# Patient Record
Sex: FEMALE | Race: WHITE | NOT HISPANIC OR LATINO | Employment: FULL TIME | URBAN - METROPOLITAN AREA
[De-identification: names, ages, dates, MRNs, and addresses within clinical notes are randomized per-mention and may not be internally consistent; named-entity substitution may affect disease eponyms.]

---

## 2018-06-13 ENCOUNTER — TRANSCRIBE ORDERS (OUTPATIENT)
Dept: URGENT CARE | Facility: MEDICAL CENTER | Age: 28
End: 2018-06-13

## 2018-06-13 ENCOUNTER — APPOINTMENT (OUTPATIENT)
Dept: LAB | Facility: MEDICAL CENTER | Age: 28
End: 2018-06-13

## 2018-06-13 ENCOUNTER — APPOINTMENT (OUTPATIENT)
Dept: URGENT CARE | Facility: MEDICAL CENTER | Age: 28
End: 2018-06-13

## 2018-06-13 DIAGNOSIS — Z02.1 PRE-EMPLOYMENT EXAMINATION: ICD-10-CM

## 2018-06-13 DIAGNOSIS — Z02.1 PRE-EMPLOYMENT EXAMINATION: Primary | ICD-10-CM

## 2018-06-13 PROCEDURE — 36415 COLL VENOUS BLD VENIPUNCTURE: CPT

## 2018-06-13 PROCEDURE — 86480 TB TEST CELL IMMUN MEASURE: CPT

## 2018-06-15 LAB
ANNOTATION COMMENT IMP: NORMAL
GAMMA INTERFERON BACKGROUND BLD IA-ACNC: 0.03 IU/ML
M TB IFN-G BLD-IMP: NEGATIVE
M TB IFN-G CD4+ BCKGRND COR BLD-ACNC: 0 IU/ML
M TB IFN-G CD4+ T-CELLS BLD-ACNC: 0.03 IU/ML
MITOGEN IGNF BLD-ACNC: 8.99 IU/ML
QUANTIFERON-TB GOLD IN TUBE: NORMAL
SERVICE CMNT-IMP: NORMAL

## 2018-08-06 ENCOUNTER — OFFICE VISIT (OUTPATIENT)
Dept: INTERNAL MEDICINE CLINIC | Facility: CLINIC | Age: 28
End: 2018-08-06
Payer: COMMERCIAL

## 2018-08-06 VITALS
HEART RATE: 64 BPM | OXYGEN SATURATION: 98 % | DIASTOLIC BLOOD PRESSURE: 72 MMHG | WEIGHT: 200.8 LBS | SYSTOLIC BLOOD PRESSURE: 118 MMHG | BODY MASS INDEX: 31.52 KG/M2 | HEIGHT: 67 IN | TEMPERATURE: 97.9 F

## 2018-08-06 DIAGNOSIS — I10 ESSENTIAL HYPERTENSION: ICD-10-CM

## 2018-08-06 DIAGNOSIS — B00.1 HERPES LABIALIS: Primary | ICD-10-CM

## 2018-08-06 DIAGNOSIS — J45.20 MILD INTERMITTENT ASTHMA WITHOUT COMPLICATION: ICD-10-CM

## 2018-08-06 DIAGNOSIS — F41.9 ANXIETY: ICD-10-CM

## 2018-08-06 DIAGNOSIS — G43.109 MIGRAINE WITH AURA AND WITHOUT STATUS MIGRAINOSUS, NOT INTRACTABLE: ICD-10-CM

## 2018-08-06 DIAGNOSIS — G47.09 OTHER INSOMNIA: ICD-10-CM

## 2018-08-06 PROCEDURE — 1036F TOBACCO NON-USER: CPT | Performed by: INTERNAL MEDICINE

## 2018-08-06 PROCEDURE — 3078F DIAST BP <80 MM HG: CPT | Performed by: INTERNAL MEDICINE

## 2018-08-06 PROCEDURE — 99203 OFFICE O/P NEW LOW 30 MIN: CPT | Performed by: INTERNAL MEDICINE

## 2018-08-06 PROCEDURE — 3074F SYST BP LT 130 MM HG: CPT | Performed by: INTERNAL MEDICINE

## 2018-08-06 RX ORDER — CLONAZEPAM 0.5 MG/1
0.5 TABLET ORAL
COMMUNITY
End: 2018-08-06 | Stop reason: SDUPTHER

## 2018-08-06 RX ORDER — ZOLPIDEM TARTRATE 5 MG/1
5 TABLET ORAL AS NEEDED
Refills: 1 | COMMUNITY
Start: 2018-06-09 | End: 2018-08-06 | Stop reason: SDUPTHER

## 2018-08-06 RX ORDER — CLONAZEPAM 0.5 MG/1
0.5 TABLET ORAL
Qty: 30 TABLET | Refills: 0 | Status: SHIPPED | OUTPATIENT
Start: 2018-08-06 | End: 2019-03-07 | Stop reason: SDUPTHER

## 2018-08-06 RX ORDER — ZOLPIDEM TARTRATE 5 MG/1
5 TABLET ORAL AS NEEDED
Qty: 30 TABLET | Refills: 0 | Status: SHIPPED | OUTPATIENT
Start: 2018-08-06 | End: 2019-03-25 | Stop reason: SDUPTHER

## 2018-08-06 RX ORDER — LISINOPRIL 10 MG/1
10 TABLET ORAL DAILY
Refills: 1 | COMMUNITY
Start: 2018-06-08 | End: 2018-08-06 | Stop reason: SDUPTHER

## 2018-08-06 RX ORDER — LISINOPRIL 10 MG/1
10 TABLET ORAL DAILY
Qty: 90 TABLET | Refills: 1 | Status: SHIPPED | OUTPATIENT
Start: 2018-08-06 | End: 2019-03-07 | Stop reason: SDUPTHER

## 2018-08-06 RX ORDER — SERTRALINE HYDROCHLORIDE 100 MG/1
100 TABLET, FILM COATED ORAL DAILY
Refills: 5 | COMMUNITY
Start: 2018-06-11 | End: 2018-08-06 | Stop reason: SDUPTHER

## 2018-08-06 RX ORDER — ACYCLOVIR 400 MG/1
400 TABLET ORAL 2 TIMES DAILY PRN
COMMUNITY
End: 2020-11-05 | Stop reason: ALTCHOICE

## 2018-08-06 RX ORDER — SERTRALINE HYDROCHLORIDE 100 MG/1
100 TABLET, FILM COATED ORAL DAILY
Qty: 90 TABLET | Refills: 1 | Status: SHIPPED | OUTPATIENT
Start: 2018-08-06 | End: 2019-03-07 | Stop reason: SDUPTHER

## 2018-08-06 NOTE — PROGRESS NOTES
Assessment/Plan:    Migraine with aura and without status migrainosus, not intractable  Well controlled  Continue with Excedrin migraine for acute breakthrough  Essential hypertension  Well controlled  Continue with lisinopril 10 mg daily  Anxiety    Stable  Continue with Zoloft 100 mg daily, Klonopin 0 5 mg daily at bedtime -as needed  Other insomnia   Continue with as needed 5 mg Ambien at bedtime  Diagnoses and all orders for this visit:    Herpes labialis    Migraine with aura and without status migrainosus, not intractable    Anxiety  -     sertraline (ZOLOFT) 100 mg tablet; Take 1 tablet (100 mg total) by mouth daily  -     clonazePAM (KlonoPIN) 0 5 mg tablet; Take 1 tablet (0 5 mg total) by mouth daily at bedtime  -     TSH, 3rd generation with T4 reflex; Future    Mild intermittent asthma without complication    Other insomnia  -     zolpidem (AMBIEN) 5 mg tablet; Take 1 tablet (5 mg total) by mouth as needed for sleep    Essential hypertension  -     lisinopril (ZESTRIL) 10 mg tablet; Take 1 tablet (10 mg total) by mouth daily  -     CBC; Future  -     Comprehensive metabolic panel; Future  -     Lipid panel; Future  -     TSH, 3rd generation with T4 reflex; Future    Other orders  -     Discontinue: zolpidem (AMBIEN) 5 mg tablet; Take 5 mg by mouth as needed  -     Discontinue: sertraline (ZOLOFT) 100 mg tablet; Take 100 mg by mouth daily  -     Discontinue: lisinopril (ZESTRIL) 10 mg tablet; Take 10 mg by mouth daily  -     acyclovir (ZOVIRAX) 400 MG tablet; Take 400 mg by mouth 2 (two) times a day  -     Discontinue: clonazePAM (KlonoPIN) 0 5 mg tablet; Take 0 5 mg by mouth daily at bedtime          Subjective:      Patient ID: Albina Holm is a 24 y o  female  51-year-old female is seen today to establish care  She has a past medical history of anxiety / depression, asthma, depression, HTN, and migraine headaches    She recently moved from [place] to which she was compliant with seeing her primary care physician as well as a neurologist for management of migraine headaches and was evaluated once by Psychiatry to confirm appropriate dosing of current medications for management of anxiety depression  She does not take anything prophylactically for migraine headaches, she gets migraine headaches once every 6 months  She has lost 38 lbs over the past year, intentionally, with diet and exercise  Hypertension   This is a chronic problem  The current episode started more than 1 year ago  The problem is unchanged  The problem is controlled  Pertinent negatives include no chest pain, headaches, palpitations or shortness of breath  Risk factors for coronary artery disease include obesity  Past treatments include ACE inhibitors  There are no compliance problems  Migraine    This is a chronic problem  The current episode started more than 1 year ago  The problem occurs seasonly  The problem has been unchanged  The pain quality is similar to prior headaches  The patient is experiencing no pain  Pertinent negatives include no abdominal pain, coughing, dizziness, fever, nausea, numbness, rhinorrhea, sinus pressure, sore throat, vomiting or weakness  She has tried antidepressants for the symptoms  The treatment provided moderate relief  The following portions of the patient's history were reviewed and updated as appropriate: allergies, current medications, past family history, past medical history, past social history, past surgical history and problem list     Review of Systems   Constitutional: Negative for activity change, appetite change, chills, diaphoresis, fatigue and fever  HENT: Negative for congestion, postnasal drip, rhinorrhea, sinus pain, sinus pressure, sneezing and sore throat  Eyes: Negative for visual disturbance  Respiratory: Negative for apnea, cough, choking, chest tightness, shortness of breath and wheezing      Cardiovascular: Negative for chest pain, palpitations and leg swelling  Gastrointestinal: Negative for abdominal distention, abdominal pain, anal bleeding, blood in stool, constipation, diarrhea, nausea and vomiting  Endocrine: Negative for cold intolerance and heat intolerance  Genitourinary: Negative for difficulty urinating, dysuria and hematuria  Musculoskeletal: Negative  Skin: Negative  Neurological: Negative for dizziness, weakness, light-headedness, numbness and headaches  Hematological: Negative for adenopathy  Psychiatric/Behavioral: Negative for agitation, sleep disturbance and suicidal ideas  All other systems reviewed and are negative  Past Medical History:   Diagnosis Date    Anxiety     Asthma     Depression     FHx: migraine headaches     High blood pressure          Current Outpatient Prescriptions:     acyclovir (ZOVIRAX) 400 MG tablet, Take 400 mg by mouth 2 (two) times a day, Disp: , Rfl:     clonazePAM (KlonoPIN) 0 5 mg tablet, Take 1 tablet (0 5 mg total) by mouth daily at bedtime, Disp: 30 tablet, Rfl: 0    lisinopril (ZESTRIL) 10 mg tablet, Take 1 tablet (10 mg total) by mouth daily, Disp: 90 tablet, Rfl: 1    sertraline (ZOLOFT) 100 mg tablet, Take 1 tablet (100 mg total) by mouth daily, Disp: 90 tablet, Rfl: 1    zolpidem (AMBIEN) 5 mg tablet, Take 1 tablet (5 mg total) by mouth as needed for sleep, Disp: 30 tablet, Rfl: 0    Allergies   Allergen Reactions    Clarithromycin Hives    Tetracyclines & Related Hives       Social History   History reviewed  No pertinent surgical history    Family History   Problem Relation Age of Onset    Hypertension Mother     Atrial fibrillation Mother     Stroke Mother     Depression Mother     Hypertension Father     Stroke Maternal Grandmother     Hypertension Maternal Grandmother     Atrial fibrillation Maternal Grandmother     Stroke Maternal Grandfather     Hypertension Maternal Grandfather     Hypertension Paternal Grandfather Objective:  /72 (BP Location: Left arm, Patient Position: Sitting, Cuff Size: Adult)   Pulse 64   Temp 97 9 °F (36 6 °C) (Oral)   Ht 5' 6 73" (1 695 m)   Wt 91 1 kg (200 lb 12 8 oz)   SpO2 98%   BMI 31 70 kg/m²     Recent Results (from the past 1344 hour(s))   Quantiferon TB Gold    Collection Time: 06/13/18  8:27 AM   Result Value Ref Range    QuantiFERON-TB Gold In Tube Specimen incubated at [place], [place]  QuantiFERON TB In Tube-Reflex    Collection Time: 06/13/18  8:27 AM   Result Value Ref Range    QuantiFERON TB Gold Negative Negative    QuantiFERON Criteria Comment     QuantiFERON TB Ag Value 0 03 IU/mL    QuantiFERON Nil Value 0 03 IU/mL    QuantiFERON Mitogen value 8 99 IU/mL    QFT TB Ag minus Nil Value 0 00 IU/mL    QFT Interpretation Comment             Physical Exam   Constitutional: She is oriented to person, place, and time  She appears well-developed and well-nourished  No distress  HENT:   Head: Normocephalic and atraumatic  Eyes: Conjunctivae and EOM are normal  Pupils are equal, round, and reactive to light  Right eye exhibits no discharge  Left eye exhibits no discharge  Neck: Normal range of motion  Neck supple  No JVD present  No thyromegaly present  Cardiovascular: Normal rate, regular rhythm, normal heart sounds and intact distal pulses  Exam reveals no gallop and no friction rub  No murmur heard  Pulmonary/Chest: Effort normal and breath sounds normal  No respiratory distress  She has no wheezes  She has no rales  She exhibits no tenderness  Abdominal: Soft  She exhibits no distension  There is no tenderness  Musculoskeletal: Normal range of motion  She exhibits no edema, tenderness or deformity  Lymphadenopathy:     She has no cervical adenopathy  Neurological: She is alert and oriented to person, place, and time  No cranial nerve deficit  Coordination normal    Skin: Skin is warm and dry  No rash noted  She is not diaphoretic  No erythema   No pallor  Psychiatric: She has a normal mood and affect  Her behavior is normal  Judgment and thought content normal    Nursing note and vitals reviewed

## 2018-08-14 ENCOUNTER — APPOINTMENT (OUTPATIENT)
Dept: LAB | Facility: HOSPITAL | Age: 28
End: 2018-08-14
Attending: INTERNAL MEDICINE
Payer: COMMERCIAL

## 2018-08-14 ENCOUNTER — TRANSCRIBE ORDERS (OUTPATIENT)
Dept: LAB | Facility: HOSPITAL | Age: 28
End: 2018-08-14

## 2018-08-14 DIAGNOSIS — I10 ESSENTIAL HYPERTENSION: ICD-10-CM

## 2018-08-14 DIAGNOSIS — F41.9 ANXIETY: ICD-10-CM

## 2018-08-14 LAB
ALBUMIN SERPL BCP-MCNC: 3.8 G/DL (ref 3.5–5)
ALP SERPL-CCNC: 75 U/L (ref 46–116)
ALT SERPL W P-5'-P-CCNC: 17 U/L (ref 12–78)
ANION GAP SERPL CALCULATED.3IONS-SCNC: 7 MMOL/L (ref 4–13)
AST SERPL W P-5'-P-CCNC: 10 U/L (ref 5–45)
BILIRUB SERPL-MCNC: 0.31 MG/DL (ref 0.2–1)
BUN SERPL-MCNC: 18 MG/DL (ref 5–25)
CALCIUM SERPL-MCNC: 8.7 MG/DL (ref 8.3–10.1)
CHLORIDE SERPL-SCNC: 105 MMOL/L (ref 100–108)
CHOLEST SERPL-MCNC: 182 MG/DL (ref 50–200)
CO2 SERPL-SCNC: 27 MMOL/L (ref 21–32)
CREAT SERPL-MCNC: 0.84 MG/DL (ref 0.6–1.3)
ERYTHROCYTE [DISTWIDTH] IN BLOOD BY AUTOMATED COUNT: 11.9 % (ref 11.6–15.1)
GFR SERPL CREATININE-BSD FRML MDRD: 97 ML/MIN/1.73SQ M
GLUCOSE P FAST SERPL-MCNC: 84 MG/DL (ref 65–99)
HCT VFR BLD AUTO: 38.9 % (ref 34.8–46.1)
HDLC SERPL-MCNC: 47 MG/DL (ref 40–60)
HGB BLD-MCNC: 13 G/DL (ref 11.5–15.4)
LDLC SERPL CALC-MCNC: 124 MG/DL (ref 0–100)
MCH RBC QN AUTO: 30.7 PG (ref 26.8–34.3)
MCHC RBC AUTO-ENTMCNC: 33.4 G/DL (ref 31.4–37.4)
MCV RBC AUTO: 92 FL (ref 82–98)
NONHDLC SERPL-MCNC: 135 MG/DL
PLATELET # BLD AUTO: 232 THOUSANDS/UL (ref 149–390)
PMV BLD AUTO: 10.4 FL (ref 8.9–12.7)
POTASSIUM SERPL-SCNC: 4 MMOL/L (ref 3.5–5.3)
PROT SERPL-MCNC: 7.2 G/DL (ref 6.4–8.2)
RBC # BLD AUTO: 4.23 MILLION/UL (ref 3.81–5.12)
SODIUM SERPL-SCNC: 139 MMOL/L (ref 136–145)
TRIGL SERPL-MCNC: 54 MG/DL
TSH SERPL DL<=0.05 MIU/L-ACNC: 2.39 UIU/ML (ref 0.36–3.74)
WBC # BLD AUTO: 5.84 THOUSAND/UL (ref 4.31–10.16)

## 2018-08-14 PROCEDURE — 84443 ASSAY THYROID STIM HORMONE: CPT

## 2018-08-14 PROCEDURE — 80053 COMPREHEN METABOLIC PANEL: CPT

## 2018-08-14 PROCEDURE — 36415 COLL VENOUS BLD VENIPUNCTURE: CPT

## 2018-08-14 PROCEDURE — 80061 LIPID PANEL: CPT

## 2018-08-14 PROCEDURE — 85027 COMPLETE CBC AUTOMATED: CPT

## 2018-12-14 DIAGNOSIS — B37.9 YEAST INFECTION: Primary | ICD-10-CM

## 2018-12-14 RX ORDER — FLUCONAZOLE 150 MG/1
150 TABLET ORAL ONCE
Qty: 1 TABLET | Refills: 0 | Status: SHIPPED | OUTPATIENT
Start: 2018-12-14 | End: 2018-12-14

## 2019-02-27 ENCOUNTER — OFFICE VISIT (OUTPATIENT)
Dept: INTERNAL MEDICINE CLINIC | Age: 29
End: 2019-02-27
Payer: COMMERCIAL

## 2019-02-27 VITALS
DIASTOLIC BLOOD PRESSURE: 68 MMHG | WEIGHT: 206.2 LBS | TEMPERATURE: 98.8 F | HEIGHT: 67 IN | HEART RATE: 75 BPM | SYSTOLIC BLOOD PRESSURE: 110 MMHG | OXYGEN SATURATION: 98 % | BODY MASS INDEX: 32.36 KG/M2

## 2019-02-27 DIAGNOSIS — B37.9 YEAST INFECTION: ICD-10-CM

## 2019-02-27 DIAGNOSIS — J40 BRONCHITIS: Primary | ICD-10-CM

## 2019-02-27 PROCEDURE — 99213 OFFICE O/P EST LOW 20 MIN: CPT | Performed by: NURSE PRACTITIONER

## 2019-02-27 PROCEDURE — 3008F BODY MASS INDEX DOCD: CPT | Performed by: NURSE PRACTITIONER

## 2019-02-27 PROCEDURE — 1036F TOBACCO NON-USER: CPT | Performed by: NURSE PRACTITIONER

## 2019-02-27 RX ORDER — AZITHROMYCIN 250 MG/1
TABLET, FILM COATED ORAL
Qty: 6 TABLET | Refills: 0 | Status: SHIPPED | OUTPATIENT
Start: 2019-02-27 | End: 2019-03-03

## 2019-02-27 RX ORDER — PROMETHAZINE HYDROCHLORIDE AND CODEINE PHOSPHATE 6.25; 1 MG/5ML; MG/5ML
5 SYRUP ORAL EVERY 6 HOURS PRN
Qty: 120 ML | Refills: 0 | Status: SHIPPED | OUTPATIENT
Start: 2019-02-27 | End: 2019-02-27 | Stop reason: SDUPTHER

## 2019-02-27 RX ORDER — FLUCONAZOLE 150 MG/1
150 TABLET ORAL ONCE
Qty: 2 TABLET | Refills: 0 | Status: SHIPPED | OUTPATIENT
Start: 2019-02-27 | End: 2019-02-27

## 2019-02-27 RX ORDER — PROMETHAZINE HYDROCHLORIDE AND CODEINE PHOSPHATE 6.25; 1 MG/5ML; MG/5ML
5 SYRUP ORAL EVERY 6 HOURS PRN
Qty: 120 ML | Refills: 0 | Status: SHIPPED | OUTPATIENT
Start: 2019-02-27 | End: 2019-03-25 | Stop reason: ALTCHOICE

## 2019-02-27 RX ORDER — AZITHROMYCIN 250 MG/1
TABLET, FILM COATED ORAL
Qty: 6 TABLET | Refills: 0 | Status: SHIPPED | OUTPATIENT
Start: 2019-02-27 | End: 2019-02-27 | Stop reason: SDUPTHER

## 2019-02-27 NOTE — PROGRESS NOTES
Assessment/Plan:    Bronchitis  - pt works in hospital setting  - start z-pack - tolerated well in past  - phenergan with codeine prn  - rest  - stay well hydrated     Diagnoses and all orders for this visit:    Bronchitis  -     Discontinue: azithromycin (ZITHROMAX) 250 mg tablet; Take 2 tablets today then 1 tablet daily x 4 days  -     Discontinue: promethazine-codeine (PHENERGAN WITH CODEINE) 6 25-10 mg/5 mL syrup; Take 5 mL by mouth every 6 (six) hours as needed for cough  -     azithromycin (ZITHROMAX) 250 mg tablet; Take 2 tablets today then 1 tablet daily x 4 days  -     promethazine-codeine (PHENERGAN WITH CODEINE) 6 25-10 mg/5 mL syrup; Take 5 mL by mouth every 6 (six) hours as needed for cough    Yeast infection  -     fluconazole (DIFLUCAN) 150 mg tablet; Take 1 tablet (150 mg total) by mouth once for 1 dose May repeat in 3 days if needed          Subjective:      Patient ID: Pita Urban is a 34 y o  female  URI    This is a new problem  The current episode started today  The problem has been gradually worsening  The maximum temperature recorded prior to her arrival was 101 - 101 9 F  Associated symptoms include congestion, coughing (yellow thick mucous), headaches, a plugged ear sensation, rhinorrhea, sinus pain and a sore throat  Pertinent negatives include no chest pain, ear pain, nausea, rash, vomiting or wheezing  Treatments tried: tyl;enol cold and flu  The treatment provided mild relief  The following portions of the patient's history were reviewed and updated as appropriate: allergies, current medications, past family history, past medical history, past social history, past surgical history and problem list     Review of Systems   Constitutional: Positive for appetite change (decreased), chills and fatigue  Negative for fever  HENT: Positive for congestion, postnasal drip, rhinorrhea, sinus pressure, sinus pain and sore throat  Negative for ear pain      Respiratory: Positive for cough (yellow thick mucous)  Negative for shortness of breath and wheezing  Cardiovascular: Negative for chest pain and palpitations  Gastrointestinal: Negative for nausea and vomiting  Musculoskeletal: Negative for arthralgias and myalgias  Skin: Negative for rash  Neurological: Positive for light-headedness and headaches  Negative for dizziness  Past Medical History:   Diagnosis Date    Anxiety     Asthma     Depression     FHx: migraine headaches     High blood pressure          Current Outpatient Medications:     acyclovir (ZOVIRAX) 400 MG tablet, Take 400 mg by mouth 2 (two) times a day, Disp: , Rfl:     clonazePAM (KlonoPIN) 0 5 mg tablet, Take 1 tablet (0 5 mg total) by mouth daily at bedtime, Disp: 30 tablet, Rfl: 0    lisinopril (ZESTRIL) 10 mg tablet, Take 1 tablet (10 mg total) by mouth daily, Disp: 90 tablet, Rfl: 1    sertraline (ZOLOFT) 100 mg tablet, Take 1 tablet (100 mg total) by mouth daily, Disp: 90 tablet, Rfl: 1    zolpidem (AMBIEN) 5 mg tablet, Take 1 tablet (5 mg total) by mouth as needed for sleep, Disp: 30 tablet, Rfl: 0    azithromycin (ZITHROMAX) 250 mg tablet, Take 2 tablets today then 1 tablet daily x 4 days, Disp: 6 tablet, Rfl: 0    fluconazole (DIFLUCAN) 150 mg tablet, Take 1 tablet (150 mg total) by mouth once for 1 dose May repeat in 3 days if needed, Disp: 2 tablet, Rfl: 0    promethazine-codeine (PHENERGAN WITH CODEINE) 6 25-10 mg/5 mL syrup, Take 5 mL by mouth every 6 (six) hours as needed for cough, Disp: 120 mL, Rfl: 0    Allergies   Allergen Reactions    Clarithromycin Hives    Tetracyclines & Related Hives       Social History   History reviewed  No pertinent surgical history    Family History   Problem Relation Age of Onset    Hypertension Mother     Atrial fibrillation Mother     Stroke Mother     Depression Mother     Hypertension Father     Stroke Maternal Grandmother     Hypertension Maternal Grandmother     Atrial fibrillation Maternal Grandmother     Stroke Maternal Grandfather     Hypertension Maternal Grandfather     Hypertension Paternal Grandfather        Objective:  /68 (BP Location: Left arm, Patient Position: Sitting, Cuff Size: Adult)   Pulse 75   Temp 98 8 °F (37 1 °C) (Tympanic)   Ht 5' 7" (1 702 m)   Wt 93 5 kg (206 lb 3 2 oz)   SpO2 98%   BMI 32 30 kg/m²      Physical Exam   Constitutional: She is oriented to person, place, and time  She appears well-developed and well-nourished  No distress  HENT:   Head: Normocephalic and atraumatic  Right Ear: Hearing, tympanic membrane, external ear and ear canal normal    Left Ear: Hearing, tympanic membrane, external ear and ear canal normal    Nose: Mucosal edema and rhinorrhea present  Right sinus exhibits no maxillary sinus tenderness and no frontal sinus tenderness  Left sinus exhibits no maxillary sinus tenderness and no frontal sinus tenderness  Mouth/Throat: Uvula is midline, oropharynx is clear and moist and mucous membranes are normal    Cardiovascular: Normal rate, regular rhythm and normal heart sounds  No murmur heard  Pulmonary/Chest: Effort normal and breath sounds normal  No respiratory distress  She has no wheezes  Lymphadenopathy:     She has no cervical adenopathy  Neurological: She is alert and oriented to person, place, and time  Skin: Skin is warm and dry  Psychiatric: She has a normal mood and affect  Her behavior is normal  Judgment and thought content normal    Nursing note and vitals reviewed

## 2019-02-27 NOTE — PATIENT INSTRUCTIONS
You have been prescribed an antibiotic today  Take the full course of prescription  Recommend taking with food as may upset your stomach  Also would recommend OTC probiotic or yogurt to help protect the natural fahad of your stomach  Rest   Stay well hydrated  Return for new/worsening symptoms

## 2019-03-06 ENCOUNTER — TELEPHONE (OUTPATIENT)
Dept: INTERNAL MEDICINE CLINIC | Facility: CLINIC | Age: 29
End: 2019-03-06

## 2019-03-06 DIAGNOSIS — I10 ESSENTIAL HYPERTENSION: ICD-10-CM

## 2019-03-06 DIAGNOSIS — F41.9 ANXIETY: ICD-10-CM

## 2019-03-07 RX ORDER — LISINOPRIL 10 MG/1
10 TABLET ORAL DAILY
Qty: 30 TABLET | Refills: 0 | Status: SHIPPED | OUTPATIENT
Start: 2019-03-07 | End: 2019-03-25 | Stop reason: SDUPTHER

## 2019-03-07 RX ORDER — CLONAZEPAM 0.5 MG/1
0.5 TABLET ORAL
Qty: 10 TABLET | Refills: 0 | Status: SHIPPED | OUTPATIENT
Start: 2019-03-07 | End: 2019-03-25 | Stop reason: SDUPTHER

## 2019-03-07 RX ORDER — SERTRALINE HYDROCHLORIDE 100 MG/1
100 TABLET, FILM COATED ORAL DAILY
Qty: 30 TABLET | Refills: 0 | Status: SHIPPED | OUTPATIENT
Start: 2019-03-07 | End: 2019-03-25 | Stop reason: SDUPTHER

## 2019-03-25 ENCOUNTER — OFFICE VISIT (OUTPATIENT)
Dept: INTERNAL MEDICINE CLINIC | Facility: CLINIC | Age: 29
End: 2019-03-25
Payer: COMMERCIAL

## 2019-03-25 VITALS
BODY MASS INDEX: 33.21 KG/M2 | HEIGHT: 67 IN | TEMPERATURE: 97.8 F | OXYGEN SATURATION: 99 % | DIASTOLIC BLOOD PRESSURE: 76 MMHG | WEIGHT: 211.6 LBS | SYSTOLIC BLOOD PRESSURE: 112 MMHG | HEART RATE: 79 BPM

## 2019-03-25 DIAGNOSIS — F41.9 ANXIETY: ICD-10-CM

## 2019-03-25 DIAGNOSIS — I10 ESSENTIAL HYPERTENSION: Primary | ICD-10-CM

## 2019-03-25 DIAGNOSIS — F41.8 TEST ANXIETY: ICD-10-CM

## 2019-03-25 DIAGNOSIS — G47.09 OTHER INSOMNIA: ICD-10-CM

## 2019-03-25 PROCEDURE — 3074F SYST BP LT 130 MM HG: CPT | Performed by: NURSE PRACTITIONER

## 2019-03-25 PROCEDURE — 3078F DIAST BP <80 MM HG: CPT | Performed by: NURSE PRACTITIONER

## 2019-03-25 PROCEDURE — 3008F BODY MASS INDEX DOCD: CPT | Performed by: NURSE PRACTITIONER

## 2019-03-25 PROCEDURE — 99214 OFFICE O/P EST MOD 30 MIN: CPT | Performed by: NURSE PRACTITIONER

## 2019-03-25 PROCEDURE — 1036F TOBACCO NON-USER: CPT | Performed by: NURSE PRACTITIONER

## 2019-03-25 RX ORDER — ZOLPIDEM TARTRATE 5 MG/1
5 TABLET ORAL AS NEEDED
Qty: 30 TABLET | Refills: 0 | Status: SHIPPED | OUTPATIENT
Start: 2019-03-25 | End: 2019-07-01 | Stop reason: SDUPTHER

## 2019-03-25 RX ORDER — LISINOPRIL 10 MG/1
10 TABLET ORAL DAILY
Qty: 90 TABLET | Refills: 1 | Status: SHIPPED | OUTPATIENT
Start: 2019-03-25 | End: 2019-07-01 | Stop reason: SDUPTHER

## 2019-03-25 RX ORDER — CLONAZEPAM 0.5 MG/1
0.5 TABLET ORAL
Qty: 30 TABLET | Refills: 0 | Status: SHIPPED | OUTPATIENT
Start: 2019-03-25 | End: 2019-03-26

## 2019-03-25 NOTE — PROGRESS NOTES
Assessment/Plan:    Essential hypertension  Stable on current dose of lisinopril 10mg daily  Labs last checked in August 2018, stable  Will continue to monitor  Other insomnia  Patient works hour-varying shifts as an OB/GYN resident  She only takes Ambien 5mg as needed on her off-shifts/time, no more than 3 times per week  Her last fill was in August per the PDMP  It was queried and it was appropriate to fill this medication for the patient at this time to use PRN  Anxiety  Will increase patient's sertraline 100mg to 150mg and monitor effect  Patient takes Klonopin 0 5mg PRN for severe anxiety, uses judiciously  PDMP queried and this is appropriate to fill for patient  Patient advised that she must follow up every 3  Months for controlled substances to continue to be filled or sooner if needed  Test anxiety  Discussed with patient that increasing her sertaline will likely help with test taking anxiety  Diagnoses and all orders for this visit:    Essential hypertension  -     lisinopril (ZESTRIL) 10 mg tablet; Take 1 tablet (10 mg total) by mouth daily    Anxiety  -     sertraline (ZOLOFT) 50 mg tablet; Take 3 tablets (150 mg total) by mouth daily  -     Discontinue: clonazePAM (KlonoPIN) 0 5 mg tablet; Take 1 tablet (0 5 mg total) by mouth daily at bedtime  -     clonazePAM (KlonoPIN) 0 5 mg tablet; Take 1 tablet (0 5 mg total) by mouth daily as needed for anxiety    Other insomnia  -     zolpidem (AMBIEN) 5 mg tablet; Take 1 tablet (5 mg total) by mouth as needed for sleep    Test anxiety          Subjective:      Patient ID: Shane Gore is a 34 y o  female  Patient  presents today for follow up on chronic conditions  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 with a lot of stress, however, she states she is doing relatively good considering the situation  She reports she is at baseline with her anxiety and insomnia  Test anxiety is her biggest concern at the moment   She has seen psychology in the past for this, but they had no real suggestions and she was wondering if there were any additional suggestions we may have  She is requesting an increase in her sertraline  Anxiety   Presents for follow-up visit  Symptoms include excessive worry (stable), insomnia (She reports taking ambien 3 times a week, often due to abnormal sleep patterns related to her work schedule) and nervous/anxious behavior (stable, except with test taking, which she states influences her test scores  She has a 14 hour test coming up that she is worried she may fail again due to her anxiety and not her knowledge  )  Patient reports no chest pain, decreased concentration, depressed mood, dizziness, irritability, muscle tension, nausea, palpitations, shortness of breath or suicidal ideas  Symptoms occur most days  The severity of symptoms is mild  The quality of sleep is good  Nighttime awakenings: none  Compliance with medications is % (She currently takes 100mg Zoloft daily  She reports taking klonopin once a week, last panic attack was 4 days ago)  Hypertension   This is a chronic problem  Episode onset: age 14  The problem is unchanged  The problem is controlled  Associated symptoms include anxiety  Pertinent negatives include no blurred vision, chest pain, headaches, palpitations or shortness of breath  There are no associated agents to hypertension  Risk factors for coronary artery disease include obesity, stress and sedentary lifestyle  Past treatments include ACE inhibitors (Linsinopril 10mg; Pt has an IUD)  The current treatment provides significant improvement  Compliance problems include diet and exercise (She is under significant stress and works  hours a week  She plans on focusing more on diet and exercise once she is done school  )          The following portions of the patient's history were reviewed and updated as appropriate: allergies, current medications, past family history, past medical history, past social history, past surgical history and problem list     Review of Systems   Constitutional: Negative for chills, diaphoresis, fever and irritability  HENT: Negative for trouble swallowing  Eyes: Negative for blurred vision  Respiratory: Negative for shortness of breath  Cardiovascular: Negative for chest pain and palpitations  Gastrointestinal: Negative for abdominal pain, nausea and vomiting  Musculoskeletal: Negative for gait problem  Skin: Negative for rash  Neurological: Negative for dizziness and headaches  Psychiatric/Behavioral: Negative for decreased concentration and suicidal ideas  The patient is nervous/anxious (stable, except with test taking, which she states influences her test scores  She has a 14 hour test coming up that she is worried she may fail again due to her anxiety and not her knowledge  ) and has insomnia (She reports taking ambien 3 times a week, often due to abnormal sleep patterns related to her work schedule)  Objective:      /76 (BP Location: Left arm, Patient Position: Sitting, Cuff Size: Large)   Pulse 79   Temp 97 8 °F (36 6 °C) (Oral)   Ht 5' 7" (1 702 m)   Wt 96 kg (211 lb 9 6 oz)   SpO2 99%   BMI 33 14 kg/m²          Physical Exam   Constitutional: She is oriented to person, place, and time  She appears well-developed and well-nourished  No distress  HENT:   Head: Normocephalic and atraumatic  Mouth/Throat: No oropharyngeal exudate  Eyes: Pupils are equal, round, and reactive to light  No scleral icterus  Neck: Normal range of motion  Neck supple  Cardiovascular: Normal rate and regular rhythm  Pulmonary/Chest: Effort normal and breath sounds normal  No respiratory distress  Abdominal: Soft  Musculoskeletal: Normal range of motion  She exhibits no edema  Lymphadenopathy:     She has no cervical adenopathy  Neurological: She is alert and oriented to person, place, and time     Skin: Skin is warm and dry  Psychiatric: She has a normal mood and affect   Her behavior is normal  Judgment and thought content normal

## 2019-03-26 PROBLEM — J40 BRONCHITIS: Status: RESOLVED | Noted: 2019-02-27 | Resolved: 2019-03-26

## 2019-03-26 PROBLEM — F41.8 TEST ANXIETY: Status: ACTIVE | Noted: 2019-03-26

## 2019-03-26 RX ORDER — CLONAZEPAM 0.5 MG/1
0.5 TABLET ORAL DAILY PRN
Qty: 30 TABLET | Refills: 0
Start: 2019-03-26 | End: 2019-07-01 | Stop reason: SDUPTHER

## 2019-03-26 NOTE — ASSESSMENT & PLAN NOTE
Stable on current dose of lisinopril 10mg daily  Labs last checked in August 2018, stable  Will continue to monitor

## 2019-03-26 NOTE — ASSESSMENT & PLAN NOTE
Will increase patient's sertraline 100mg to 150mg and monitor effect  Patient takes Klonopin 0 5mg PRN for severe anxiety, uses judiciously  PDMP queried and this is appropriate to fill for patient  Patient advised that she must follow up every 3  Months for controlled substances to continue to be filled or sooner if needed

## 2019-03-26 NOTE — ASSESSMENT & PLAN NOTE
Patient works hour-varying shifts as an OB/GYN resident  She only takes Ambien 5mg as needed on her off-shifts/time, no more than 3 times per week  Her last fill was in August per the PDMP  It was queried and it was appropriate to fill this medication for the patient at this time to use PRN

## 2019-07-01 ENCOUNTER — OFFICE VISIT (OUTPATIENT)
Dept: INTERNAL MEDICINE CLINIC | Facility: CLINIC | Age: 29
End: 2019-07-01
Payer: COMMERCIAL

## 2019-07-01 VITALS
TEMPERATURE: 98.3 F | DIASTOLIC BLOOD PRESSURE: 80 MMHG | WEIGHT: 214 LBS | HEART RATE: 78 BPM | HEIGHT: 67 IN | OXYGEN SATURATION: 98 % | BODY MASS INDEX: 33.59 KG/M2 | SYSTOLIC BLOOD PRESSURE: 118 MMHG

## 2019-07-01 DIAGNOSIS — F41.9 ANXIETY: Primary | ICD-10-CM

## 2019-07-01 DIAGNOSIS — Z02.6 ENCOUNTER FOR EXAMINATION FOR INSURANCE PURPOSES: ICD-10-CM

## 2019-07-01 DIAGNOSIS — I10 ESSENTIAL HYPERTENSION: ICD-10-CM

## 2019-07-01 DIAGNOSIS — G47.09 OTHER INSOMNIA: ICD-10-CM

## 2019-07-01 PROCEDURE — 99214 OFFICE O/P EST MOD 30 MIN: CPT | Performed by: NURSE PRACTITIONER

## 2019-07-01 PROCEDURE — 3008F BODY MASS INDEX DOCD: CPT | Performed by: NURSE PRACTITIONER

## 2019-07-01 RX ORDER — SERTRALINE HYDROCHLORIDE 100 MG/1
200 TABLET, FILM COATED ORAL DAILY
Qty: 180 TABLET | Refills: 1 | Status: SHIPPED | OUTPATIENT
Start: 2019-07-01 | End: 2020-01-27 | Stop reason: SDUPTHER

## 2019-07-01 RX ORDER — CLONAZEPAM 0.5 MG/1
0.5 TABLET ORAL DAILY PRN
Qty: 30 TABLET | Refills: 0 | Status: SHIPPED | OUTPATIENT
Start: 2019-07-01 | End: 2019-09-17 | Stop reason: SDUPTHER

## 2019-07-01 RX ORDER — LISINOPRIL 10 MG/1
10 TABLET ORAL DAILY
Qty: 90 TABLET | Refills: 1 | Status: SHIPPED | OUTPATIENT
Start: 2019-07-01 | End: 2020-03-16 | Stop reason: SDUPTHER

## 2019-07-01 RX ORDER — ZOLPIDEM TARTRATE 5 MG/1
5 TABLET ORAL AS NEEDED
Qty: 30 TABLET | Refills: 0 | Status: SHIPPED | OUTPATIENT
Start: 2019-07-01 | End: 2019-09-17 | Stop reason: SDUPTHER

## 2019-07-01 NOTE — PROGRESS NOTES
Assessment/Plan:    Essential hypertension  Blood pressure well controlled on lisinopril  Continue with present dosing    Anxiety  Patient's anxiety well controlled on Zoloft 200 mg  Will continue with this dosing at this time  Continue with Klonopin 0 5 mg daily p r n  The PDMP was queried and no signs of abuse or misuse were noted  Refilled at this time  Patient reminded not to use while working or operating heavy machinery  Patient verbalized understanding    Other insomnia  Patient's intermittent insomnia is well controlled with Ambien  Refill given today  The PDMP was queried and no signs of abuse or misuse were noted  Problem List Items Addressed This Visit        Cardiovascular and Mediastinum    Essential hypertension     Blood pressure well controlled on lisinopril  Continue with present dosing         Relevant Medications    lisinopril (ZESTRIL) 10 mg tablet    Other Relevant Orders    CBC and differential    Comprehensive metabolic panel       Other    Other insomnia     Patient's intermittent insomnia is well controlled with Ambien  Refill given today  The PDMP was queried and no signs of abuse or misuse were noted  Relevant Medications    zolpidem (AMBIEN) 5 mg tablet    Anxiety - Primary     Patient's anxiety well controlled on Zoloft 200 mg  Will continue with this dosing at this time  Continue with Klonopin 0 5 mg daily p r n  The PDMP was queried and no signs of abuse or misuse were noted  Refilled at this time  Patient reminded not to use while working or operating heavy machinery  Patient verbalized understanding         Relevant Medications    sertraline (ZOLOFT) 100 mg tablet    clonazePAM (KlonoPIN) 0 5 mg tablet      Other Visit Diagnoses     Encounter for examination for insurance purposes        Relevant Orders    Hemoglobin A1C    Lipid panel        BMI Counseling: Body mass index is 33 52 kg/m²  Discussed the patient's BMI with her   The BMI is above average  BMI counseling and education was provided to the patient  Nutrition recommendations include reducing portion sizes and 3-5 servings of fruits/vegetables daily  Subjective:      Patient ID: Celina Salcedo is a 34 y o  female  Pt is here for a 3 month follow up today for anxiety and insomnia  Pt currently taking sertraline 150 mg daily and Klonopin 0 5mg PRN  Pt states she self-titrated her sertraline 150 mg to 200 mg about 6 weeks ago  Pt states she feels her anxiety is much more controlled at this dose  Pt denies any side effects from medication after self-titration  Pt uses Klonopin about once weekly or less at baseline  Denies using Klonopin more frequently  She takes Ambien 5mg as needed for insomnia  Pt states she is using Ambien about 3 times per month  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, 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, 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  She is never called in from home  She never takes either the Ambien or Klonipin when she is working or on-call  Pt states she feels her anxiety and insomnia are stable and well controlled at this time and reports no change from her baseline  The following portions of the patient's history were reviewed and updated as appropriate: allergies, current medications, past family history, past medical history, past social history, past surgical history and problem list     Review of Systems   Constitutional: Negative for activity change, appetite change, fatigue and fever  HENT: Negative for congestion, postnasal drip, rhinorrhea, sinus pressure, sinus pain and sore throat  Eyes: Negative for visual disturbance  Respiratory: Negative for cough, chest tightness and shortness of breath  Cardiovascular: Negative for chest pain and palpitations  Gastrointestinal: Negative for abdominal pain, constipation, diarrhea, nausea and vomiting  Genitourinary: Negative for dysuria and urgency  Musculoskeletal: Negative for arthralgias and myalgias  Allergic/Immunologic: Positive for food allergies  Neurological: Negative for dizziness, light-headedness and headaches  Psychiatric/Behavioral: Positive for sleep disturbance  Negative for decreased concentration  The patient is not nervous/anxious  Past Medical History:   Diagnosis Date    Anxiety     Asthma     Depression     FHx: migraine headaches     High blood pressure          Current Outpatient Medications:     acyclovir (ZOVIRAX) 400 MG tablet, Take 400 mg by mouth 2 (two) times a day, Disp: , Rfl:     clonazePAM (KlonoPIN) 0 5 mg tablet, Take 1 tablet (0 5 mg total) by mouth daily as needed for anxiety, Disp: 30 tablet, Rfl: 0    lisinopril (ZESTRIL) 10 mg tablet, Take 1 tablet (10 mg total) by mouth daily, Disp: 90 tablet, Rfl: 1    sertraline (ZOLOFT) 100 mg tablet, Take 2 tablets (200 mg total) by mouth daily, Disp: 180 tablet, Rfl: 1    zolpidem (AMBIEN) 5 mg tablet, Take 1 tablet (5 mg total) by mouth as needed for sleep, Disp: 30 tablet, Rfl: 0    Allergies   Allergen Reactions    Clarithromycin Hives    Tetracyclines & Related Hives       Social History   History reviewed  No pertinent surgical history  Family History   Problem Relation Age of Onset    Hypertension Mother     Atrial fibrillation Mother    [facility] Stroke Mother     Depression Mother     Hypertension Father     Stroke Maternal Grandmother     Hypertension Maternal Grandmother     Atrial fibrillation Maternal Grandmother     Stroke Maternal Grandfather     Hypertension Maternal Grandfather     Hypertension Paternal Grandfather        Objective:  /80 (BP Location: Left arm, Patient Position: Sitting, Cuff Size: Large)   Pulse 78   Temp 98 3 °F (36 8 °C) (Oral)   Ht 5' 7" (1 702 m)   Wt 97 1 kg (214 lb)   SpO2 98%   BMI 33 52 kg/m²        Physical Exam   Constitutional: She is oriented to person, place, and time   She appears well-developed and well-nourished  No distress  HENT:   Head: Normocephalic and atraumatic  Mouth/Throat: No oropharyngeal exudate  Eyes: Pupils are equal, round, and reactive to light  EOM are normal  No scleral icterus  Neck: Normal range of motion  Neck supple  No thyromegaly present  Cardiovascular: Normal rate and regular rhythm  Pulmonary/Chest: Effort normal and breath sounds normal    Abdominal: Soft  Bowel sounds are normal    Musculoskeletal: Normal range of motion  Neurological: She is alert and oriented to person, place, and time  Skin: Skin is warm and dry  Psychiatric: She has a normal mood and affect   Her behavior is normal  Judgment and thought content normal

## 2019-07-01 NOTE — PROGRESS NOTES
Assessment/Plan:       Problem List Items Addressed This Visit        Cardiovascular and Mediastinum    Essential hypertension    Relevant Medications    lisinopril (ZESTRIL) 10 mg tablet       Other    Other insomnia    Relevant Medications    zolpidem (AMBIEN) 5 mg tablet    Anxiety - Primary    Relevant Medications    sertraline (ZOLOFT) 100 mg tablet    clonazePAM (KlonoPIN) 0 5 mg tablet      Other Visit Diagnoses     Encounter for examination for insurance purposes                Subjective:      Patient ID: Sagar Lynne is a 34 y o  female  Patient presents for a follow up on anxiety and insomnia  PDMP queried and Klonipin was last filled for 30 tabs on 4/10, Hager Muñoz was last filled for 30 tabs on 8/6/18  No signs of abuse or misuse were noted  The following portions of the patient's history were reviewed and updated as appropriate: allergies, current medications, past family history, past medical history, past social history, past surgical history and problem list     Review of Systems      Past Medical History:   Diagnosis Date    Anxiety     Asthma     Depression     FHx: migraine headaches     High blood pressure          Current Outpatient Medications:     acyclovir (ZOVIRAX) 400 MG tablet, Take 400 mg by mouth 2 (two) times a day, Disp: , Rfl:     clonazePAM (KlonoPIN) 0 5 mg tablet, Take 1 tablet (0 5 mg total) by mouth daily as needed for anxiety, Disp: 30 tablet, Rfl: 0    lisinopril (ZESTRIL) 10 mg tablet, Take 1 tablet (10 mg total) by mouth daily, Disp: 90 tablet, Rfl: 1    sertraline (ZOLOFT) 100 mg tablet, Take 2 tablets (200 mg total) by mouth daily, Disp: 180 tablet, Rfl: 1    zolpidem (AMBIEN) 5 mg tablet, Take 1 tablet (5 mg total) by mouth as needed for sleep, Disp: 30 tablet, Rfl: 0    Allergies   Allergen Reactions    Clarithromycin Hives    Tetracyclines & Related Hives       Social History   History reviewed  No pertinent surgical history    Family History   Problem Relation Age of Onset    Hypertension Mother     Atrial fibrillation Mother    [facility] Stroke Mother     Depression Mother     Hypertension Father     Stroke Maternal Grandmother     Hypertension Maternal Grandmother     Atrial fibrillation Maternal Grandmother     Stroke Maternal Grandfather     Hypertension Maternal Grandfather     Hypertension Paternal Grandfather        Objective:  /80 (BP Location: Left arm, Patient Position: Sitting, Cuff Size: Large)   Pulse 78   Temp 98 3 °F (36 8 °C) (Oral)   Ht 5' 7" (1 702 m)   Wt 97 1 kg (214 lb)   SpO2 98%   BMI 33 52 kg/m²        Physical Exam

## 2019-07-02 NOTE — ASSESSMENT & PLAN NOTE
Patient's anxiety well controlled on Zoloft 200 mg  Will continue with this dosing at this time  Continue with Klonopin 0 5 mg daily p r n  The PDMP was queried and no signs of abuse or misuse were noted  Refilled at this time  Patient reminded not to use while working or operating heavy machinery    Patient verbalized understanding

## 2019-07-17 NOTE — ASSESSMENT & PLAN NOTE
Blood pressure well controlled on lisinopril    Continue with present dosing S/w pt  Confirmed fax #  Pt states it is going to [facility] psychiatry for [facility] psych eval  Advised will fax at this time

## 2019-08-15 ENCOUNTER — APPOINTMENT (OUTPATIENT)
Dept: LAB | Facility: HOSPITAL | Age: 29
End: 2019-08-15
Payer: COMMERCIAL

## 2019-08-15 DIAGNOSIS — I10 ESSENTIAL HYPERTENSION: ICD-10-CM

## 2019-08-15 DIAGNOSIS — Z02.6 ENCOUNTER FOR EXAMINATION FOR INSURANCE PURPOSES: ICD-10-CM

## 2019-08-15 LAB
ALBUMIN SERPL BCP-MCNC: 4.2 G/DL (ref 3.5–5)
ALP SERPL-CCNC: 96 U/L (ref 46–116)
ALT SERPL W P-5'-P-CCNC: 18 U/L (ref 12–78)
ANION GAP SERPL CALCULATED.3IONS-SCNC: 4 MMOL/L (ref 4–13)
AST SERPL W P-5'-P-CCNC: 9 U/L (ref 5–45)
BASOPHILS # BLD AUTO: 0.06 THOUSANDS/ΜL (ref 0–0.1)
BASOPHILS NFR BLD AUTO: 1 % (ref 0–1)
BILIRUB SERPL-MCNC: 0.39 MG/DL (ref 0.2–1)
BUN SERPL-MCNC: 13 MG/DL (ref 5–25)
CALCIUM SERPL-MCNC: 9 MG/DL (ref 8.3–10.1)
CHLORIDE SERPL-SCNC: 106 MMOL/L (ref 100–108)
CHOLEST SERPL-MCNC: 199 MG/DL (ref 50–200)
CO2 SERPL-SCNC: 29 MMOL/L (ref 21–32)
CREAT SERPL-MCNC: 0.81 MG/DL (ref 0.6–1.3)
EOSINOPHIL # BLD AUTO: 0.17 THOUSAND/ΜL (ref 0–0.61)
EOSINOPHIL NFR BLD AUTO: 3 % (ref 0–6)
ERYTHROCYTE [DISTWIDTH] IN BLOOD BY AUTOMATED COUNT: 11.9 % (ref 11.6–15.1)
EST. AVERAGE GLUCOSE BLD GHB EST-MCNC: 91 MG/DL
GFR SERPL CREATININE-BSD FRML MDRD: 101 ML/MIN/1.73SQ M
GLUCOSE P FAST SERPL-MCNC: 92 MG/DL (ref 65–99)
HBA1C MFR BLD: 4.8 % (ref 4.2–6.3)
HCT VFR BLD AUTO: 40.7 % (ref 34.8–46.1)
HDLC SERPL-MCNC: 60 MG/DL (ref 40–60)
HGB BLD-MCNC: 13.6 G/DL (ref 11.5–15.4)
IMM GRANULOCYTES # BLD AUTO: 0.01 THOUSAND/UL (ref 0–0.2)
IMM GRANULOCYTES NFR BLD AUTO: 0 % (ref 0–2)
LDLC SERPL CALC-MCNC: 127 MG/DL (ref 0–100)
LYMPHOCYTES # BLD AUTO: 2.11 THOUSANDS/ΜL (ref 0.6–4.47)
LYMPHOCYTES NFR BLD AUTO: 34 % (ref 14–44)
MCH RBC QN AUTO: 30.6 PG (ref 26.8–34.3)
MCHC RBC AUTO-ENTMCNC: 33.4 G/DL (ref 31.4–37.4)
MCV RBC AUTO: 92 FL (ref 82–98)
MONOCYTES # BLD AUTO: 0.63 THOUSAND/ΜL (ref 0.17–1.22)
MONOCYTES NFR BLD AUTO: 10 % (ref 4–12)
NEUTROPHILS # BLD AUTO: 3.2 THOUSANDS/ΜL (ref 1.85–7.62)
NEUTS SEG NFR BLD AUTO: 52 % (ref 43–75)
NONHDLC SERPL-MCNC: 139 MG/DL
NRBC BLD AUTO-RTO: 0 /100 WBCS
PLATELET # BLD AUTO: 219 THOUSANDS/UL (ref 149–390)
PMV BLD AUTO: 10.5 FL (ref 8.9–12.7)
POTASSIUM SERPL-SCNC: 4 MMOL/L (ref 3.5–5.3)
PROT SERPL-MCNC: 7.8 G/DL (ref 6.4–8.2)
RBC # BLD AUTO: 4.44 MILLION/UL (ref 3.81–5.12)
SODIUM SERPL-SCNC: 139 MMOL/L (ref 136–145)
TRIGL SERPL-MCNC: 58 MG/DL
WBC # BLD AUTO: 6.18 THOUSAND/UL (ref 4.31–10.16)

## 2019-08-15 PROCEDURE — 85025 COMPLETE CBC W/AUTO DIFF WBC: CPT

## 2019-08-15 PROCEDURE — 83036 HEMOGLOBIN GLYCOSYLATED A1C: CPT

## 2019-08-15 PROCEDURE — 80053 COMPREHEN METABOLIC PANEL: CPT

## 2019-08-15 PROCEDURE — 80061 LIPID PANEL: CPT

## 2019-08-15 PROCEDURE — 36415 COLL VENOUS BLD VENIPUNCTURE: CPT

## 2019-09-13 ENCOUNTER — TELEPHONE (OUTPATIENT)
Dept: INTERNAL MEDICINE CLINIC | Facility: CLINIC | Age: 29
End: 2019-09-13

## 2019-09-17 ENCOUNTER — OFFICE VISIT (OUTPATIENT)
Dept: INTERNAL MEDICINE CLINIC | Facility: CLINIC | Age: 29
End: 2019-09-17
Payer: COMMERCIAL

## 2019-09-17 VITALS
HEART RATE: 69 BPM | OXYGEN SATURATION: 99 % | TEMPERATURE: 98.3 F | DIASTOLIC BLOOD PRESSURE: 80 MMHG | SYSTOLIC BLOOD PRESSURE: 120 MMHG

## 2019-09-17 DIAGNOSIS — F41.9 ANXIETY: Primary | ICD-10-CM

## 2019-09-17 DIAGNOSIS — G43.109 MIGRAINE WITH AURA AND WITHOUT STATUS MIGRAINOSUS, NOT INTRACTABLE: ICD-10-CM

## 2019-09-17 DIAGNOSIS — G47.09 OTHER INSOMNIA: ICD-10-CM

## 2019-09-17 DIAGNOSIS — B00.1 HERPES LABIALIS: ICD-10-CM

## 2019-09-17 DIAGNOSIS — I10 ESSENTIAL HYPERTENSION: ICD-10-CM

## 2019-09-17 DIAGNOSIS — J02.9 SORE THROAT: ICD-10-CM

## 2019-09-17 PROCEDURE — 87880 STREP A ASSAY W/OPTIC: CPT | Performed by: NURSE PRACTITIONER

## 2019-09-17 PROCEDURE — 3079F DIAST BP 80-89 MM HG: CPT | Performed by: NURSE PRACTITIONER

## 2019-09-17 PROCEDURE — 99214 OFFICE O/P EST MOD 30 MIN: CPT | Performed by: NURSE PRACTITIONER

## 2019-09-17 PROCEDURE — 87070 CULTURE OTHR SPECIMN AEROBIC: CPT | Performed by: NURSE PRACTITIONER

## 2019-09-17 PROCEDURE — 3074F SYST BP LT 130 MM HG: CPT | Performed by: NURSE PRACTITIONER

## 2019-09-17 RX ORDER — CLONAZEPAM 0.5 MG/1
0.5 TABLET ORAL DAILY PRN
Qty: 30 TABLET | Refills: 0 | Status: SHIPPED | OUTPATIENT
Start: 2019-09-17 | End: 2019-12-16 | Stop reason: ALTCHOICE

## 2019-09-17 RX ORDER — VALACYCLOVIR HYDROCHLORIDE 500 MG/1
500 TABLET, FILM COATED ORAL 2 TIMES DAILY
Qty: 20 TABLET | Refills: 0 | Status: SHIPPED | OUTPATIENT
Start: 2019-09-17 | End: 2020-03-16 | Stop reason: SDUPTHER

## 2019-09-17 RX ORDER — RIZATRIPTAN BENZOATE 10 MG/1
10 TABLET ORAL ONCE AS NEEDED
Qty: 10 TABLET | Refills: 0 | Status: SHIPPED | OUTPATIENT
Start: 2019-09-17 | End: 2020-05-11 | Stop reason: SDUPTHER

## 2019-09-17 RX ORDER — ACYCLOVIR 400 MG/1
400 TABLET ORAL 2 TIMES DAILY
Status: CANCELLED | OUTPATIENT
Start: 2019-09-17

## 2019-09-17 RX ORDER — ZOLPIDEM TARTRATE 5 MG/1
5 TABLET ORAL AS NEEDED
Qty: 30 TABLET | Refills: 0 | Status: SHIPPED | OUTPATIENT
Start: 2019-09-17 | End: 2020-03-16 | Stop reason: SDUPTHER

## 2019-09-17 NOTE — PROGRESS NOTES
Assessment/Plan:        Problem List Items Addressed This Visit        Digestive    Herpes labialis     Refill Valtrex  Follow up if outbreaks increase in frequency         Relevant Medications    valACYclovir (VALTREX) 500 mg tablet       Cardiovascular and Mediastinum    Migraine with aura and without status migrainosus, not intractable     Will refill Maxalt as abortive medication  Follow up if symptoms worsen/increase in frequency         Relevant Medications    clonazePAM (KlonoPIN) 0 5 mg tablet    rizatriptan (MAXALT) 10 MG tablet    Essential hypertension     BP stable, no changes to medication             Other    Sore throat     Rapid strep in office is negative  Will send culture to lab for further assessment and treat based on results  Likely viral in nature  Supportive measures reviewed  Return if symptoms worsen or fail to improve  Relevant Orders    Throat culture    POCT rapid strepA (Completed)    Other insomnia     Refill ambien  The PDMP was queried and no signs of abuse or misuse were noted  Relevant Medications    zolpidem (AMBIEN) 5 mg tablet    Anxiety - Primary     No change in medication  The PDMP was queried and no signs of abuse or misuse were noted  Relevant Medications    clonazePAM (KlonoPIN) 0 5 mg tablet            Subjective:      Patient ID: Harinder Navarro is a 34 y o  female  Patient presents for a 3 month f/u on hypertension, anxiety, insomnia, and to discuss her cold sores, migraines, and her new complaint of sore throat  Hypertension: She reports that she has been compliant with her current dosing of medication  She denies any side effects including dizziness, headaches, shortness of breath, cough, or leg swelling  She feels well overall and feels that her BP is well-controlled  Anxiety: She is happy with her current dose   She has not been needing the Klonipin too often, has only a few left from her last fill 3 months ago of 30 tablets  Insomnia: She is doing well with the Ambien 5mg  She has a couple tabs of the Ambien left since her last fill 3 months ago of 30 tabs  She feel that it really helps with nights that she can't sleep when she is home and not on-call  Herpes labialis: she has been having intermittent outbreaks of cold sores over the past couple of months, for which she has had a prescription of valtrex in the past, which has been helpful  She is asking for a refill  Migraines: She reports that she has a history of migraines in the past  She was on Maxalt  She gets about 2 migraines a year and she had a migraine a couple of weeks ago and ran out of the medication and is in need of a refill  She reports that 2 days ago, she awoke with a sore throat  She has noted that it has progressed  Her voice is hoarse  She feels like she is swallowing glass  Her glands are painful, she has not noted a fever, she denies a known cough  She has no known contacts with strep  She does feel that she has strep  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  The following portions of the patient's history were reviewed and updated as appropriate: allergies, current medications, past family history, past medical history, past social history, past surgical history and problem list     Review of Systems   Constitutional: Negative for chills, diaphoresis and fever  HENT: Positive for sore throat, trouble swallowing (painful, but not difficult) and voice change  Negative for ear discharge, ear pain, hearing loss, sinus pressure and sinus pain  Respiratory: Negative for cough and shortness of breath  Cardiovascular: Negative for chest pain  Gastrointestinal: Negative for abdominal pain, constipation, diarrhea, nausea and vomiting  Genitourinary: Negative for dysuria  Musculoskeletal: Negative for back pain and gait problem  Skin: Negative for rash and wound     Neurological: Positive for headaches (mild)  Negative for dizziness, syncope and light-headedness  Psychiatric/Behavioral: Positive for sleep disturbance (per HPI)  The patient is nervous/anxious (stable)  Past Medical History:   Diagnosis Date    Anxiety     Asthma     Depression     FHx: migraine headaches     High blood pressure          Current Outpatient Medications:     acyclovir (ZOVIRAX) 400 MG tablet, Take 400 mg by mouth 2 (two) times a day, Disp: , Rfl:     clonazePAM (KlonoPIN) 0 5 mg tablet, Take 1 tablet (0 5 mg total) by mouth daily as needed for anxiety, Disp: 30 tablet, Rfl: 0    lisinopril (ZESTRIL) 10 mg tablet, Take 1 tablet (10 mg total) by mouth daily, Disp: 90 tablet, Rfl: 1    sertraline (ZOLOFT) 100 mg tablet, Take 2 tablets (200 mg total) by mouth daily, Disp: 180 tablet, Rfl: 1    zolpidem (AMBIEN) 5 mg tablet, Take 1 tablet (5 mg total) by mouth as needed for sleep, Disp: 30 tablet, Rfl: 0    rizatriptan (MAXALT) 10 MG tablet, Take 1 tablet (10 mg total) by mouth once as needed for migraine for up to 1 day May repeat in 2 hours if needed, Disp: 10 tablet, Rfl: 0    valACYclovir (VALTREX) 500 mg tablet, Take 1 tablet (500 mg total) by mouth 2 (two) times a day for 10 days, Disp: 20 tablet, Rfl: 0    Allergies   Allergen Reactions    Clarithromycin Hives    Tetracyclines & Related Hives       Social History   History reviewed  No pertinent surgical history    Family History   Problem Relation Age of Onset    Hypertension Mother     Atrial fibrillation Mother    [place] Stroke Mother     Depression Mother     Hypertension Father     Stroke Maternal Grandmother     Hypertension Maternal Grandmother     Atrial fibrillation Maternal Grandmother     Stroke Maternal Grandfather     Hypertension Maternal Grandfather     Hypertension Paternal Grandfather        Objective:  /80 (BP Location: Left arm, Patient Position: Sitting, Cuff Size: Large)   Pulse 69   Temp 98 3 °F (36 8 °C) (Oral)   SpO2 99% Comment: ra    Recent Results (from the past 168 hour(s))   POCT rapid strepA    Collection Time: 09/18/19  2:34 PM   Result Value Ref Range     RAPID STREP A Negative Negative   '     Physical Exam   Constitutional: She is oriented to person, place, and time  She appears well-developed and well-nourished  No distress  HENT:   Head: Normocephalic and atraumatic  Right Ear: External ear normal  Tympanic membrane is not erythematous, not retracted and not bulging  Left Ear: External ear normal  Tympanic membrane is not erythematous, not retracted and not bulging  Nose: Nose normal  Right sinus exhibits no maxillary sinus tenderness and no frontal sinus tenderness  Left sinus exhibits no maxillary sinus tenderness and no frontal sinus tenderness  Mouth/Throat: Uvula is midline and mucous membranes are normal  No oral lesions  Posterior oropharyngeal erythema (mild) present  No oropharyngeal exudate  Tonsils are 1+ on the right  Tonsils are 1+ on the left  No tonsillar exudate  Eyes: Pupils are equal, round, and reactive to light  Conjunctivae are normal  No scleral icterus  Neck: Normal range of motion  Neck supple  No tracheal tenderness (hoarse voice noted during visit) present  No tracheal deviation present  No thyromegaly present  Cardiovascular: Normal rate, regular rhythm and normal heart sounds  Pulmonary/Chest: Effort normal and breath sounds normal  No respiratory distress  She has no wheezes  Abdominal: Soft  Bowel sounds are normal  She exhibits no distension  There is no tenderness  Musculoskeletal: Normal range of motion  She exhibits no edema  Lymphadenopathy:     She has cervical adenopathy  Neurological: She is alert and oriented to person, place, and time  No cranial nerve deficit  Skin: Skin is warm and dry  No erythema  Psychiatric: She has a normal mood and affect   Her behavior is normal  Judgment and thought content normal

## 2019-09-18 PROBLEM — J02.9 SORE THROAT: Status: ACTIVE | Noted: 2019-09-18

## 2019-09-18 LAB — S PYO AG THROAT QL: NEGATIVE

## 2019-09-18 NOTE — ASSESSMENT & PLAN NOTE
Rapid strep in office is negative  Will send culture to lab for further assessment and treat based on results  Likely viral in nature  Supportive measures reviewed  Return if symptoms worsen or fail to improve

## 2019-09-20 LAB — BACTERIA THROAT CULT: NORMAL

## 2019-12-16 ENCOUNTER — OFFICE VISIT (OUTPATIENT)
Dept: INTERNAL MEDICINE CLINIC | Facility: CLINIC | Age: 29
End: 2019-12-16
Payer: COMMERCIAL

## 2019-12-16 VITALS
OXYGEN SATURATION: 98 % | DIASTOLIC BLOOD PRESSURE: 78 MMHG | SYSTOLIC BLOOD PRESSURE: 126 MMHG | TEMPERATURE: 98.2 F | BODY MASS INDEX: 33.78 KG/M2 | WEIGHT: 215.2 LBS | HEART RATE: 72 BPM | HEIGHT: 67 IN

## 2019-12-16 DIAGNOSIS — G47.09 OTHER INSOMNIA: ICD-10-CM

## 2019-12-16 DIAGNOSIS — F41.9 ANXIETY: Primary | ICD-10-CM

## 2019-12-16 DIAGNOSIS — I10 ESSENTIAL HYPERTENSION: ICD-10-CM

## 2019-12-16 PROBLEM — J02.9 SORE THROAT: Status: RESOLVED | Noted: 2019-09-18 | Resolved: 2019-12-16

## 2019-12-16 PROCEDURE — 3074F SYST BP LT 130 MM HG: CPT | Performed by: NURSE PRACTITIONER

## 2019-12-16 PROCEDURE — 3008F BODY MASS INDEX DOCD: CPT | Performed by: NURSE PRACTITIONER

## 2019-12-16 PROCEDURE — 3078F DIAST BP <80 MM HG: CPT | Performed by: NURSE PRACTITIONER

## 2019-12-16 PROCEDURE — 99214 OFFICE O/P EST MOD 30 MIN: CPT | Performed by: NURSE PRACTITIONER

## 2019-12-16 RX ORDER — ALPRAZOLAM 0.5 MG/1
0.5 TABLET ORAL 2 TIMES DAILY PRN
Qty: 30 TABLET | Refills: 0 | Status: SHIPPED | OUTPATIENT
Start: 2019-12-16 | End: 2020-03-16 | Stop reason: SDUPTHER

## 2019-12-16 RX ORDER — CLONAZEPAM 0.5 MG/1
TABLET ORAL
Refills: 0 | COMMUNITY
Start: 2019-09-17 | End: 2019-12-16 | Stop reason: SDUPTHER

## 2019-12-16 RX ORDER — CLONAZEPAM 0.5 MG/1
0.5 TABLET ORAL DAILY PRN
Qty: 30 TABLET | Refills: 0 | Status: CANCELLED | OUTPATIENT
Start: 2019-12-16

## 2019-12-16 NOTE — ASSESSMENT & PLAN NOTE
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

## 2019-12-16 NOTE — ASSESSMENT & PLAN NOTE
Continue Ambien as needed, did not need refill today, reports she has about 10 tabs of the previous 30 she was prescribed previously  Will monitor

## 2019-12-16 NOTE — PROGRESS NOTES
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  Will try Xanax instead of Klonipin due to differing onset/durations of action and monitor effect  Patient aware to not take while on call/on shift  Continue Zoloft 200mg po daily  F/u in 3 months or sooner as needed  The PDMP was queried and no signs of abuse or misuse were noted  Relevant Medications    ALPRAZolam (XANAX) 0 5 mg tablet                 Subjective:      Patient ID: Megha Campos is a 34 y o  female  Patient presents for f/u on anxiety and HTN, as well as insomnia  Anxiety:  Overall, she feels like her anxiety is well-controlled on 200mg of Zoloft daily, however she has noted that she has had 3 panic attacks in the past 6 weeks  She works many hours as an Ob/gyn resident and feels that she might just be tired and that might be playing a role in this  She has 5 days off coming up around the new year, which she feels will be restorative  She does feel that the Klonopin she takes for her panic attacks seems to take awhile to take effect and lasts longer in her system then she feels that she may need  She never takes it while on call/ on shift  , but does feel that it makes her very tired and that the rest of the day after she takes it is spent with her being very tired, rather than being able to recover from her panic attack and be productive  HTN:  She is compliant with taking her medication and feels that her blood pressure is well controlled    She denies shortness of breath, chest pain, headaches, lower extremity edema  Insomnia:  She feels that her sleep has been stable  She has found that she has needed fewer Ambien due to finding that the Klonopin has made her so tired that she falls asleep from that rather than eating the Ambien on days that she is particularly anxious and having panic  She reports that she still has approximately 10 tablets of the 30 she was given with her last fill  Hypertension   Associated symptoms include anxiety  Pertinent negatives include no chest pain, headaches or shortness of breath  Anxiety   Symptoms include nervous/anxious behavior  Patient reports no chest pain, dizziness, nausea or shortness of breath  The following portions of the patient's history were reviewed and updated as appropriate: allergies, current medications, past family history, past medical history, past social history, past surgical history and problem list     Review of Systems   Constitutional: Negative for chills and fever  HENT: Negative for trouble swallowing  Respiratory: Negative for shortness of breath  Cardiovascular: Negative for chest pain  Gastrointestinal: Negative for abdominal pain, diarrhea, nausea and vomiting  Musculoskeletal: Negative for back pain  Skin: Negative for rash  Neurological: Negative for dizziness and headaches  Psychiatric/Behavioral: Positive for sleep disturbance  The patient is nervous/anxious            Past Medical History:   Diagnosis Date    Anxiety     Asthma     Depression     FHx: migraine headaches     High blood pressure          Current Outpatient Medications:     lisinopril (ZESTRIL) 10 mg tablet, Take 1 tablet (10 mg total) by mouth daily, Disp: 90 tablet, Rfl: 1    rizatriptan (MAXALT) 10 MG tablet, Take 1 tablet (10 mg total) by mouth once as needed for migraine for up to 1 day May repeat in 2 hours if needed, Disp: 10 tablet, Rfl: 0    sertraline (ZOLOFT) 100 mg tablet, Take 2 tablets (200 mg total) by mouth daily, Disp: 180 tablet, Rfl: 1    valACYclovir (VALTREX) 500 mg tablet, Take 1 tablet (500 mg total) by mouth 2 (two) times a day for 10 days, Disp: 20 tablet, Rfl: 0    zolpidem (AMBIEN) 5 mg tablet, Take 1 tablet (5 mg total) by mouth as needed for sleep, Disp: 30 tablet, Rfl: 0    acyclovir (ZOVIRAX) 400 MG tablet, Take 400 mg by mouth 2 (two) times a day, Disp: , Rfl:     ALPRAZolam (XANAX) 0 5 mg tablet, Take 1 tablet (0 5 mg total) by mouth 2 (two) times a day as needed for anxiety, Disp: 30 tablet, Rfl: 0    Allergies   Allergen Reactions    Clarithromycin Hives    Tetracyclines & Related Hives       Social History   History reviewed  No pertinent surgical history  Family History   Problem Relation Age of Onset    Hypertension Mother     Atrial fibrillation Mother    [facility] Stroke Mother     Depression Mother     Hypertension Father     Stroke Maternal Grandmother     Hypertension Maternal Grandmother     Atrial fibrillation Maternal Grandmother     Stroke Maternal Grandfather     Hypertension Maternal Grandfather     Hypertension Paternal Grandfather        Objective:  /78 (BP Location: Left arm, Patient Position: Sitting, Cuff Size: Large)   Pulse 72   Temp 98 2 °F (36 8 °C) (Oral)   Ht 5' 7" (1 702 m)   Wt 97 6 kg (215 lb 3 2 oz)   SpO2 98%   BMI 33 71 kg/m²        Physical Exam   Constitutional: She is oriented to person, place, and time  She appears well-developed and well-nourished  No distress  HENT:   Head: Normocephalic and atraumatic  Right Ear: External ear normal    Left Ear: External ear normal    Mouth/Throat: No oropharyngeal exudate  Eyes: Pupils are equal, round, and reactive to light  No scleral icterus  Neck: Normal range of motion  Neck supple  Cardiovascular: Normal rate and regular rhythm  Pulmonary/Chest: Effort normal and breath sounds normal    Abdominal: Soft  Musculoskeletal: Normal range of motion  She exhibits no edema  Lymphadenopathy:     She has no cervical adenopathy  Neurological: She is alert and oriented to person, place, and time  No cranial nerve deficit  Skin: Skin is warm and dry  Psychiatric: She has a normal mood and affect   Her behavior is normal

## 2020-01-27 DIAGNOSIS — F41.9 ANXIETY: ICD-10-CM

## 2020-01-27 RX ORDER — SERTRALINE HYDROCHLORIDE 100 MG/1
200 TABLET, FILM COATED ORAL DAILY
Qty: 180 TABLET | Refills: 1 | Status: SHIPPED | OUTPATIENT
Start: 2020-01-27 | End: 2020-03-16 | Stop reason: SDUPTHER

## 2020-01-27 NOTE — TELEPHONE ENCOUNTER
Needs refill on sertraline 100 mg sent to [facility] in [facility]

## 2020-02-17 ENCOUNTER — OFFICE VISIT (OUTPATIENT)
Dept: INTERNAL MEDICINE CLINIC | Facility: CLINIC | Age: 30
End: 2020-02-17
Payer: COMMERCIAL

## 2020-02-17 VITALS
TEMPERATURE: 98.4 F | HEART RATE: 95 BPM | DIASTOLIC BLOOD PRESSURE: 80 MMHG | SYSTOLIC BLOOD PRESSURE: 118 MMHG | BODY MASS INDEX: 33.71 KG/M2 | OXYGEN SATURATION: 98 % | HEIGHT: 67 IN

## 2020-02-17 DIAGNOSIS — R68.89 FLU-LIKE SYMPTOMS: Primary | ICD-10-CM

## 2020-02-17 PROCEDURE — 3074F SYST BP LT 130 MM HG: CPT | Performed by: NURSE PRACTITIONER

## 2020-02-17 PROCEDURE — 87631 RESP VIRUS 3-5 TARGETS: CPT | Performed by: NURSE PRACTITIONER

## 2020-02-17 PROCEDURE — 3079F DIAST BP 80-89 MM HG: CPT | Performed by: NURSE PRACTITIONER

## 2020-02-17 PROCEDURE — 99213 OFFICE O/P EST LOW 20 MIN: CPT | Performed by: NURSE PRACTITIONER

## 2020-02-17 PROCEDURE — 1036F TOBACCO NON-USER: CPT | Performed by: NURSE PRACTITIONER

## 2020-02-17 RX ORDER — DEXTROMETHORPHAN HYDROBROMIDE AND PROMETHAZINE HYDROCHLORIDE 15; 6.25 MG/5ML; MG/5ML
5 SOLUTION ORAL 4 TIMES DAILY PRN
Qty: 240 ML | Refills: 1 | Status: SHIPPED | OUTPATIENT
Start: 2020-02-17 | End: 2020-11-05 | Stop reason: ALTCHOICE

## 2020-02-17 RX ORDER — DEXTROMETHORPHAN HYDROBROMIDE AND PROMETHAZINE HYDROCHLORIDE 15; 6.25 MG/5ML; MG/5ML
5 SOLUTION ORAL 4 TIMES DAILY PRN
Qty: 240 ML | Refills: 1 | Status: SHIPPED | OUTPATIENT
Start: 2020-02-17 | End: 2020-02-17 | Stop reason: SDUPTHER

## 2020-02-17 NOTE — PATIENT INSTRUCTIONS
As discussed, this illness appears to be viral in nature  Symptomatic treatment is advised at this time  Tylenol and ibuprofen can be helpful in alleviating fevers and body aches  Rest and drinking plenty of fluids was also advised  Educated that these symptoms can be bothersome and last up to 2-4 weeks, at times  If they suddenly worsen or change, or continue to last longer than that period of time, it is important to return for reevaluation  Advised to go to the ER if chest pain or shortness of breath suddenly develops or fevers over 102 degrees that are not lowered with tylenol or motrin start  Return sooner with any new or continued concerns  Advised to start taking Flonase nasal spray to help open nasal passages and sinuses  This can be used up to two sprays in each nostril daily, whether it is one spray in each nostril in the morning and evening, or twice once per day  Once acute symptoms have improved, may continue to use at dosing of one spray in each nostril daily if symptoms appear to have an allergic component  This medication does work best when taken consistently for allergic-type symptoms  Additionally, warm steam inhalations may help to loosen secretions and mucous, as well

## 2020-02-17 NOTE — PROGRESS NOTES
Assessment/Plan:       Problem List Items Addressed This Visit        Other    Flu-like symptoms - Primary     D/t work environment, will check flu swab  Take promethazine DM  Continue Theraflu  Supportive care measures reviewed  No longer a candidate for Tamiflu (outside therapeutic window)  F/u if symptoms worsen or fail to improve  Relevant Medications    Promethazine-DM (PHENERGAN-DM) 6 25-15 mg/5 mL oral syrup    Other Relevant Orders    Influenza A/B and RSV PCR            BMI Counseling: Body mass index is 33 71 kg/m²  The BMI is above normal  Nutrition recommendations include consuming healthier snacks  Subjective:      Patient ID: Holden Canada is a 34 y o  female  Pt presents for an acute visit  Symptoms started 4 days ago with sinus congestion, fever, sore throat, cough, myalgias  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, was told by attending to come in for flu swab today after shift  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, including most she worked with on Saturday  The following portions of the patient's history were reviewed and updated as appropriate: allergies, current medications, past family history, past medical history, past social history, past surgical history and problem list     Review of Systems   Constitutional: Positive for fever  HENT: Positive for congestion, postnasal drip, rhinorrhea, sneezing and sore throat  Negative for ear discharge, ear pain and trouble swallowing (painful)  Respiratory: Positive for cough (intermittently productive)  Negative for shortness of breath  Cardiovascular: Negative for chest pain  Gastrointestinal: Positive for nausea (likely from PND per patient)  Negative for abdominal pain, diarrhea and vomiting  Musculoskeletal: Positive for myalgias  Skin: Negative for rash  Neurological: Positive for headaches  Negative for dizziness     Psychiatric/Behavioral: Positive for sleep disturbance (d/t coughing)  Past Medical History:   Diagnosis Date    Anxiety     Asthma     Depression     FHx: migraine headaches     High blood pressure          Current Outpatient Medications:     acyclovir (ZOVIRAX) 400 MG tablet, Take 400 mg by mouth 2 (two) times a day, Disp: , Rfl:     ALPRAZolam (XANAX) 0 5 mg tablet, Take 1 tablet (0 5 mg total) by mouth 2 (two) times a day as needed for anxiety, Disp: 30 tablet, Rfl: 0    lisinopril (ZESTRIL) 10 mg tablet, Take 1 tablet (10 mg total) by mouth daily, Disp: 90 tablet, Rfl: 1    rizatriptan (MAXALT) 10 MG tablet, Take 1 tablet (10 mg total) by mouth once as needed for migraine for up to 1 day May repeat in 2 hours if needed, Disp: 10 tablet, Rfl: 0    sertraline (ZOLOFT) 100 mg tablet, Take 2 tablets (200 mg total) by mouth daily, Disp: 180 tablet, Rfl: 1    zolpidem (AMBIEN) 5 mg tablet, Take 1 tablet (5 mg total) by mouth as needed for sleep, Disp: 30 tablet, Rfl: 0    Promethazine-DM (PHENERGAN-DM) 6 25-15 mg/5 mL oral syrup, Take 5 mL by mouth 4 (four) times a day as needed for cough, Disp: 240 mL, Rfl: 1    valACYclovir (VALTREX) 500 mg tablet, Take 1 tablet (500 mg total) by mouth 2 (two) times a day for 10 days (Patient not taking: Reported on 2/17/2020), Disp: 20 tablet, Rfl: 0    Allergies   Allergen Reactions    Clarithromycin Hives    Tetracyclines & Related Hives       Social History   History reviewed  No pertinent surgical history    Family History   Problem Relation Age of Onset    Hypertension Mother     Atrial fibrillation Mother    [facility] Stroke Mother     Depression Mother     Hypertension Father     Stroke Maternal Grandmother     Hypertension Maternal Grandmother     Atrial fibrillation Maternal Grandmother     Stroke Maternal Grandfather     Hypertension Maternal Grandfather     Hypertension Paternal Grandfather        Objective:  /80 (BP Location: Left arm, Patient Position: Sitting, Cuff Size: Large)   Pulse 95 Temp 98 4 °F (36 9 °C) (Oral)   Ht 5' 7" (1 702 m)   SpO2 98%   BMI 33 71 kg/m²        Physical Exam   Constitutional: She is oriented to person, place, and time  She appears well-developed and well-nourished  No distress  HENT:   Head: Normocephalic and atraumatic  Right Ear: External ear normal  Tympanic membrane is not bulging  Left Ear: External ear normal  Tympanic membrane is not bulging  Nose: Mucosal edema present  Right sinus exhibits maxillary sinus tenderness  Right sinus exhibits no frontal sinus tenderness  Left sinus exhibits maxillary sinus tenderness  Left sinus exhibits no frontal sinus tenderness  Mouth/Throat: Uvula is midline  Posterior oropharyngeal erythema (mild) present  No oropharyngeal exudate  Eyes: Pupils are equal, round, and reactive to light  No scleral icterus  Neck: Normal range of motion  Neck supple  Cardiovascular: Normal rate and regular rhythm  Pulmonary/Chest: Effort normal and breath sounds normal    Frequent coughing during visit   Abdominal: Soft  Bowel sounds are normal    Musculoskeletal: Normal range of motion  She exhibits no edema  Lymphadenopathy:     She has cervical adenopathy  Neurological: She is alert and oriented to person, place, and time  Skin: Skin is warm  Psychiatric: She has a normal mood and affect   Her behavior is normal

## 2020-02-18 LAB
FLUAV RNA NPH QL NAA+PROBE: NORMAL
FLUBV RNA NPH QL NAA+PROBE: NORMAL
RSV RNA NPH QL NAA+PROBE: NORMAL

## 2020-02-18 NOTE — ASSESSMENT & PLAN NOTE
D/t work environment, will check flu swab  Take promethazine DM  Continue Theraflu  Supportive care measures reviewed  No longer a candidate for Tamiflu (outside therapeutic window)  F/u if symptoms worsen or fail to improve

## 2020-03-16 DIAGNOSIS — F41.9 ANXIETY: ICD-10-CM

## 2020-03-16 DIAGNOSIS — B00.1 HERPES LABIALIS: ICD-10-CM

## 2020-03-16 DIAGNOSIS — I10 ESSENTIAL HYPERTENSION: ICD-10-CM

## 2020-03-16 DIAGNOSIS — G47.09 OTHER INSOMNIA: ICD-10-CM

## 2020-03-16 RX ORDER — LISINOPRIL 10 MG/1
10 TABLET ORAL DAILY
Qty: 60 TABLET | Refills: 0 | Status: SHIPPED | OUTPATIENT
Start: 2020-03-16 | End: 2020-05-11 | Stop reason: SDUPTHER

## 2020-03-16 RX ORDER — ALPRAZOLAM 0.5 MG/1
0.5 TABLET ORAL 2 TIMES DAILY PRN
Qty: 30 TABLET | Refills: 0 | Status: SHIPPED | OUTPATIENT
Start: 2020-03-16 | End: 2020-05-11 | Stop reason: SDUPTHER

## 2020-03-16 RX ORDER — ZOLPIDEM TARTRATE 5 MG/1
5 TABLET ORAL AS NEEDED
Qty: 30 TABLET | Refills: 0 | Status: SHIPPED | OUTPATIENT
Start: 2020-03-16 | End: 2020-05-11 | Stop reason: SDUPTHER

## 2020-03-16 RX ORDER — SERTRALINE HYDROCHLORIDE 100 MG/1
200 TABLET, FILM COATED ORAL DAILY
Qty: 120 TABLET | Refills: 0 | Status: SHIPPED | OUTPATIENT
Start: 2020-03-16 | End: 2020-05-11 | Stop reason: SDUPTHER

## 2020-03-16 RX ORDER — VALACYCLOVIR HYDROCHLORIDE 500 MG/1
500 TABLET, FILM COATED ORAL 2 TIMES DAILY
Qty: 20 TABLET | Refills: 0 | Status: SHIPPED | OUTPATIENT
Start: 2020-03-16 | End: 2020-11-05 | Stop reason: SDUPTHER

## 2020-03-16 NOTE — TELEPHONE ENCOUNTER
Last O/V: 12/16/2019  Was seen 02/17/2020 for acute symptoms     Per [facility] ok to fill for 2 months,

## 2020-04-12 ENCOUNTER — NURSE TRIAGE (OUTPATIENT)
Dept: OTHER | Facility: OTHER | Age: 30
End: 2020-04-12

## 2020-04-12 DIAGNOSIS — Z20.822 CLOSE EXPOSURE TO COVID-19 VIRUS: Primary | ICD-10-CM

## 2020-04-13 DIAGNOSIS — Z20.822 CLOSE EXPOSURE TO COVID-19 VIRUS: ICD-10-CM

## 2020-04-13 PROCEDURE — 87635 SARS-COV-2 COVID-19 AMP PRB: CPT

## 2020-04-17 LAB
INPATIENT: NORMAL
SARS-COV-2 RNA SPEC QL NAA+PROBE: NOT DETECTED

## 2020-05-01 ENCOUNTER — HOSPITAL ENCOUNTER (EMERGENCY)
Facility: HOSPITAL | Age: 30
Discharge: HOME | End: 2020-05-01
Attending: EMERGENCY MEDICINE | Admitting: EMERGENCY MEDICINE
Payer: COMMERCIAL

## 2020-05-01 ENCOUNTER — APPOINTMENT (EMERGENCY)
Dept: RADIOLOGY | Facility: HOSPITAL | Age: 30
End: 2020-05-01
Payer: COMMERCIAL

## 2020-05-01 VITALS
BODY MASS INDEX: 35.08 KG/M2 | OXYGEN SATURATION: 98 % | TEMPERATURE: 98.6 F | SYSTOLIC BLOOD PRESSURE: 157 MMHG | DIASTOLIC BLOOD PRESSURE: 98 MMHG | RESPIRATION RATE: 20 BRPM | WEIGHT: 223.99 LBS | HEART RATE: 107 BPM

## 2020-05-01 DIAGNOSIS — M79.605 LEFT LEG PAIN: Primary | ICD-10-CM

## 2020-05-01 PROCEDURE — 73630 X-RAY EXAM OF FOOT: CPT

## 2020-05-01 PROCEDURE — 99284 EMERGENCY DEPT VISIT MOD MDM: CPT | Performed by: EMERGENCY MEDICINE

## 2020-05-01 PROCEDURE — 73590 X-RAY EXAM OF LOWER LEG: CPT

## 2020-05-01 PROCEDURE — 99283 EMERGENCY DEPT VISIT LOW MDM: CPT

## 2020-05-01 RX ORDER — IBUPROFEN 400 MG/1
400 TABLET ORAL ONCE
Status: COMPLETED | OUTPATIENT
Start: 2020-05-01 | End: 2020-05-01

## 2020-05-01 RX ORDER — LIDOCAINE 50 MG/G
1 PATCH TOPICAL ONCE
Status: DISCONTINUED | OUTPATIENT
Start: 2020-05-01 | End: 2020-05-01 | Stop reason: HOSPADM

## 2020-05-01 RX ORDER — ACETAMINOPHEN 325 MG/1
975 TABLET ORAL ONCE
Status: COMPLETED | OUTPATIENT
Start: 2020-05-01 | End: 2020-05-01

## 2020-05-01 RX ADMIN — ACETAMINOPHEN 975 MG: 325 TABLET ORAL at 09:59

## 2020-05-01 RX ADMIN — LIDOCAINE 1 PATCH: 50 PATCH TOPICAL at 11:05

## 2020-05-01 RX ADMIN — IBUPROFEN 400 MG: 400 TABLET ORAL at 09:59

## 2020-05-11 ENCOUNTER — TELEMEDICINE (OUTPATIENT)
Dept: INTERNAL MEDICINE CLINIC | Facility: CLINIC | Age: 30
End: 2020-05-11
Payer: COMMERCIAL

## 2020-05-11 VITALS — BODY MASS INDEX: 33.27 KG/M2 | HEIGHT: 67 IN | WEIGHT: 212 LBS

## 2020-05-11 DIAGNOSIS — I10 ESSENTIAL HYPERTENSION: ICD-10-CM

## 2020-05-11 DIAGNOSIS — G47.09 OTHER INSOMNIA: ICD-10-CM

## 2020-05-11 DIAGNOSIS — F41.9 ANXIETY: ICD-10-CM

## 2020-05-11 DIAGNOSIS — G43.109 MIGRAINE WITH AURA AND WITHOUT STATUS MIGRAINOSUS, NOT INTRACTABLE: ICD-10-CM

## 2020-05-11 PROCEDURE — 99214 OFFICE O/P EST MOD 30 MIN: CPT | Performed by: NURSE PRACTITIONER

## 2020-05-11 RX ORDER — ZOLPIDEM TARTRATE 5 MG/1
5 TABLET ORAL AS NEEDED
Qty: 30 TABLET | Refills: 0 | Status: SHIPPED | OUTPATIENT
Start: 2020-05-11 | End: 2021-03-17 | Stop reason: SDUPTHER

## 2020-05-11 RX ORDER — ALPRAZOLAM 0.5 MG/1
0.5 TABLET ORAL 2 TIMES DAILY PRN
Qty: 30 TABLET | Refills: 0 | Status: SHIPPED | OUTPATIENT
Start: 2020-05-11 | End: 2020-09-08 | Stop reason: SDUPTHER

## 2020-05-11 RX ORDER — SERTRALINE HYDROCHLORIDE 100 MG/1
200 TABLET, FILM COATED ORAL DAILY
Qty: 120 TABLET | Refills: 0 | Status: SHIPPED | OUTPATIENT
Start: 2020-05-11 | End: 2020-09-08 | Stop reason: SDUPTHER

## 2020-05-11 RX ORDER — RIZATRIPTAN BENZOATE 10 MG/1
10 TABLET ORAL ONCE AS NEEDED
Qty: 10 TABLET | Refills: 0 | Status: SHIPPED | OUTPATIENT
Start: 2020-05-11 | End: 2020-11-05 | Stop reason: SDUPTHER

## 2020-05-11 RX ORDER — LISINOPRIL 10 MG/1
10 TABLET ORAL DAILY
Qty: 60 TABLET | Refills: 0 | Status: SHIPPED | OUTPATIENT
Start: 2020-05-11 | End: 2020-09-08 | Stop reason: SDUPTHER

## 2020-09-08 DIAGNOSIS — I10 ESSENTIAL HYPERTENSION: ICD-10-CM

## 2020-09-08 DIAGNOSIS — F41.9 ANXIETY: ICD-10-CM

## 2020-09-08 RX ORDER — LISINOPRIL 10 MG/1
10 TABLET ORAL DAILY
Qty: 30 TABLET | Refills: 1 | Status: SHIPPED | OUTPATIENT
Start: 2020-09-08 | End: 2020-11-05 | Stop reason: SDUPTHER

## 2020-09-08 RX ORDER — SERTRALINE HYDROCHLORIDE 100 MG/1
200 TABLET, FILM COATED ORAL DAILY
Qty: 60 TABLET | Refills: 1 | Status: SHIPPED | OUTPATIENT
Start: 2020-09-08 | End: 2020-11-05 | Stop reason: SDUPTHER

## 2020-09-08 RX ORDER — ALPRAZOLAM 0.5 MG/1
0.5 TABLET ORAL 2 TIMES DAILY PRN
Qty: 30 TABLET | Refills: 0 | Status: SHIPPED | OUTPATIENT
Start: 2020-09-08 | End: 2020-11-05 | Stop reason: SDUPTHER

## 2020-09-08 NOTE — TELEPHONE ENCOUNTER
Patient is completely out of her medications and she is requesting refills of the sertraline, lisinopril and Xanax be sent to [facility] in [facility]  She made an appt for 10/6/20 with Loren Galan as that is the only provider she wishes to see

## 2020-11-05 ENCOUNTER — OFFICE VISIT (OUTPATIENT)
Dept: INTERNAL MEDICINE CLINIC | Facility: CLINIC | Age: 30
End: 2020-11-05
Payer: COMMERCIAL

## 2020-11-05 VITALS
OXYGEN SATURATION: 98 % | WEIGHT: 215 LBS | SYSTOLIC BLOOD PRESSURE: 128 MMHG | HEART RATE: 72 BPM | DIASTOLIC BLOOD PRESSURE: 80 MMHG | HEIGHT: 67 IN | BODY MASS INDEX: 33.74 KG/M2 | TEMPERATURE: 98.1 F

## 2020-11-05 DIAGNOSIS — G43.109 MIGRAINE WITH AURA AND WITHOUT STATUS MIGRAINOSUS, NOT INTRACTABLE: Primary | ICD-10-CM

## 2020-11-05 DIAGNOSIS — Z11.3 SCREENING FOR STDS (SEXUALLY TRANSMITTED DISEASES): ICD-10-CM

## 2020-11-05 DIAGNOSIS — E66.09 CLASS 1 OBESITY DUE TO EXCESS CALORIES WITHOUT SERIOUS COMORBIDITY WITH BODY MASS INDEX (BMI) OF 33.0 TO 33.9 IN ADULT: ICD-10-CM

## 2020-11-05 DIAGNOSIS — B00.1 HERPES LABIALIS: ICD-10-CM

## 2020-11-05 DIAGNOSIS — G47.09 OTHER INSOMNIA: ICD-10-CM

## 2020-11-05 DIAGNOSIS — I10 ESSENTIAL HYPERTENSION: ICD-10-CM

## 2020-11-05 DIAGNOSIS — G44.209 TENSION HEADACHE: ICD-10-CM

## 2020-11-05 DIAGNOSIS — F41.9 ANXIETY: ICD-10-CM

## 2020-11-05 PROCEDURE — 99214 OFFICE O/P EST MOD 30 MIN: CPT | Performed by: NURSE PRACTITIONER

## 2020-11-05 RX ORDER — RIZATRIPTAN BENZOATE 10 MG/1
10 TABLET ORAL ONCE AS NEEDED
Qty: 10 TABLET | Refills: 0 | Status: SHIPPED | OUTPATIENT
Start: 2020-11-05 | End: 2021-03-17 | Stop reason: SDUPTHER

## 2020-11-05 RX ORDER — LISINOPRIL 10 MG/1
10 TABLET ORAL DAILY
Qty: 90 TABLET | Refills: 1 | Status: SHIPPED | OUTPATIENT
Start: 2020-11-05 | End: 2021-05-27 | Stop reason: SDUPTHER

## 2020-11-05 RX ORDER — METHOCARBAMOL 500 MG/1
500 TABLET, FILM COATED ORAL
Qty: 30 TABLET | Refills: 0 | Status: SHIPPED | OUTPATIENT
Start: 2020-11-05 | End: 2021-03-17 | Stop reason: SDUPTHER

## 2020-11-05 RX ORDER — SERTRALINE HYDROCHLORIDE 100 MG/1
200 TABLET, FILM COATED ORAL DAILY
Qty: 180 TABLET | Refills: 1 | Status: SHIPPED | OUTPATIENT
Start: 2020-11-05 | End: 2021-05-27 | Stop reason: SDUPTHER

## 2020-11-05 RX ORDER — VALACYCLOVIR HYDROCHLORIDE 500 MG/1
500 TABLET, FILM COATED ORAL DAILY
Qty: 90 TABLET | Refills: 1 | Status: SHIPPED | OUTPATIENT
Start: 2020-11-05 | End: 2021-05-27 | Stop reason: SDUPTHER

## 2020-11-05 RX ORDER — ALPRAZOLAM 0.5 MG/1
0.5 TABLET ORAL 2 TIMES DAILY PRN
Qty: 30 TABLET | Refills: 0 | Status: SHIPPED | OUTPATIENT
Start: 2020-11-05 | End: 2021-03-17 | Stop reason: SDUPTHER

## 2020-11-06 PROBLEM — R68.89 FLU-LIKE SYMPTOMS: Status: RESOLVED | Noted: 2020-02-17 | Resolved: 2020-11-06

## 2020-11-06 PROBLEM — G44.209 TENSION HEADACHE: Status: ACTIVE | Noted: 2020-11-06

## 2020-12-19 ENCOUNTER — IMMUNIZATIONS (OUTPATIENT)
Dept: FAMILY MEDICINE CLINIC | Facility: HOSPITAL | Age: 30
End: 2020-12-19
Payer: COMMERCIAL

## 2020-12-19 DIAGNOSIS — Z23 ENCOUNTER FOR IMMUNIZATION: ICD-10-CM

## 2020-12-19 PROCEDURE — 91300 SARS-COV-2 / COVID-19 MRNA VACCINE (PFIZER-BIONTECH) 30 MCG: CPT

## 2020-12-19 PROCEDURE — 0001A SARS-COV-2 / COVID-19 MRNA VACCINE (PFIZER-BIONTECH) 30 MCG: CPT

## 2021-01-09 ENCOUNTER — IMMUNIZATIONS (OUTPATIENT)
Dept: FAMILY MEDICINE CLINIC | Facility: HOSPITAL | Age: 31
End: 2021-01-09

## 2021-01-09 DIAGNOSIS — Z23 ENCOUNTER FOR IMMUNIZATION: ICD-10-CM

## 2021-01-09 PROCEDURE — 91300 SARS-COV-2 / COVID-19 MRNA VACCINE (PFIZER-BIONTECH) 30 MCG: CPT

## 2021-01-09 PROCEDURE — 0002A SARS-COV-2 / COVID-19 MRNA VACCINE (PFIZER-BIONTECH) 30 MCG: CPT

## 2021-02-19 DIAGNOSIS — F41.9 ANXIETY: ICD-10-CM

## 2021-02-19 RX ORDER — SERTRALINE HYDROCHLORIDE 100 MG/1
TABLET, FILM COATED ORAL
Qty: 180 TABLET | Refills: 0 | OUTPATIENT
Start: 2021-02-19

## 2021-03-09 ENCOUNTER — TELEPHONE (OUTPATIENT)
Dept: NEUROLOGY | Facility: CLINIC | Age: 31
End: 2021-03-09

## 2021-03-09 NOTE — TELEPHONE ENCOUNTER
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

## 2021-03-10 NOTE — TELEPHONE ENCOUNTER
Called and left a detailed message regarding rescheduling the appointment for Dr Suma Chang with Dr Ursula Rolle on 4/7/21 or 4/8/21 in the [facility] office

## 2021-03-17 DIAGNOSIS — G47.09 OTHER INSOMNIA: ICD-10-CM

## 2021-03-17 DIAGNOSIS — F41.9 ANXIETY: ICD-10-CM

## 2021-03-17 DIAGNOSIS — I10 ESSENTIAL HYPERTENSION: ICD-10-CM

## 2021-03-17 DIAGNOSIS — G43.109 MIGRAINE WITH AURA AND WITHOUT STATUS MIGRAINOSUS, NOT INTRACTABLE: ICD-10-CM

## 2021-03-17 DIAGNOSIS — G44.209 TENSION HEADACHE: ICD-10-CM

## 2021-03-17 DIAGNOSIS — B00.1 HERPES LABIALIS: ICD-10-CM

## 2021-03-17 RX ORDER — ZOLPIDEM TARTRATE 5 MG/1
5 TABLET ORAL AS NEEDED
Qty: 30 TABLET | Refills: 0 | Status: SHIPPED | OUTPATIENT
Start: 2021-03-17 | End: 2021-08-18 | Stop reason: SDUPTHER

## 2021-03-17 RX ORDER — RIZATRIPTAN BENZOATE 10 MG/1
10 TABLET ORAL ONCE AS NEEDED
Qty: 10 TABLET | Refills: 0 | Status: SHIPPED | OUTPATIENT
Start: 2021-03-17 | End: 2021-04-07

## 2021-03-17 RX ORDER — ALPRAZOLAM 0.5 MG/1
0.5 TABLET ORAL 2 TIMES DAILY PRN
Qty: 30 TABLET | Refills: 0 | Status: SHIPPED | OUTPATIENT
Start: 2021-03-17 | End: 2021-05-27 | Stop reason: SDUPTHER

## 2021-03-17 RX ORDER — VALACYCLOVIR HYDROCHLORIDE 500 MG/1
TABLET, FILM COATED ORAL
Qty: 90 TABLET | Refills: 0 | OUTPATIENT
Start: 2021-03-17

## 2021-03-17 RX ORDER — ZOLPIDEM TARTRATE 5 MG/1
TABLET ORAL
Qty: 30 TABLET | Refills: 0 | OUTPATIENT
Start: 2021-03-17

## 2021-03-17 RX ORDER — METHOCARBAMOL 500 MG/1
500 TABLET, FILM COATED ORAL
Qty: 30 TABLET | Refills: 0 | Status: SHIPPED | OUTPATIENT
Start: 2021-03-17 | End: 2021-12-05 | Stop reason: SDUPTHER

## 2021-03-17 RX ORDER — LISINOPRIL 10 MG/1
TABLET ORAL
Qty: 90 TABLET | Refills: 0 | OUTPATIENT
Start: 2021-03-17

## 2021-03-22 DIAGNOSIS — B37.9 YEAST INFECTION: Primary | ICD-10-CM

## 2021-03-22 RX ORDER — FLUCONAZOLE 150 MG/1
150 TABLET ORAL ONCE
Qty: 1 TABLET | Refills: 0 | Status: SHIPPED | OUTPATIENT
Start: 2021-03-22 | End: 2021-03-23

## 2021-03-23 ENCOUNTER — OFFICE VISIT (OUTPATIENT)
Dept: INTERNAL MEDICINE CLINIC | Facility: CLINIC | Age: 31
End: 2021-03-23
Payer: COMMERCIAL

## 2021-03-23 ENCOUNTER — TELEPHONE (OUTPATIENT)
Dept: INTERNAL MEDICINE CLINIC | Facility: CLINIC | Age: 31
End: 2021-03-23

## 2021-03-23 VITALS
HEIGHT: 67 IN | TEMPERATURE: 99.6 F | RESPIRATION RATE: 18 BRPM | HEART RATE: 78 BPM | WEIGHT: 220.8 LBS | BODY MASS INDEX: 34.65 KG/M2 | DIASTOLIC BLOOD PRESSURE: 70 MMHG | OXYGEN SATURATION: 98 % | SYSTOLIC BLOOD PRESSURE: 112 MMHG

## 2021-03-23 DIAGNOSIS — Z11.3 SCREEN FOR STD (SEXUALLY TRANSMITTED DISEASE): ICD-10-CM

## 2021-03-23 DIAGNOSIS — N89.8 VAGINAL DISCHARGE: ICD-10-CM

## 2021-03-23 DIAGNOSIS — Z00.00 ROUTINE HISTORY AND PHYSICAL EXAMINATION OF ADULT: ICD-10-CM

## 2021-03-23 DIAGNOSIS — Z12.4 SCREENING FOR CERVICAL CANCER: Primary | ICD-10-CM

## 2021-03-23 PROCEDURE — G0145 SCR C/V CYTO,THINLAYER,RESCR: HCPCS | Performed by: NURSE PRACTITIONER

## 2021-03-23 PROCEDURE — S0612 ANNUAL GYNECOLOGICAL EXAMINA: HCPCS | Performed by: NURSE PRACTITIONER

## 2021-03-23 PROCEDURE — 87591 N.GONORRHOEAE DNA AMP PROB: CPT | Performed by: NURSE PRACTITIONER

## 2021-03-23 PROCEDURE — 81513 NFCT DS BV RNA VAG FLU ALG: CPT | Performed by: NURSE PRACTITIONER

## 2021-03-23 PROCEDURE — 87491 CHLMYD TRACH DNA AMP PROBE: CPT | Performed by: NURSE PRACTITIONER

## 2021-03-23 PROCEDURE — 99212 OFFICE O/P EST SF 10 MIN: CPT | Performed by: NURSE PRACTITIONER

## 2021-03-23 NOTE — TELEPHONE ENCOUNTER
Patient did not want to schedule follow up at this moment  She will be on 2 vacations come June and July

## 2021-03-25 LAB
C TRACH DNA SPEC QL NAA+PROBE: NEGATIVE
CANDIDA RRNA VAG QL PROBE: NEGATIVE
G VAGINALIS RRNA GENITAL QL PROBE: POSITIVE
N GONORRHOEA DNA SPEC QL NAA+PROBE: NEGATIVE
T VAGINALIS RRNA GENITAL QL PROBE: NEGATIVE

## 2021-03-27 LAB
LAB AP GYN PRIMARY INTERPRETATION: NORMAL
Lab: NORMAL

## 2021-04-06 NOTE — PROGRESS NOTES
Patient ID: Kathryn Nuno is a 31 y o  female  Assessment/Plan:   Patient Instructions   Migraine headaches:  Karen Xiong presents for an initial consultation with regard to migraine headaches  She has significant migraines at least 4 times per month which are quite refractory to appropriate treatment  She has already tried and been failed by at least to appropriate preventative agents including oxcarbazepine and sertraline  Her neurologic exam is excellent in the office today with no evidence of papilledema on funduscopy  -for migraine prevention we will begin with a calcitonin gene related peptide inhibitor, Emgality  She will begin with 2 subcutaneous injections for month 1  And then 1 injection per month thereafter     - for prevention we will discontinue the rise a triptan as that has not been effective and initiate Zomig Triptan  She can take 1 tablet at the onset of migraine  She can take that with either 3 tablets of ibuprofen or 2 tablets of naproxen, Tylenol, and Reglan 10 mg  If she is in a position where she can sleep she could add Benadryl 25 mg     - she should work to remain well hydrated drinking at least 36-48 oz of non caffeinated beverages per day in addition to any caffeine   - 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  No problem-specific Assessment & Plan notes found for this encounter         Diagnoses and all orders for this visit:    Migraine with aura and without status migrainosus, not intractable  -     Galcanezumab-gnlm 120 MG/ML SOAJ; Inject 240 mg under the skin every 30 (thirty) days for 30 days, THEN 120 mg every 30 (thirty) days  -     ZOLMitriptan (ZOMIG) 5 MG tablet; 1/2-1 tab at onset of migraine, can repeat in 2 hours, max 10mg/24 hours  -     metoclopramide (REGLAN) 10 mg tablet; 1 tab as needed at onset of migraine, can repeat in 8 hours, can combine with triptan/NSAID    Tension headache  -     Galcanezumab-gnlm 120 MG/ML SOAJ; Inject 240 mg under the skin every 30 (thirty) days for 30 days, THEN 120 mg every 30 (thirty) days  -     ZOLMitriptan (ZOMIG) 5 MG tablet; 1/2-1 tab at onset of migraine, can repeat in 2 hours, max 10mg/24 hours  -     metoclopramide (REGLAN) 10 mg tablet; 1 tab as needed at onset of migraine, can repeat in 8 hours, can combine with triptan/NSAID           Subjective:    ALIS Urban is a 31 y o  female who presents for evaluation of headache  Temporal Pattern of Headaches: They started having HA's 16 years ago at 17 yo    Is there any particular time of day that is worse: no  Do they awaken from sleep?: yes - occasionally maybe once a month   Is there any seasonal pattern?: no  Any relationship to menstrual cycle: no  'Clustering' of HA's over time? no  Have you found any triggers? stress, change in sleep pattern    Description of Headaches:   Do you have a specific aura? photopsias, if so, do you ever get aura without headache? no, or headache without aura? yes   Are there any other prodromal sx?: only the aura   How long do they last? 30 minutes    What is the typical location of pain: right-sided unilateral, left-sided unilateral    What is the character of pain:pulsating, sharp and stabbing     How severe is the pain for a typical headache? 7, tension headaches 4  What is the maximum severity of pain?  10    Accompanying symptoms:nausea, decreased social functioning, photophobia, phonophobia,     Rapidity of onset: sudden  Typical duration of individual headache: 30 minutes with aura   Frequency of headaches: migraines once a month, tension headaches 2-3 times a week  Are most headaches similar in presentation? yes  Exacerbating factors if any: none    Do you have multiple headache types? 3 Types- 1  Classic migraine, visual aura flashing lights for 30 minutes then headache starts and lasts 4 hours and then has 2 hours of nausea s/p headache  2  Migraine pain on left side but without aura and no nausea  3  Milder headache, tight band sensation, associated with stress and lack of sleep         Therapeutics:  CURRENT Abortive meds? Rizatriptan/benadryl/tylenol/robaxin taken together effectiveness unclear   still has to sleep it off  How often do you use them? Once a month    CURRENT Preventive meds? none    CURRENT Non-Medical/Alternative treatments for HA: massage      PRIOR PREVENTATIVE medications: Oxcaebazepine (no help), Zoloft (no help)    PRIOR ABORTIVE mediations: acetaminophen, NSAIDs (ibuprofen), aspirin/acetaminophen/caffeine    Additional Relevant History:  History of severe head/neck trauma? History of 3 concussions in high school and one 6 years ago and misaligned cervical spine  History of head/neck surgery? no  Do you have any family history of headache? no known family members with significant headaches  Family history of aneurysms? no        I personally reviewed the information above and confirmed with the patient  At this point she appears to have migraine with aura  We reviewed several different options in the office today with regard to treatment including calcitonin gene related peptide inhibitors, topiramate, and a tricyclic antidepressant  Ultimately a CGrp inhibitor appears to have the best combination  Of effectiveness and low side effect profile  She also confirms that she has an IUD in place and will not be getting pregnant         Objective:    /90 (BP Location: Left arm, Patient Position: Sitting, Cuff Size: Large)   Pulse 90   Ht 5' 7" (1 702 m)   BMI 34 58 kg/m²       Physical Exam    Neurological Exam      At the time of my examination she was awake, alert, and in no distress  She is an excellent historian  There is no dysarthria or aphasia  Cranial nerves 2-12 were symmetrically intact bilaterally  Motor testing reveals 5/5 strength throughout the bilateral upper lower extremities  There is no drift  Finger-to-nose with eyes closed was intact indicating preserved proprioceptive function  With eyes open there is no dysmetria or ataxia  Sensation was intact to temperature and vibration throughout the bilateral upper lower extremities  Deep tendon reflexes were 3+ and symmetric in the bilateral upper lower extremities  Her gait was stable  Funduscopic exam revealed no papilledema bilaterally      ROS:    Review of Systems

## 2021-04-07 ENCOUNTER — CONSULT (OUTPATIENT)
Dept: NEUROLOGY | Facility: CLINIC | Age: 31
End: 2021-04-07
Payer: COMMERCIAL

## 2021-04-07 VITALS
HEART RATE: 90 BPM | SYSTOLIC BLOOD PRESSURE: 120 MMHG | BODY MASS INDEX: 34.58 KG/M2 | HEIGHT: 67 IN | DIASTOLIC BLOOD PRESSURE: 90 MMHG

## 2021-04-07 DIAGNOSIS — G44.209 TENSION HEADACHE: ICD-10-CM

## 2021-04-07 DIAGNOSIS — G43.109 MIGRAINE WITH AURA AND WITHOUT STATUS MIGRAINOSUS, NOT INTRACTABLE: Primary | ICD-10-CM

## 2021-04-07 PROCEDURE — 99244 OFF/OP CNSLTJ NEW/EST MOD 40: CPT | Performed by: PSYCHIATRY & NEUROLOGY

## 2021-04-07 RX ORDER — ZOLMITRIPTAN 5 MG/1
TABLET, FILM COATED ORAL
Qty: 12 TABLET | Refills: 3 | Status: SHIPPED | OUTPATIENT
Start: 2021-04-07

## 2021-04-07 RX ORDER — METOCLOPRAMIDE 10 MG/1
TABLET ORAL
Qty: 20 TABLET | Refills: 3 | Status: SHIPPED | OUTPATIENT
Start: 2021-04-07

## 2021-04-07 NOTE — PATIENT INSTRUCTIONS
Migraine headaches:  Karen Xiong presents for an initial consultation with regard to migraine headaches  She has significant migraines at least 4 times per month which are quite refractory to appropriate treatment  She has already tried and been failed by at least to appropriate preventative agents including oxcarbazepine and sertraline  Her neurologic exam is excellent in the office today with no evidence of papilledema on funduscopy  -for migraine prevention we will begin with a calcitonin gene related peptide inhibitor, Emgality  She will begin with 2 subcutaneous injections for month 1  And then 1 injection per month thereafter     - for prevention we will discontinue the rise a triptan as that has not been effective and initiate Zomig Triptan  She can take 1 tablet at the onset of migraine  She can take that with either 3 tablets of ibuprofen or 2 tablets of naproxen, Tylenol, and Reglan 10 mg  If she is in a position where she can sleep she could add Benadryl 25 mg     - she should work to remain well hydrated drinking at least 36-48 oz of non caffeinated beverages per day in addition to any caffeine   - 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

## 2021-04-08 ENCOUNTER — TELEPHONE (OUTPATIENT)
Dept: NEUROLOGY | Facility: CLINIC | Age: 31
End: 2021-04-08

## 2021-04-08 NOTE — TELEPHONE ENCOUNTER
Received fax from [facility] that Emgality requires a Tamia Espinoza [id number]  Working on [place]

## 2021-05-18 DIAGNOSIS — B00.1 HERPES LABIALIS: ICD-10-CM

## 2021-05-18 DIAGNOSIS — Z00.00 ROUTINE ADULT HEALTH MAINTENANCE: Primary | ICD-10-CM

## 2021-05-18 RX ORDER — VALACYCLOVIR HYDROCHLORIDE 500 MG/1
TABLET, FILM COATED ORAL
Qty: 90 TABLET | Refills: 0 | OUTPATIENT
Start: 2021-05-18

## 2021-05-24 DIAGNOSIS — B00.1 HERPES LABIALIS: ICD-10-CM

## 2021-05-24 DIAGNOSIS — I10 ESSENTIAL HYPERTENSION: ICD-10-CM

## 2021-05-24 DIAGNOSIS — F41.9 ANXIETY: ICD-10-CM

## 2021-05-24 RX ORDER — ALPRAZOLAM 0.5 MG/1
TABLET ORAL
Qty: 30 TABLET | Refills: 0 | OUTPATIENT
Start: 2021-05-24

## 2021-05-25 RX ORDER — SERTRALINE HYDROCHLORIDE 100 MG/1
TABLET, FILM COATED ORAL
Qty: 180 TABLET | Refills: 0 | OUTPATIENT
Start: 2021-05-25

## 2021-05-25 RX ORDER — VALACYCLOVIR HYDROCHLORIDE 500 MG/1
TABLET, FILM COATED ORAL
Qty: 90 TABLET | Refills: 0 | OUTPATIENT
Start: 2021-05-25

## 2021-05-25 RX ORDER — LISINOPRIL 10 MG/1
TABLET ORAL
Qty: 90 TABLET | Refills: 0 | OUTPATIENT
Start: 2021-05-25

## 2021-05-26 DIAGNOSIS — F41.9 ANXIETY: ICD-10-CM

## 2021-05-26 DIAGNOSIS — I10 ESSENTIAL HYPERTENSION: ICD-10-CM

## 2021-05-27 DIAGNOSIS — I10 ESSENTIAL HYPERTENSION: ICD-10-CM

## 2021-05-27 DIAGNOSIS — B00.1 HERPES LABIALIS: ICD-10-CM

## 2021-05-27 DIAGNOSIS — F41.9 ANXIETY: ICD-10-CM

## 2021-05-27 RX ORDER — LISINOPRIL 10 MG/1
10 TABLET ORAL DAILY
Qty: 90 TABLET | Refills: 1 | Status: SHIPPED | OUTPATIENT
Start: 2021-05-27 | End: 2021-08-18 | Stop reason: SDUPTHER

## 2021-05-27 RX ORDER — ALPRAZOLAM 0.5 MG/1
0.5 TABLET ORAL 2 TIMES DAILY PRN
Qty: 30 TABLET | Refills: 0 | Status: SHIPPED | OUTPATIENT
Start: 2021-05-27 | End: 2021-08-18 | Stop reason: SDUPTHER

## 2021-05-27 RX ORDER — VALACYCLOVIR HYDROCHLORIDE 500 MG/1
500 TABLET, FILM COATED ORAL DAILY
Qty: 90 TABLET | Refills: 1 | Status: SHIPPED | OUTPATIENT
Start: 2021-05-27 | End: 2021-08-18 | Stop reason: SDUPTHER

## 2021-05-27 RX ORDER — SERTRALINE HYDROCHLORIDE 100 MG/1
200 TABLET, FILM COATED ORAL DAILY
Qty: 180 TABLET | Refills: 1 | Status: SHIPPED | OUTPATIENT
Start: 2021-05-27 | End: 2021-08-18 | Stop reason: SDUPTHER

## 2021-06-01 RX ORDER — SERTRALINE HYDROCHLORIDE 100 MG/1
TABLET, FILM COATED ORAL
Qty: 180 TABLET | Refills: 0 | OUTPATIENT
Start: 2021-06-01

## 2021-06-01 RX ORDER — LISINOPRIL 10 MG/1
TABLET ORAL
Qty: 90 TABLET | Refills: 0 | OUTPATIENT
Start: 2021-06-01

## 2021-06-01 RX ORDER — ALPRAZOLAM 0.5 MG/1
TABLET ORAL
Qty: 30 TABLET | Refills: 0 | OUTPATIENT
Start: 2021-06-01

## 2021-07-12 NOTE — PROGRESS NOTES
Assessment         Jannie Gil was seen today for follow-up, gynecologic exam, health maintenance and flu vaccine  Diagnoses and all orders for this visit:    Screening for cervical cancer  -     Cervical or vaginal cytopath, thin prep; Future    Routine history and physical examination of adult    Screen for STD (sexually transmitted disease)  -     Chlamydia/GC amplified DNA by PCR; Future  -     VAGINOSIS DNA PROBE (AFFIRM)    Vaginal discharge  -     Molecular Vaginal Panel; Future         Plan      All questions answered  Await pap smear results  Chlamydia specimen  Discussed healthy lifestyle modifications  Follow up as needed  GC specimen  Thin prep Pap smear  Edison Drew is a 31 y o  female who presents for annual exam  Periods are absent, has had since October 2017, lasting 0 days  Dysmenorrhea:none  Cyclic symptoms include none  No intermenstrual bleeding, spotting, or discharge  The patient reports that there is not domestic violence in her life  Current contraception: IUD  History of abnormal Pap smear: yes - 2016  Normal in 2017  Family history of uterine or ovarian cancer: no  Regular self breast exam: yes  History of abnormal mammogram: no  Family history of breast cancer: no  History of abnormal lipids: no  Menstrual History:  OB History    No obstetric history on file  Menarche age: 8  No LMP recorded  The following portions of the patient's history were reviewed and updated as appropriate: allergies, current medications, past family history, past medical history, past social history, past surgical history and problem list     Review of Systems   Constitutional: Negative for chills, diaphoresis, fatigue and fever  HENT: Negative for trouble swallowing  Respiratory: Negative for shortness of breath and wheezing  Cardiovascular: Negative for chest pain, palpitations and leg swelling     Gastrointestinal: Negative for abdominal pain, femoral vein constipation, diarrhea, nausea and vomiting  Genitourinary: Positive for vaginal pain (currently has candida)  Negative for difficulty urinating, dysuria, genital sores, menstrual problem, pelvic pain, vaginal bleeding and vaginal discharge  Musculoskeletal: Negative for arthralgias, back pain, gait problem and myalgias  Skin: Negative for rash and wound  Neurological: Positive for headaches (has neuro appt 4/7)  Negative for dizziness, syncope, light-headedness and numbness  Psychiatric/Behavioral: Positive for sleep disturbance (over the past week )  The patient is nervous/anxious  Objective      /70 (BP Location: Left arm, Patient Position: Sitting, Cuff Size: Large)   Pulse 78   Temp 99 6 °F (37 6 °C) (Tympanic)   Resp 18   Ht 5' 7" (1 702 m)   Wt 100 kg (220 lb 12 8 oz)   SpO2 98%   BMI 34 58 kg/m²     General:   alert and oriented, in no acute distress, appears stated age and cooperative   Heart: regular rate and rhythm, S1, S2 normal, no murmur, click, rub or gallop   Lungs: clear to auscultation bilaterally   Abdomen: soft, non-tender, without masses or organomegaly   Vulva: normal   Vagina: normal mucosa   Cervix: no cervical motion tenderness and IUD string noted   Uterus: normal size, non-tender, normal shape and consistency   Adnexa: no mass, fullness, tenderness      Chaperone present

## 2021-07-13 ENCOUNTER — TELEPHONE (OUTPATIENT)
Dept: PSYCHIATRY | Facility: CLINIC | Age: 31
End: 2021-07-13

## 2021-07-23 ENCOUNTER — OFFICE VISIT (OUTPATIENT)
Dept: NEUROLOGY | Facility: CLINIC | Age: 31
End: 2021-07-23
Payer: COMMERCIAL

## 2021-07-23 VITALS
BODY MASS INDEX: 36.41 KG/M2 | SYSTOLIC BLOOD PRESSURE: 115 MMHG | HEART RATE: 72 BPM | DIASTOLIC BLOOD PRESSURE: 56 MMHG | WEIGHT: 232 LBS | HEIGHT: 67 IN

## 2021-07-23 DIAGNOSIS — G43.109 MIGRAINE WITH AURA AND WITHOUT STATUS MIGRAINOSUS, NOT INTRACTABLE: Primary | ICD-10-CM

## 2021-07-23 DIAGNOSIS — G44.209 TENSION HEADACHE: ICD-10-CM

## 2021-07-23 PROCEDURE — 99213 OFFICE O/P EST LOW 20 MIN: CPT | Performed by: PHYSICIAN ASSISTANT

## 2021-07-23 NOTE — PROGRESS NOTES
Patient ID: Nadege Rader is a 31 y o  female  Assessment/Plan:       Diagnoses and all orders for this visit:    Migraine with aura and without status migrainosus, not intractable  -     Galcanezumab-gnlm 120 MG/ML SOAJ; 1 subcu injection q30 days    Tension headache        She reports zero migraine headaches since starting Emgality in April  She was instructed to continue Emgality since it helps and she denies side effects  She could try to inject in the abdominal subcutaneous area (a couple inches from umbilicus) as this can render less pain (versus thigh), however she states injection-site pain is not outweighing the benefits of the medication thankfully  Continue Zomig p r n  migraine/ migraine with aura  She also has Reglan on hand for nausea  Ibuprofen p r n  low-grade headache, but recommend no more than 3 doses per week to prevent medication overuse headache  She was instructed to contact me any time with any new or worsening symptoms or if a migraine headache is not controlled  The patient should not hesitate to call me prior to her follow up with any questions or concerns  Subjective:    HPI      Nadege Rader is an extremely pleasant 46-year-old female who is here for neurological follow-up for migraine headaches with aura  She also has frequent tension-type headaches  She was prescribed Emgality injectable since last seen  She has had about 4 consecutive injections which started in April, an she denies side effects except a painful injection site for about 20 minutes  She reports 0 headaches since starting the injection  She states that her migraine headaches are very sporadic and she still has been unable to identify any trigger or pattern for them  She has been on the Mirena for several years now and does not get a period    She does not think that the migraines are hormonal      She is grateful that she has not had any migraine with aura because it would significantly impact her daily functioning  For example if she had an aura during surgery she would have to scurb out  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 several years ago when she was in college  She has been on lisinopril and her blood pressure has been maintained  Today is 115/56  She had been experiencing migraine auras years prior to and after the hemorrhage and she does not note a difference in the migraine aura since the hemorrhage, nor a correlation  The aura is consistent with flashing lights which last anywhere from 30-60 minutes  When she had the hemorrhage it lasted longer and it was consistent with black spots only  Her vision did improve and is back to baseline since the hemorrhage  When she gets a migraine headache with aura she usually takes Zomig, but prefers not to take it if not needed because it can make her feel tense and there are other side effects  For low-grade tension-type headaches which she gets sporadically from dehydration, stress, sleep deprivation, she uses ibuprofen  She does not take any more than 3-4 doses per week  The character of her migraines have not changed since last seen  She denies any focal or lateralizing deficits with the migraines other than the ocular symptoms/aura  The following portions of the patient's history were reviewed and updated as appropriate:   She  has a past medical history of Anxiety, Asthma, Depression, FHx: migraine headaches, and High blood pressure  She   Patient Active Problem List    Diagnosis Date Noted    Tension headache 11/06/2020    Test anxiety 03/26/2019    Herpes labialis 08/06/2018    Migraine with aura and without status migrainosus, not intractable 08/06/2018    Anxiety 08/06/2018    Mild intermittent asthma without complication 17/11/5883    Other insomnia 08/06/2018    Essential hypertension 08/06/2018     She  has no past surgical history on file    Her family history includes Atrial fibrillation in her maternal grandmother and mother; Depression in her mother; Hypertension in her father, maternal grandfather, maternal grandmother, mother, and paternal grandfather; Stroke in her maternal grandfather, maternal grandmother, and mother  She  reports that she has never smoked  She has never used smokeless tobacco  She reports current alcohol use  She reports that she does not use drugs  Current Outpatient Medications   Medication Sig Dispense Refill    ALPRAZolam (XANAX) 0 5 mg tablet Take 1 tablet (0 5 mg total) by mouth 2 (two) times a day as needed for anxiety 30 tablet 0    Galcanezumab-gnlm 120 MG/ML SOAJ 1 subcu injection q30 days 1 mL 11    lisinopril (ZESTRIL) 10 mg tablet Take 1 tablet (10 mg total) by mouth daily 90 tablet 1    methocarbamol (ROBAXIN) 500 mg tablet Take 1 tablet (500 mg total) by mouth daily at bedtime as needed for muscle spasms 30 tablet 0    metoclopramide (REGLAN) 10 mg tablet 1 tab as needed at onset of migraine, can repeat in 8 hours, can combine with triptan/NSAID 20 tablet 3    sertraline (ZOLOFT) 100 mg tablet Take 2 tablets (200 mg total) by mouth daily 180 tablet 1    valACYclovir (VALTREX) 500 mg tablet Take 1 tablet (500 mg total) by mouth daily 90 tablet 1    ZOLMitriptan (ZOMIG) 5 MG tablet 1/2-1 tab at onset of migraine, can repeat in 2 hours, max 10mg/24 hours 12 tablet 3    zolpidem (AMBIEN) 5 mg tablet Take 1 tablet (5 mg total) by mouth as needed for sleep 30 tablet 0     No current facility-administered medications for this visit  She is allergic to clarithromycin and tetracyclines & related            Objective:    Blood pressure 115/56, pulse 72, height 5' 7" (1 702 m), weight 105 kg (232 lb)  Body mass index is 36 34 kg/m²  Physical Exam    Neurological Exam  Vital signs reviewed  Well developed, well nourished  Head: Normocephalic, atraumatic  Speech is fluent and articulate    Mood and affect are extremely pleasant  CN 2-12: intact and symmetric, including EOMs which are normal b/l and PERRL  Fundi b/l are normal to crude ophthalmological examination  MSK: 5/5 t/o  ROM normal x all 4 extr  Sensation: Romberg negative  Reflexes: 2+ and symmetric in all 4 extr  Coordination: Nml x4 extr  Gait: Steady normal gait, tandem gait is steady  ROS:    Review of Systems   Constitutional: Negative  Negative for appetite change and fever  HENT: Negative  Negative for hearing loss, tinnitus, trouble swallowing and voice change  Eyes: Negative  Negative for photophobia and pain  Respiratory: Negative  Negative for shortness of breath  Cardiovascular: Negative  Negative for palpitations  Gastrointestinal: Negative  Negative for nausea and vomiting  Endocrine: Negative  Negative for cold intolerance  Genitourinary: Negative  Negative for dysuria, frequency and urgency  Musculoskeletal: Negative  Negative for myalgias and neck pain  Skin: Negative  Negative for rash  Neurological: Positive for headaches  Negative for dizziness, tremors, seizures, syncope, facial asymmetry, speech difficulty, weakness, light-headedness and numbness  Hematological: Negative  Does not bruise/bleed easily  Psychiatric/Behavioral: Positive for confusion  Negative for hallucinations and sleep disturbance  The following portions of the patient's history were reviewed and updated as appropriate: allergies, current medications/ medication history, past family history, past medical history, past social history, past surgical history and problem list     Review of systems was reviewed and otherwise unremarkable from a neurological perspective

## 2021-08-18 DIAGNOSIS — F41.9 ANXIETY: ICD-10-CM

## 2021-08-18 DIAGNOSIS — I10 ESSENTIAL HYPERTENSION: ICD-10-CM

## 2021-08-18 DIAGNOSIS — B00.1 HERPES LABIALIS: ICD-10-CM

## 2021-08-18 DIAGNOSIS — G47.09 OTHER INSOMNIA: ICD-10-CM

## 2021-08-23 RX ORDER — LISINOPRIL 10 MG/1
10 TABLET ORAL DAILY
Qty: 90 TABLET | Refills: 0 | Status: SHIPPED | OUTPATIENT
Start: 2021-08-23 | End: 2021-12-05 | Stop reason: SDUPTHER

## 2021-08-23 RX ORDER — ZOLPIDEM TARTRATE 5 MG/1
5 TABLET ORAL AS NEEDED
Qty: 30 TABLET | Refills: 0 | Status: SHIPPED | OUTPATIENT
Start: 2021-08-23 | End: 2021-12-05 | Stop reason: SDUPTHER

## 2021-08-23 RX ORDER — ALPRAZOLAM 0.5 MG/1
0.5 TABLET ORAL 2 TIMES DAILY PRN
Qty: 30 TABLET | Refills: 0 | Status: SHIPPED | OUTPATIENT
Start: 2021-08-23 | End: 2021-11-15 | Stop reason: SDUPTHER

## 2021-08-23 RX ORDER — SERTRALINE HYDROCHLORIDE 100 MG/1
200 TABLET, FILM COATED ORAL DAILY
Qty: 180 TABLET | Refills: 0 | Status: SHIPPED | OUTPATIENT
Start: 2021-08-23 | End: 2021-12-05 | Stop reason: SDUPTHER

## 2021-08-23 RX ORDER — VALACYCLOVIR HYDROCHLORIDE 500 MG/1
500 TABLET, FILM COATED ORAL DAILY
Qty: 90 TABLET | Refills: 0 | Status: SHIPPED | OUTPATIENT
Start: 2021-08-23 | End: 2021-12-05 | Stop reason: SDUPTHER

## 2021-08-31 ENCOUNTER — TELEMEDICINE (OUTPATIENT)
Dept: INTERNAL MEDICINE CLINIC | Facility: CLINIC | Age: 31
End: 2021-08-31
Payer: COMMERCIAL

## 2021-08-31 VITALS — HEIGHT: 67 IN | BODY MASS INDEX: 34.53 KG/M2 | WEIGHT: 220 LBS

## 2021-08-31 DIAGNOSIS — F41.9 ANXIETY: Primary | ICD-10-CM

## 2021-08-31 DIAGNOSIS — T50.904A: ICD-10-CM

## 2021-08-31 DIAGNOSIS — F33.1 MODERATE EPISODE OF RECURRENT MAJOR DEPRESSIVE DISORDER (HCC): ICD-10-CM

## 2021-08-31 DIAGNOSIS — G47.09 OTHER INSOMNIA: ICD-10-CM

## 2021-08-31 PROCEDURE — 99214 OFFICE O/P EST MOD 30 MIN: CPT | Performed by: NURSE PRACTITIONER

## 2021-08-31 RX ORDER — BUPROPION HYDROCHLORIDE 150 MG/1
150 TABLET ORAL EVERY MORNING
Qty: 30 TABLET | Refills: 5 | Status: SHIPPED | OUTPATIENT
Start: 2021-08-31 | End: 2021-12-05 | Stop reason: SDUPTHER

## 2021-08-31 RX ORDER — BUSPIRONE HYDROCHLORIDE 7.5 MG/1
7.5 TABLET ORAL 2 TIMES DAILY
Qty: 60 TABLET | Refills: 5 | Status: SHIPPED | OUTPATIENT
Start: 2021-08-31 | End: 2021-12-05 | Stop reason: SDUPTHER

## 2021-08-31 NOTE — ASSESSMENT & PLAN NOTE
Continue to see counselor regularly  Has good support in boyfriend  Will add BuSpar 7 5mg po BID for anxiety  Continue sertraline for now, will consider trying Effexor XR if symptoms fail to improve  Get updated labs: CBC, CMP, TSH  Will continue to fill Xanax for patient in small quantities of 10 tabs at a time  Boyfriend will monitor patient's access to her medication bottles, will use weekly pill sorter  Follow up in 3 weeks or sooner as needed  Reviewed red flag signs to call the office with or go to the Emergency Department with  Patient verbalizes understanding

## 2021-08-31 NOTE — PROGRESS NOTES
This note was not shared with the patient due to patient requested    Virtual Regular Visit    Verification of patient location:    Patient is located in the following state in which I hold an active license [place]      Assessment/Plan:    Problem List Items Addressed This Visit        Other    Overdose of drug/medicinal substance, undetermined intent, initial encounter     Occurred on 7/11/21  Patient did not seek medical attention related to this incident  She thinks she vomited most of the pills  Denies any LT physical effects of the medications: lisinopril, sertraline, xanax, and ambien  Patient is now seeing a counselor, denies active SI  Will get updated labs to assess for any organ damage: CBC, CMP, TSH  As these are still medications patient will be needing, will only prescribe 10 tabs at a time for controlled substances and her boyfriend will help control access to her medications at patient's request    Reviewed red flag signs to call the office with or go to the Emergency Department with  Patient verbalizes understanding  Follow up in 3 weeks or sooner as needed  Other insomnia     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  Previously and since 7/11, patient has not shown any signs of misuse of Ambien  PDMP confirms this  She typically uses medication rarely and only with severe insomnia r/t shift changes  Will continue to prescribe Ambien at this time, will only fill 10 tabs at a time  Boyfriend will control patient's access to medication at patient's request  Boyfriend is known to this prescriber as a reliable support for patient  Patient does not need refill at this time  Will follow up in 3 weeks or sooner as needed  Moderate episode of recurrent major depressive disorder (HCC)     No active SI at this time  Continue with counseling  Continue sertraline for now  Add Wellbutrin XL for depressive symptoms  BuSpar for anxiety also added   If symptoms fail to improve, will consider d/c sertaline, add Effexor XR  Patient contracts for safety at this time  Reviewed red flag signs to call the office with or go to the Emergency Department with  Patient verbalizes understanding  Follow up in 3 weeks or sooner as needed  Relevant Medications    buPROPion (WELLBUTRIN XL) 150 mg 24 hr tablet    busPIRone (BUSPAR) 7 5 mg tablet    Other Relevant Orders    CBC and differential    Comprehensive metabolic panel    Anxiety - Primary     Continue to see counselor regularly  Has good support in boyfriend  Will add BuSpar 7 5mg po BID for anxiety  Continue sertraline for now, will consider trying Effexor XR if symptoms fail to improve  Get updated labs: CBC, CMP, TSH  Will continue to fill Xanax for patient in small quantities of 10 tabs at a time  Boyfriend will monitor patient's access to her medication bottles, will use weekly pill sorter  Follow up in 3 weeks or sooner as needed  Reviewed red flag signs to call the office with or go to the Emergency Department with  Patient verbalizes understanding  Relevant Medications    busPIRone (BUSPAR) 7 5 mg tablet    Other Relevant Orders    TSH, 3rd generation with Free T4 reflex               Reason for visit is   Chief Complaint   Patient presents with    Virtual Regular Visit     [facility] [phone] Discuss medications    Virtual Regular Visit        Encounter provider Jessika Orr    Provider located at [address] [place] [id number]      Recent Visits  No visits were found meeting these conditions    Showing recent visits within past 7 days and meeting all other requirements  Today's Visits  Date Type Provider Dept   08/31/21 Telemedicine ELA[facility]   Showing today's visits and meeting all other requirements  Future Appointments  No visits were found meeting these conditions  Showing future appointments within next 150 days and meeting all other requirements       The patient was identified by name and date of birth  Amy Ash was informed that this is a telemedicine visit and that the visit is being conducted through [address] [place] [address] Road [facility] and patient was informed that this is a secure, HIPAA-compliant platform  She agrees to proceed     My office door was closed  No one else was in the room  She acknowledged consent and understanding of privacy and security of the video platform  The patient has agreed to participate and understands they can discontinue the visit at any time  Patient is aware this is a billable service  Subjective  Amy Ash is a 31 y o  female    Patient presents for an acute visit to discuss her anxiety  Patient reports that on , she took all of her medications and overdosed  She was choking on her vomit and her boyfriend was able to clear her airway and help her continue to vomit the medications she took  She reports that she then took 2 days off of work and started to feel better  She further explains that she didn't particularly have a big trigger that lead to her doing this  She had been drinking a couple of drinks at dinner on an empty stomach  She then got in a small, inconsequential argument with her boyfriend and then admits that she remembers thinking "I'm so tired I just want to sleep"  She then emptied her bottles of ambien, xanax, lisinopril, and sertraline and took all the pills  When asked about her intention with this, she remembers knowing that taking the medications "would not end well", but does not know if it was an intentional attempt to kill herself  She feels that at that time, she was acting illogically and felt "out of my mind"  She says that she is aware that if her boyfriend wasn't home, she would have       She did not seek medical attention related to this, admits that only her boyfriend and her therapist are aware of this occurring  She reports that she feels mostly embarassed, and sad  She doesn't want to feel that way again  She started seeing a therapist after this event, tomorrow will be her 5th session  Her boyfriend now has all of her medications and helps her fill her weekly pill container  He has been very supportive of her during this time and supports her seeking the appropriate help  When asked about her overall anxiety/depressive symptoms, she feels that she gets into "anxiety spirals" and gets overwhelmed at times  She feels that the sertraline is providing her with some benefit, overall, and is somewhat worried about changing this medication  She rarely uses the Xanax, but when she does use it, it is judiciously and with severe anxiety  She uses the Ambien very infrequently, too, as 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 that varies significantly  She would like to continue to be prescribed her medications despite her overdose, as she feels that she has a safety plan in place with her boyfriend and counselor regarding medication access and crisis symptoms  When discussing her previous medications, she reports that over the years, she has tried celexa and lexapro, which were not effective  She then tried prozac, which influenced her libido, and then trintillix, which worked but ended up being cost-prohibitive  She then was prescribed Zoloft and has only had dosage adjustments since then  Past Medical History:   Diagnosis Date    Anxiety     Asthma     Depression     FHx: migraine headaches     High blood pressure        History reviewed  No pertinent surgical history      Current Outpatient Medications   Medication Sig Dispense Refill    ALPRAZolam (XANAX) 0 5 mg tablet Take 1 tablet (0 5 mg total) by mouth 2 (two) times a day as needed for anxiety 30 tablet 0    Galcanezumab-gnlm 120 MG/ML SOAJ 1 subcu injection q30 days 1 mL 11    lisinopril (ZESTRIL) 10 mg tablet Take 1 tablet (10 mg total) by mouth daily 90 tablet 0    methocarbamol (ROBAXIN) 500 mg tablet Take 1 tablet (500 mg total) by mouth daily at bedtime as needed for muscle spasms 30 tablet 0    metoclopramide (REGLAN) 10 mg tablet 1 tab as needed at onset of migraine, can repeat in 8 hours, can combine with triptan/NSAID 20 tablet 3    sertraline (ZOLOFT) 100 mg tablet Take 2 tablets (200 mg total) by mouth daily 180 tablet 0    valACYclovir (VALTREX) 500 mg tablet Take 1 tablet (500 mg total) by mouth daily 90 tablet 0    ZOLMitriptan (ZOMIG) 5 MG tablet 1/2-1 tab at onset of migraine, can repeat in 2 hours, max 10mg/24 hours 12 tablet 3    zolpidem (AMBIEN) 5 mg tablet Take 1 tablet (5 mg total) by mouth as needed for sleep 30 tablet 0    buPROPion (WELLBUTRIN XL) 150 mg 24 hr tablet Take 1 tablet (150 mg total) by mouth every morning 30 tablet 5    busPIRone (BUSPAR) 7 5 mg tablet Take 1 tablet (7 5 mg total) by mouth 2 (two) times a day 60 tablet 5     No current facility-administered medications for this visit  Allergies   Allergen Reactions    Clarithromycin Hives    Tetracyclines & Related Hives       Review of Systems   Constitutional: Positive for fatigue  Respiratory: Negative for shortness of breath  Cardiovascular: Negative for chest pain  Gastrointestinal: Negative for abdominal pain, diarrhea and nausea  Musculoskeletal: Negative for gait problem  Neurological: Negative for dizziness and headaches  Psychiatric/Behavioral: Positive for dysphoric mood and sleep disturbance  The patient is nervous/anxious  See HPI       Video Exam    Vitals:    08/31/21 1658   Weight: 99 8 kg (220 lb)   Height: 5' 7" (1 702 m)       Physical Exam  Constitutional:       General: She is not in acute distress  Appearance: She is well-developed     HENT:      Head: Normocephalic and atraumatic  Eyes:      General: No scleral icterus  Pulmonary:      Effort: Pulmonary effort is normal    Abdominal:      Palpations: Abdomen is soft  Comments: Per patient's self-palpation    Musculoskeletal:      Cervical back: Normal range of motion  Skin:     Findings: No erythema  Neurological:      Mental Status: She is alert and oriented to person, place, and time  Psychiatric:         Attention and Perception: Attention normal          Speech: Speech normal          Behavior: Behavior normal          Thought Content: Thought content normal  Thought content does not include suicidal ideation  Thought content does not include suicidal plan  Judgment: Judgment normal       Comments: Teary but appropriate  I spent 37 minutes directly with the patient during this visit More than half of this time was spent counseling on medication use, treatment options, plan of care  An additional 28 minutes was spent charting this encounter  VIRTUAL VISIT DISCLAIMER      Akila Leonard verbally agrees to participate in [employer]  Pt is aware that [employer] could be limited without vital signs or the ability to perform a full hands-on physical Etta December understands she or the provider may request at any time to terminate the video visit and request the patient to seek care or treatment in person

## 2021-08-31 NOTE — ASSESSMENT & PLAN NOTE
No active SI at this time  Continue with counseling  Continue sertraline for now  Add Wellbutrin XL for depressive symptoms  BuSpar for anxiety also added  If symptoms fail to improve, will consider d/c sertaline, add Effexor XR  Patient contracts for safety at this time  Reviewed red flag signs to call the office with or go to the Emergency Department with  Patient verbalizes understanding  Follow up in 3 weeks or sooner as needed

## 2021-08-31 NOTE — ASSESSMENT & PLAN NOTE
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

## 2021-08-31 NOTE — ASSESSMENT & PLAN NOTE
Occurred on 7/11/21  Patient did not seek medical attention related to this incident  She thinks she vomited most of the pills  Denies any LT physical effects of the medications: lisinopril, sertraline, xanax, and ambien  Patient is now seeing a counselor, denies active SI  Will get updated labs to assess for any organ damage: CBC, CMP, TSH  As these are still medications patient will be needing, will only prescribe 10 tabs at a time for controlled substances and her boyfriend will help control access to her medications at patient's request    Reviewed red flag signs to call the office with or go to the Emergency Department with  Patient verbalizes understanding  Follow up in 3 weeks or sooner as needed

## 2021-09-01 ENCOUNTER — APPOINTMENT (OUTPATIENT)
Dept: LAB | Facility: HOSPITAL | Age: 31
End: 2021-09-01
Payer: COMMERCIAL

## 2021-09-01 ENCOUNTER — APPOINTMENT (OUTPATIENT)
Dept: LAB | Facility: HOSPITAL | Age: 31
End: 2021-09-01

## 2021-09-01 DIAGNOSIS — Z00.00 ROUTINE ADULT HEALTH MAINTENANCE: ICD-10-CM

## 2021-09-01 DIAGNOSIS — F41.9 ANXIETY: ICD-10-CM

## 2021-09-01 DIAGNOSIS — Z00.8 ENCOUNTER FOR OTHER GENERAL EXAMINATION: ICD-10-CM

## 2021-09-01 DIAGNOSIS — Z11.3 SCREENING FOR STDS (SEXUALLY TRANSMITTED DISEASES): ICD-10-CM

## 2021-09-01 DIAGNOSIS — E66.09 CLASS 1 OBESITY DUE TO EXCESS CALORIES WITHOUT SERIOUS COMORBIDITY WITH BODY MASS INDEX (BMI) OF 33.0 TO 33.9 IN ADULT: ICD-10-CM

## 2021-09-01 DIAGNOSIS — F33.1 MODERATE EPISODE OF RECURRENT MAJOR DEPRESSIVE DISORDER (HCC): ICD-10-CM

## 2021-09-01 DIAGNOSIS — I10 ESSENTIAL HYPERTENSION: ICD-10-CM

## 2021-09-01 LAB
ALBUMIN SERPL BCP-MCNC: 3.9 G/DL (ref 3.5–5)
ALP SERPL-CCNC: 82 U/L (ref 46–116)
ALT SERPL W P-5'-P-CCNC: 19 U/L (ref 12–78)
ANION GAP SERPL CALCULATED.3IONS-SCNC: 7 MMOL/L (ref 4–13)
AST SERPL W P-5'-P-CCNC: 11 U/L (ref 5–45)
BASOPHILS # BLD AUTO: 0.05 THOUSANDS/ΜL (ref 0–0.1)
BASOPHILS NFR BLD AUTO: 1 % (ref 0–1)
BILIRUB SERPL-MCNC: 0.42 MG/DL (ref 0.2–1)
BUN SERPL-MCNC: 14 MG/DL (ref 5–25)
CALCIUM SERPL-MCNC: 8.9 MG/DL (ref 8.3–10.1)
CHLORIDE SERPL-SCNC: 106 MMOL/L (ref 100–108)
CHOLEST SERPL-MCNC: 185 MG/DL (ref 50–200)
CO2 SERPL-SCNC: 25 MMOL/L (ref 21–32)
CREAT SERPL-MCNC: 0.65 MG/DL (ref 0.6–1.3)
EOSINOPHIL # BLD AUTO: 0.12 THOUSAND/ΜL (ref 0–0.61)
EOSINOPHIL NFR BLD AUTO: 2 % (ref 0–6)
ERYTHROCYTE [DISTWIDTH] IN BLOOD BY AUTOMATED COUNT: 11.7 % (ref 11.6–15.1)
EST. AVERAGE GLUCOSE BLD GHB EST-MCNC: 100 MG/DL
GFR SERPL CREATININE-BSD FRML MDRD: 121 ML/MIN/1.73SQ M
GLUCOSE SERPL-MCNC: 69 MG/DL (ref 65–140)
HBA1C MFR BLD: 5.1 %
HBV CORE AB SER QL: NORMAL
HBV CORE IGM SER QL: NORMAL
HBV SURFACE AG SER QL: NORMAL
HCT VFR BLD AUTO: 40.5 % (ref 34.8–46.1)
HCV AB SER QL: NORMAL
HDLC SERPL-MCNC: 48 MG/DL
HGB BLD-MCNC: 13.8 G/DL (ref 11.5–15.4)
IMM GRANULOCYTES # BLD AUTO: 0.01 THOUSAND/UL (ref 0–0.2)
IMM GRANULOCYTES NFR BLD AUTO: 0 % (ref 0–2)
LDLC SERPL CALC-MCNC: 125 MG/DL (ref 0–100)
LYMPHOCYTES # BLD AUTO: 2.18 THOUSANDS/ΜL (ref 0.6–4.47)
LYMPHOCYTES NFR BLD AUTO: 33 % (ref 14–44)
MCH RBC QN AUTO: 31.4 PG (ref 26.8–34.3)
MCHC RBC AUTO-ENTMCNC: 34.1 G/DL (ref 31.4–37.4)
MCV RBC AUTO: 92 FL (ref 82–98)
MONOCYTES # BLD AUTO: 0.48 THOUSAND/ΜL (ref 0.17–1.22)
MONOCYTES NFR BLD AUTO: 7 % (ref 4–12)
NEUTROPHILS # BLD AUTO: 3.74 THOUSANDS/ΜL (ref 1.85–7.62)
NEUTS SEG NFR BLD AUTO: 57 % (ref 43–75)
NRBC BLD AUTO-RTO: 0 /100 WBCS
PLATELET # BLD AUTO: 278 THOUSANDS/UL (ref 149–390)
PMV BLD AUTO: 10.9 FL (ref 8.9–12.7)
POTASSIUM SERPL-SCNC: 3.8 MMOL/L (ref 3.5–5.3)
PROT SERPL-MCNC: 7.7 G/DL (ref 6.4–8.2)
RBC # BLD AUTO: 4.39 MILLION/UL (ref 3.81–5.12)
SODIUM SERPL-SCNC: 138 MMOL/L (ref 136–145)
TRIGL SERPL-MCNC: 62 MG/DL
TSH SERPL DL<=0.05 MIU/L-ACNC: 1.37 UIU/ML (ref 0.36–3.74)
WBC # BLD AUTO: 6.58 THOUSAND/UL (ref 4.31–10.16)

## 2021-09-01 PROCEDURE — 85025 COMPLETE CBC W/AUTO DIFF WBC: CPT

## 2021-09-01 PROCEDURE — 86705 HEP B CORE ANTIBODY IGM: CPT

## 2021-09-01 PROCEDURE — 87389 HIV-1 AG W/HIV-1&-2 AB AG IA: CPT

## 2021-09-01 PROCEDURE — 80061 LIPID PANEL: CPT

## 2021-09-01 PROCEDURE — 87340 HEPATITIS B SURFACE AG IA: CPT

## 2021-09-01 PROCEDURE — 84443 ASSAY THYROID STIM HORMONE: CPT

## 2021-09-01 PROCEDURE — 86704 HEP B CORE ANTIBODY TOTAL: CPT

## 2021-09-01 PROCEDURE — 36415 COLL VENOUS BLD VENIPUNCTURE: CPT

## 2021-09-01 PROCEDURE — 83036 HEMOGLOBIN GLYCOSYLATED A1C: CPT

## 2021-09-01 PROCEDURE — 86803 HEPATITIS C AB TEST: CPT

## 2021-09-01 PROCEDURE — 86592 SYPHILIS TEST NON-TREP QUAL: CPT

## 2021-09-01 PROCEDURE — 80053 COMPREHEN METABOLIC PANEL: CPT

## 2021-09-02 LAB
HIV 1+2 AB+HIV1 P24 AG SERPL QL IA: NORMAL
RPR SER QL: NORMAL

## 2021-09-21 ENCOUNTER — TELEMEDICINE (OUTPATIENT)
Dept: INTERNAL MEDICINE CLINIC | Facility: CLINIC | Age: 31
End: 2021-09-21
Payer: COMMERCIAL

## 2021-09-21 VITALS — HEIGHT: 67 IN | WEIGHT: 220 LBS | BODY MASS INDEX: 34.53 KG/M2

## 2021-09-21 DIAGNOSIS — F41.9 ANXIETY: Primary | ICD-10-CM

## 2021-09-21 PROCEDURE — 99214 OFFICE O/P EST MOD 30 MIN: CPT | Performed by: NURSE PRACTITIONER

## 2021-09-21 NOTE — ASSESSMENT & PLAN NOTE
Continue current medications, no dosage adjustments     Continue to follow up with counselor  Use PRN medications when needed to help alleviate panic  Follow up in 3 months or sooner as needed

## 2021-09-21 NOTE — PROGRESS NOTES
Virtual Regular Visit    Verification of patient location:    Patient is located in the following state in which I hold an active license [place]      Assessment/Plan:    Problem List Items Addressed This Visit        Other    Anxiety - Primary     Continue current medications, no dosage adjustments  Continue to follow up with counselor  Use PRN medications when needed to help alleviate panic  Follow up in 3 months or sooner as needed               BMI Counseling: Body mass index is 34 46 kg/m²  The BMI is above normal  Nutrition recommendations include encouraging healthy choices of fruits and vegetables  Rationale for BMI follow-up plan is due to patient being overweight or obese  Reason for visit is   Chief Complaint   Patient presents with    Virtual Regular Visit     Lila Jackparish-- [phone] b/w results        Encounter provider ELAINE Yates    Provider located at [address] [place] [id number]      Recent Visits  No visits were found meeting these conditions  Showing recent visits within past 7 days and meeting all other requirements  Today's Visits  Date Type Provider Dept   09/21/21 Telemedicine ELAINE Vazquze [facility]   Showing today's visits and meeting all other requirements  Future Appointments  No visits were found meeting these conditions  Showing future appointments within next 150 days and meeting all other requirements       The patient was identified by name and date of birth  Nadegecarlie Rader was informed that this is a telemedicine visit and that the visit is being conducted through [address] [place] [address] Now and patient was informed that this is a secure, HIPAA-compliant platform  She agrees to proceed     My office door was closed  No one else was in the room    She acknowledged consent and understanding of privacy and security of the video platform  The patient has agreed to participate and understands they can discontinue the visit at any time  Patient is aware this is a billable service  Subjective  Enrique Leslie is a 31 y o  female    Patient presents for a 3 week follow up on anxiety/depression  She reports that she feels like the severity of her anxiety is decreasing, but not the frequency  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, which was overwhelming and she feels that she has a lot of stressors that contribute to her anxiety  She denies new concerns  She would like to continue her present dosing of medication and see if it starts to provide more benefit  Labs reviewed  Past Medical History:   Diagnosis Date    Anxiety     Asthma     Depression     FHx: migraine headaches     High blood pressure        History reviewed  No pertinent surgical history      Current Outpatient Medications   Medication Sig Dispense Refill    ALPRAZolam (XANAX) 0 5 mg tablet Take 1 tablet (0 5 mg total) by mouth 2 (two) times a day as needed for anxiety 30 tablet 0    buPROPion (WELLBUTRIN XL) 150 mg 24 hr tablet Take 1 tablet (150 mg total) by mouth every morning 30 tablet 5    busPIRone (BUSPAR) 7 5 mg tablet Take 1 tablet (7 5 mg total) by mouth 2 (two) times a day 60 tablet 5    Galcanezumab-gnlm 120 MG/ML SOAJ 1 subcu injection q30 days 1 mL 11    lisinopril (ZESTRIL) 10 mg tablet Take 1 tablet (10 mg total) by mouth daily 90 tablet 0    methocarbamol (ROBAXIN) 500 mg tablet Take 1 tablet (500 mg total) by mouth daily at bedtime as needed for muscle spasms 30 tablet 0    metoclopramide (REGLAN) 10 mg tablet 1 tab as needed at onset of migraine, can repeat in 8 hours, can combine with triptan/NSAID 20 tablet 3    sertraline (ZOLOFT) 100 mg tablet Take 2 tablets (200 mg total) by mouth daily 180 tablet 0    valACYclovir (VALTREX) 500 mg tablet Take 1 tablet (500 mg total) by mouth daily 90 tablet 0    ZOLMitriptan (ZOMIG) 5 MG tablet 1/2-1 tab at onset of migraine, can repeat in 2 hours, max 10mg/24 hours 12 tablet 3    zolpidem (AMBIEN) 5 mg tablet Take 1 tablet (5 mg total) by mouth as needed for sleep 30 tablet 0     No current facility-administered medications for this visit  Allergies   Allergen Reactions    Clarithromycin Hives    Tetracyclines & Related Hives       Review of Systems   Constitutional: Negative for chills, fatigue and fever  Respiratory: Negative for shortness of breath  Cardiovascular: Negative for chest pain  Gastrointestinal: Negative for abdominal pain, diarrhea, nausea and vomiting  Musculoskeletal: Negative for gait problem  Skin: Negative for rash  Neurological: Negative for dizziness, light-headedness and headaches  Psychiatric/Behavioral: Positive for sleep disturbance  The patient is nervous/anxious  Video Exam    Vitals:    09/21/21 1634   Weight: 99 8 kg (220 lb)   Height: 5' 7" (1 702 m)       Physical Exam  Constitutional:       General: She is not in acute distress  Appearance: She is well-developed  HENT:      Head: Normocephalic and atraumatic  Eyes:      General: No scleral icterus  Pulmonary:      Effort: Pulmonary effort is normal    Abdominal:      Palpations: Abdomen is soft  Comments: Per patient's self-palpation    Musculoskeletal:      Cervical back: Normal range of motion  Skin:     Findings: No erythema  Neurological:      Mental Status: She is alert and oriented to person, place, and time  Psychiatric:         Behavior: Behavior normal          Thought Content: Thought content normal           I spent 26 minutes directly with the patient during this visit    VIRTUAL VISIT DISCLAIMER      Sam Pyle verbally agrees to participate in [employer]   Pt is aware that [employer] could be limited without vital signs or the ability to perform a full hands-on physical Marshaylee Kay understands she or the provider may request at any time to terminate the video visit and request the patient to seek care or treatment in person

## 2021-09-24 ENCOUNTER — TELEPHONE (OUTPATIENT)
Dept: OTHER | Facility: HOSPITAL | Age: 31
End: 2021-09-24

## 2021-09-24 DIAGNOSIS — Z20.822 OCCUPATIONAL EXPOSURE TO COVID-19 VIRUS: Primary | ICD-10-CM

## 2021-09-24 NOTE — TELEPHONE ENCOUNTER
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

## 2021-09-25 ENCOUNTER — IMMUNIZATIONS (OUTPATIENT)
Dept: FAMILY MEDICINE CLINIC | Facility: HOSPITAL | Age: 31
End: 2021-09-25
Payer: COMMERCIAL

## 2021-09-25 DIAGNOSIS — Z23 ENCOUNTER FOR IMMUNIZATION: Primary | ICD-10-CM

## 2021-09-25 PROCEDURE — U0005 INFEC AGEN DETEC AMPLI PROBE: HCPCS | Performed by: OBSTETRICS & GYNECOLOGY

## 2021-09-25 PROCEDURE — U0003 INFECTIOUS AGENT DETECTION BY NUCLEIC ACID (DNA OR RNA); SEVERE ACUTE RESPIRATORY SYNDROME CORONAVIRUS 2 (SARS-COV-2) (CORONAVIRUS DISEASE [COVID-19]), AMPLIFIED PROBE TECHNIQUE, MAKING USE OF HIGH THROUGHPUT TECHNOLOGIES AS DESCRIBED BY CMS-2020-01-R: HCPCS | Performed by: OBSTETRICS & GYNECOLOGY

## 2021-09-25 PROCEDURE — 0001A SARS-COV-2 / COVID-19 MRNA VACCINE (PFIZER-BIONTECH) 30 MCG: CPT

## 2021-09-25 PROCEDURE — 91300 SARS-COV-2 / COVID-19 MRNA VACCINE (PFIZER-BIONTECH) 30 MCG: CPT

## 2021-11-02 ENCOUNTER — TELEPHONE (OUTPATIENT)
Dept: NEUROLOGY | Facility: CLINIC | Age: 31
End: 2021-11-02

## 2021-11-05 ENCOUNTER — TELEPHONE (OUTPATIENT)
Dept: NEUROLOGY | Facility: CLINIC | Age: 31
End: 2021-11-05

## 2021-11-15 ENCOUNTER — HOSPITAL ENCOUNTER (EMERGENCY)
Facility: HOSPITAL | Age: 31
Discharge: HOME | End: 2021-11-15
Attending: EMERGENCY MEDICINE
Payer: COMMERCIAL

## 2021-11-15 ENCOUNTER — TELEMEDICINE (OUTPATIENT)
Dept: INTERNAL MEDICINE CLINIC | Facility: CLINIC | Age: 31
End: 2021-11-15
Payer: COMMERCIAL

## 2021-11-15 VITALS
OXYGEN SATURATION: 99 % | TEMPERATURE: 97.9 F | RESPIRATION RATE: 16 BRPM | HEART RATE: 98 BPM | SYSTOLIC BLOOD PRESSURE: 133 MMHG | DIASTOLIC BLOOD PRESSURE: 89 MMHG

## 2021-11-15 DIAGNOSIS — F41.9 ANXIETY: ICD-10-CM

## 2021-11-15 DIAGNOSIS — Z11.52 ENCOUNTER FOR SCREENING FOR COVID-19: Primary | ICD-10-CM

## 2021-11-15 DIAGNOSIS — Z20.822 CONTACT WITH AND (SUSPECTED) EXPOSURE TO COVID-19: ICD-10-CM

## 2021-11-15 DIAGNOSIS — U07.1 COVID-19: Primary | ICD-10-CM

## 2021-11-15 LAB
FLUAV RNA RESP QL NAA+PROBE: NEGATIVE
FLUBV RNA RESP QL NAA+PROBE: NEGATIVE
RSV RNA RESP QL NAA+PROBE: NEGATIVE
SARS-COV-2 RNA RESP QL NAA+PROBE: POSITIVE

## 2021-11-15 PROCEDURE — 99282 EMERGENCY DEPT VISIT SF MDM: CPT | Performed by: EMERGENCY MEDICINE

## 2021-11-15 PROCEDURE — 99283 EMERGENCY DEPT VISIT LOW MDM: CPT

## 2021-11-15 PROCEDURE — 99213 OFFICE O/P EST LOW 20 MIN: CPT | Performed by: NURSE PRACTITIONER

## 2021-11-15 PROCEDURE — 0241U HB NFCT DS VIR RESP RNA 4 TRGT: CPT | Performed by: EMERGENCY MEDICINE

## 2021-11-15 RX ORDER — ALPRAZOLAM 0.5 MG/1
0.5 TABLET ORAL 2 TIMES DAILY PRN
Qty: 30 TABLET | Refills: 0 | Status: SHIPPED | OUTPATIENT
Start: 2021-11-15 | End: 2021-12-05 | Stop reason: SDUPTHER

## 2021-11-15 RX ORDER — ALPRAZOLAM 0.5 MG/1
0.5 TABLET ORAL 2 TIMES DAILY PRN
Qty: 30 TABLET | Refills: 0 | Status: SHIPPED | OUTPATIENT
Start: 2021-11-15 | End: 2021-11-15

## 2021-11-17 ENCOUNTER — TELEPHONE (OUTPATIENT)
Dept: INTERNAL MEDICINE CLINIC | Facility: CLINIC | Age: 31
End: 2021-11-17

## 2021-11-18 ENCOUNTER — TELEMEDICINE (OUTPATIENT)
Dept: INTERNAL MEDICINE CLINIC | Age: 31
End: 2021-11-18
Payer: COMMERCIAL

## 2021-11-18 VITALS — BODY MASS INDEX: 34.53 KG/M2 | WEIGHT: 220 LBS | HEIGHT: 67 IN

## 2021-11-18 DIAGNOSIS — U07.1 COVID-19 VIRUS INFECTION: ICD-10-CM

## 2021-11-18 DIAGNOSIS — J45.20 MILD INTERMITTENT ASTHMA WITHOUT COMPLICATION: Primary | ICD-10-CM

## 2021-11-18 DIAGNOSIS — I10 ESSENTIAL HYPERTENSION: ICD-10-CM

## 2021-11-18 PROCEDURE — 99213 OFFICE O/P EST LOW 20 MIN: CPT | Performed by: INTERNAL MEDICINE

## 2021-11-18 RX ORDER — ALBUTEROL SULFATE 90 UG/1
3 AEROSOL, METERED RESPIRATORY (INHALATION) ONCE AS NEEDED
Status: CANCELLED | OUTPATIENT
Start: 2021-11-19

## 2021-11-18 RX ORDER — SODIUM CHLORIDE 9 MG/ML
20 INJECTION, SOLUTION INTRAVENOUS ONCE
Status: CANCELLED | OUTPATIENT
Start: 2021-11-19

## 2021-11-18 RX ORDER — ACETAMINOPHEN 325 MG/1
650 TABLET ORAL ONCE AS NEEDED
Status: CANCELLED | OUTPATIENT
Start: 2021-11-19

## 2021-11-18 RX ORDER — ONDANSETRON 2 MG/ML
4 INJECTION INTRAMUSCULAR; INTRAVENOUS ONCE AS NEEDED
Status: CANCELLED | OUTPATIENT
Start: 2021-11-19

## 2021-11-19 ENCOUNTER — HOSPITAL ENCOUNTER (OUTPATIENT)
Dept: INFUSION CENTER | Facility: HOSPITAL | Age: 31
Discharge: HOME | End: 2021-11-19
Payer: COMMERCIAL

## 2021-11-19 VITALS
DIASTOLIC BLOOD PRESSURE: 70 MMHG | OXYGEN SATURATION: 99 % | TEMPERATURE: 97.8 F | HEART RATE: 74 BPM | RESPIRATION RATE: 18 BRPM | SYSTOLIC BLOOD PRESSURE: 118 MMHG

## 2021-11-19 DIAGNOSIS — I10 ESSENTIAL HYPERTENSION: Primary | ICD-10-CM

## 2021-11-19 DIAGNOSIS — J45.20 MILD INTERMITTENT ASTHMA WITHOUT COMPLICATION: ICD-10-CM

## 2021-11-19 PROCEDURE — M0245 HB BAMLAN AND ETESEV INF ADMIN: HCPCS | Performed by: INTERNAL MEDICINE

## 2021-11-19 RX ORDER — ONDANSETRON 2 MG/ML
4 INJECTION INTRAMUSCULAR; INTRAVENOUS ONCE AS NEEDED
Status: CANCELLED | OUTPATIENT
Start: 2021-11-19

## 2021-11-19 RX ORDER — ALBUTEROL SULFATE 90 UG/1
3 AEROSOL, METERED RESPIRATORY (INHALATION) ONCE AS NEEDED
Status: DISCONTINUED | OUTPATIENT
Start: 2021-11-19 | End: 2021-11-22 | Stop reason: HOSPADM

## 2021-11-19 RX ORDER — SODIUM CHLORIDE 9 MG/ML
20 INJECTION, SOLUTION INTRAVENOUS ONCE
Status: COMPLETED | OUTPATIENT
Start: 2021-11-19 | End: 2021-11-19

## 2021-11-19 RX ORDER — ACETAMINOPHEN 325 MG/1
650 TABLET ORAL ONCE AS NEEDED
Status: DISCONTINUED | OUTPATIENT
Start: 2021-11-19 | End: 2021-11-22 | Stop reason: HOSPADM

## 2021-11-19 RX ORDER — ALBUTEROL SULFATE 90 UG/1
3 AEROSOL, METERED RESPIRATORY (INHALATION) ONCE AS NEEDED
Status: CANCELLED | OUTPATIENT
Start: 2021-11-19

## 2021-11-19 RX ORDER — ACETAMINOPHEN 325 MG/1
650 TABLET ORAL ONCE AS NEEDED
Status: CANCELLED | OUTPATIENT
Start: 2021-11-19

## 2021-11-19 RX ORDER — SODIUM CHLORIDE 9 MG/ML
20 INJECTION, SOLUTION INTRAVENOUS ONCE
Status: CANCELLED | OUTPATIENT
Start: 2021-11-19

## 2021-11-19 RX ORDER — ONDANSETRON 2 MG/ML
4 INJECTION INTRAMUSCULAR; INTRAVENOUS ONCE AS NEEDED
Status: DISCONTINUED | OUTPATIENT
Start: 2021-11-19 | End: 2021-11-22 | Stop reason: HOSPADM

## 2021-11-19 RX ADMIN — SODIUM CHLORIDE 20 ML/HR: 9 INJECTION, SOLUTION INTRAVENOUS at 14:27

## 2021-11-19 RX ADMIN — SODIUM CHLORIDE 2100 MG COMBINED: 9 INJECTION, SOLUTION INTRAVENOUS at 14:26

## 2021-12-05 DIAGNOSIS — G44.209 TENSION HEADACHE: ICD-10-CM

## 2021-12-05 DIAGNOSIS — G47.09 OTHER INSOMNIA: ICD-10-CM

## 2021-12-05 DIAGNOSIS — B00.1 HERPES LABIALIS: ICD-10-CM

## 2021-12-05 DIAGNOSIS — I10 ESSENTIAL HYPERTENSION: ICD-10-CM

## 2021-12-05 DIAGNOSIS — F41.9 ANXIETY: ICD-10-CM

## 2021-12-05 DIAGNOSIS — F33.1 MODERATE EPISODE OF RECURRENT MAJOR DEPRESSIVE DISORDER (HCC): ICD-10-CM

## 2021-12-06 RX ORDER — BUSPIRONE HYDROCHLORIDE 7.5 MG/1
7.5 TABLET ORAL 2 TIMES DAILY
Qty: 60 TABLET | Refills: 0 | Status: SHIPPED | OUTPATIENT
Start: 2021-12-06 | End: 2022-03-01 | Stop reason: SDUPTHER

## 2021-12-06 RX ORDER — ZOLPIDEM TARTRATE 5 MG/1
5 TABLET ORAL AS NEEDED
Qty: 30 TABLET | Refills: 0 | Status: SHIPPED | OUTPATIENT
Start: 2021-12-06

## 2021-12-06 RX ORDER — VALACYCLOVIR HYDROCHLORIDE 500 MG/1
500 TABLET, FILM COATED ORAL DAILY
Qty: 90 TABLET | Refills: 0 | Status: SHIPPED | OUTPATIENT
Start: 2021-12-06 | End: 2022-03-10 | Stop reason: SDUPTHER

## 2021-12-06 RX ORDER — SERTRALINE HYDROCHLORIDE 100 MG/1
200 TABLET, FILM COATED ORAL DAILY
Qty: 180 TABLET | Refills: 0 | Status: SHIPPED | OUTPATIENT
Start: 2021-12-06 | End: 2022-06-03 | Stop reason: SDUPTHER

## 2021-12-06 RX ORDER — LISINOPRIL 10 MG/1
10 TABLET ORAL DAILY
Qty: 90 TABLET | Refills: 0 | Status: SHIPPED | OUTPATIENT
Start: 2021-12-06 | End: 2022-06-21 | Stop reason: SDUPTHER

## 2021-12-06 RX ORDER — BUPROPION HYDROCHLORIDE 150 MG/1
150 TABLET ORAL EVERY MORNING
Qty: 30 TABLET | Refills: 0 | Status: SHIPPED | OUTPATIENT
Start: 2021-12-06 | End: 2022-01-11 | Stop reason: SDUPTHER

## 2021-12-06 RX ORDER — ALPRAZOLAM 0.5 MG/1
0.5 TABLET ORAL 2 TIMES DAILY PRN
Qty: 30 TABLET | Refills: 0 | Status: SHIPPED | OUTPATIENT
Start: 2021-12-06 | End: 2022-06-03 | Stop reason: SDUPTHER

## 2021-12-06 RX ORDER — METHOCARBAMOL 500 MG/1
500 TABLET, FILM COATED ORAL
Qty: 30 TABLET | Refills: 0 | Status: SHIPPED | OUTPATIENT
Start: 2021-12-06 | End: 2022-01-31 | Stop reason: SDUPTHER

## 2021-12-20 ENCOUNTER — TELEMEDICINE (OUTPATIENT)
Dept: INTERNAL MEDICINE CLINIC | Age: 31
End: 2021-12-20
Payer: COMMERCIAL

## 2021-12-20 VITALS — WEIGHT: 220 LBS | HEIGHT: 67 IN | BODY MASS INDEX: 34.53 KG/M2 | OXYGEN SATURATION: 99 % | HEART RATE: 89 BPM

## 2021-12-20 DIAGNOSIS — G47.09 OTHER INSOMNIA: ICD-10-CM

## 2021-12-20 DIAGNOSIS — U07.1 COVID-19 VIRUS INFECTION: ICD-10-CM

## 2021-12-20 DIAGNOSIS — F41.9 ANXIETY: Primary | ICD-10-CM

## 2021-12-20 DIAGNOSIS — F33.1 MODERATE EPISODE OF RECURRENT MAJOR DEPRESSIVE DISORDER (HCC): ICD-10-CM

## 2021-12-20 PROBLEM — T50.904A: Status: RESOLVED | Noted: 2021-08-31 | Resolved: 2021-12-20

## 2021-12-20 PROCEDURE — 99214 OFFICE O/P EST MOD 30 MIN: CPT | Performed by: NURSE PRACTITIONER

## 2021-12-29 ENCOUNTER — TELEPHONE (OUTPATIENT)
Dept: NEUROLOGY | Facility: CLINIC | Age: 31
End: 2021-12-29

## 2022-01-07 ENCOUNTER — TELEPHONE (OUTPATIENT)
Dept: NEUROLOGY | Facility: CLINIC | Age: 32
End: 2022-01-07

## 2022-01-11 DIAGNOSIS — F33.1 MODERATE EPISODE OF RECURRENT MAJOR DEPRESSIVE DISORDER (HCC): ICD-10-CM

## 2022-01-11 RX ORDER — BUPROPION HYDROCHLORIDE 150 MG/1
150 TABLET ORAL EVERY MORNING
Qty: 30 TABLET | Refills: 0 | Status: SHIPPED | OUTPATIENT
Start: 2022-01-11 | End: 2022-01-31 | Stop reason: SDUPTHER

## 2022-01-12 ENCOUNTER — TELEPHONE (OUTPATIENT)
Dept: NEUROLOGY | Facility: CLINIC | Age: 32
End: 2022-01-12

## 2022-01-12 ENCOUNTER — TELEMEDICINE (OUTPATIENT)
Dept: NEUROLOGY | Facility: CLINIC | Age: 32
End: 2022-01-12
Payer: COMMERCIAL

## 2022-01-12 DIAGNOSIS — G43.109 MIGRAINE WITH AURA AND WITHOUT STATUS MIGRAINOSUS, NOT INTRACTABLE: Primary | ICD-10-CM

## 2022-01-12 DIAGNOSIS — G44.209 TENSION HEADACHE: ICD-10-CM

## 2022-01-12 DIAGNOSIS — G43.009 MIGRAINE WITHOUT AURA AND WITHOUT STATUS MIGRAINOSUS, NOT INTRACTABLE: ICD-10-CM

## 2022-01-12 PROCEDURE — 99212 OFFICE O/P EST SF 10 MIN: CPT | Performed by: PHYSICIAN ASSISTANT

## 2022-01-12 NOTE — TELEPHONE ENCOUNTER
Spoke to patient and she said she will contact office back to set up 6 month follow up with Blayne Vazquez as she is at work right now and cant talk

## 2022-01-12 NOTE — TELEPHONE ENCOUNTER
----- Message from Hung Yi PA-C sent at 1/12/2022 11:07 AM EST -----  Regarding: f/u  6 months f/u with me or Dr Phuong Hill

## 2022-01-12 NOTE — PROGRESS NOTES
Virtual Regular Visit    Verification of patient location:    Patient is located in the following state in which I hold an active license [place]      Assessment/Plan:    Problem List Items Addressed This Visit        Cardiovascular and Mediastinum    Migraine with aura and without status migrainosus, not intractable - Primary    Migraine without aura and without status migrainosus, not intractable       Other    Tension headache          She in continues to do well with her monthly dose of Emgality and continues to report success in that she has had zero migraine with aura since starting it, and zero migraines in general   She continues to have low-grade or tension type headaches in between for which she will drink more water, or use over-the-counter analgesics  Encouraged no more than 3 doses of over-the-counter analgesic per week to prevent medication overuse headache  If she has a breakthrough migraine, whether it is migraine without aura or classic migraine, she can use Zomig to try to abort it at the earliest onset of the migraine/aura, can add Reglan for nausea  She uses methocarbamol p r n  Neck tension/tension-type headache  She rarely uses these medications though  The patient should not hesitate to call me prior to her follow up with any questions or concerns  Reason for visit is   Chief Complaint   Patient presents with    Virtual Regular Visit        Encounter provider Darrian Hemphill PA-C    Provider located at [address] [id number]      Recent Visits  No visits were found meeting these conditions    Showing recent visits within past 7 days and meeting all other requirements  Today's Visits  Date Type Provider Dept   01/12/22 Telephone Zach Ramos PA-C [facility]   01/12/22 Telemedicine Zach Ramos PA-C [facility]   Showing today's visits and meeting all other requirements  Future Appointments  No visits were found meeting these conditions  Showing future appointments within next 150 days and meeting all other requirements       The patient was identified by name and date of birth  Dayana Tracey was informed that this is a telemedicine visit and that the visit is being conducted through [address] and patient was informed this is a secure, HIPAA-complaint platform  She agrees to proceed     My office door was closed  No one else was in the room  She acknowledged consent and understanding of privacy and security of the video platform  The patient has agreed to participate and understands they can discontinue the visit at any time  Patient is aware this is a billable service  Subjective  Dayana Tracey is a 36 y o  female who is contacted via telemedicine for neurological follow-up  HPI   Since I last saw the patient in July she continues to do well with the current dose of Emgality injection Q 30 days  Sometimes she forgets to take it and a few days after her scheduled date to take it she experiences more headaches  This prompted her to remember to take the Emgality then  She is going to try to put alerts on her calender to remind her to take it  First dose of Emgality was April 2021 approximately  She has not had any migraine headaches or classic migraine/migraine with aura since starting the Emgality  She is very satisfied with the medication and the effectiveness  She denies side effects  She does have low-grade headaches or tension-type headaches several times per month, and these are variable in frequency and triggered by dehydration or stress  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  Prior to starting Emgality she would get about 1 severe migraine headache per week, and maybe 1-2 migraine auras per month   She is thankful that she has not had any migraine aura since starting Emgality, because this would significantly disrupt her ability to perform surgery  She denies any vision changes, diplopia or loss of vision  She states she only used sumatriptan once and in general prefers not to take it because it causes side effects  Thankfully she has not had to use anyway  She has methocarbamol on hand for when she gets tension in the neck and upper back, but also rarely uses this  She has Reglan p r n  Nausea  She is trying to continue to drink water frequently throughout the day, limit caffeine  She was just engaged and planning a marriage in 2023  Prior note 7/23/21: 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 several years ago when she was in college  She has been on lisinopril and her blood pressure has been maintained  Today is 115/56  She had been experiencing migraine auras years prior to and after the hemorrhage and she does not note a difference in the migraine aura since the hemorrhage, nor a correlation      The aura is consistent with flashing lights which last anywhere from 30-60 minutes  When she had the hemorrhage it lasted longer and it was consistent with black spots only  Her vision did improve and is back to baseline since the hemorrhage  Note per Dr Lanie Qiu 4/7/21:  Temporal Pattern of Headaches: They started having HA's 16 years ago at 17 yo     Is there any particular time of day that is worse: no  Do they awaken from sleep?: yes - occasionally maybe once a month          Is there any seasonal pattern?: no  Any relationship to menstrual cycle: no  'Clustering' of HA's over time? no  Have you found any triggers? stress, change in sleep pattern     Description of Headaches:   Do you have a specific aura? photopsias, if so, do you ever get aura without headache? no, or headache without aura?  yes   Are there any other prodromal sx?: only the aura        How long do they last? 30 minutes     What is the typical location of pain: right-sided unilateral, left-sided unilateral     What is the character of pain:pulsating, sharp and stabbing      How severe is the pain for a typical headache? 7, tension headaches 4  What is the maximum severity of pain? 10     Accompanying symptoms:nausea, decreased social functioning, photophobia, phonophobia,      Rapidity of onset: sudden  Typical duration of individual headache: 30 minutes with aura           Frequency of headaches: migraines once a month, tension headaches 2-3 times a week  Are most headaches similar in presentation? yes  Exacerbating factors if any: none     Do you have multiple headache types? 3 Types- 1  Classic migraine, visual aura flashing lights for 30 minutes then headache starts and lasts 4 hours and then has 2 hours of nausea s/p headache  2  Migraine pain on left side but without aura and no nausea  3  Milder headache, tight band sensation, associated with stress and lack of sleep     Therapeutics:  CURRENT Abortive meds? Rizatriptan/benadryl/tylenol/robaxin taken together effectiveness unclear   still has to sleep it off  How often do you use them? Once a month  CURRENT Preventive meds? none   CURRENT Non-Medical/Alternative treatments for HA: massage      PRIOR PREVENTATIVE medications: Oxcaebazepine (no help), Zoloft (no help)  PRIOR ABORTIVE mediations: acetaminophen, NSAIDs (ibuprofen), aspirin/acetaminophen/caffeine     Additional Relevant History:  History of severe head/neck trauma? History of 3 concussions in high school and one 6 years ago and misaligned cervical spine  History of head/neck surgery?  no  Do you have any family history of headache? no known family members with significant headaches  Family history of aneurysms? no    Past Medical History:   Diagnosis Date    Anxiety     Asthma     Depression     FHx: migraine headaches     High blood pressure     Overdose of drug/medicinal substance, undetermined intent, initial encounter 8/31/2021 No past surgical history on file  Current Outpatient Medications   Medication Sig Dispense Refill    ALPRAZolam (XANAX) 0 5 mg tablet Take 1 tablet (0 5 mg total) by mouth 2 (two) times a day as needed for anxiety 30 tablet 0    buPROPion (WELLBUTRIN XL) 150 mg 24 hr tablet Take 1 tablet (150 mg total) by mouth every morning 30 tablet 0    busPIRone (BUSPAR) 7 5 mg tablet Take 1 tablet (7 5 mg total) by mouth 2 (two) times a day 60 tablet 0    Galcanezumab-gnlm 120 MG/ML SOAJ 1 subcu injection q30 days 1 mL 11    lisinopril (ZESTRIL) 10 mg tablet Take 1 tablet (10 mg total) by mouth daily 90 tablet 0    methocarbamol (ROBAXIN) 500 mg tablet Take 1 tablet (500 mg total) by mouth daily at bedtime as needed for muscle spasms 30 tablet 0    metoclopramide (REGLAN) 10 mg tablet 1 tab as needed at onset of migraine, can repeat in 8 hours, can combine with triptan/NSAID 20 tablet 3    sertraline (ZOLOFT) 100 mg tablet Take 2 tablets (200 mg total) by mouth daily 180 tablet 0    valACYclovir (VALTREX) 500 mg tablet Take 1 tablet (500 mg total) by mouth daily 90 tablet 0    ZOLMitriptan (ZOMIG) 5 MG tablet 1/2-1 tab at onset of migraine, can repeat in 2 hours, max 10mg/24 hours 12 tablet 3    zolpidem (AMBIEN) 5 mg tablet Take 1 tablet (5 mg total) by mouth as needed for sleep 30 tablet 0     No current facility-administered medications for this visit  Allergies   Allergen Reactions    Clarithromycin Hives    Tetracyclines & Related Hives       Review of Systems   Constitutional: Negative for chills and fever  HENT: Negative for ear pain and sore throat  Eyes: Negative for pain and visual disturbance  Respiratory: Negative for cough and shortness of breath  Cardiovascular: Negative for chest pain and palpitations  Gastrointestinal: Negative for abdominal pain and vomiting  Endocrine: Negative  Negative for cold intolerance and heat intolerance     Genitourinary: Negative for dysuria and hematuria  Musculoskeletal: Negative for arthralgias and back pain  Skin: Negative for color change and rash  Allergic/Immunologic: Negative  Negative for immunocompromised state  Neurological: Positive for headaches (lower grade headaches, no migraines since last seen)  Negative for seizures and syncope  All other systems reviewed and are negative  The following portions of the patient's history were reviewed and updated as appropriate: allergies, current medications/ medication history, past family history, past medical history, past social history, past surgical history and problem list     Review of systems was reviewed and otherwise unremarkable from a neurological perspective  Video Exam    There were no vitals filed for this visit  Physical Exam   Neurological exam:  On neurologic exam, the patient is alert and oriented to time and place  Speech is fluent and articulate, and the patient follows commands appropriately  Judgment and affect appear normal   Extraocular muscles are intact without nystagmus  Face is symmetric  Hearing is intact bilaterally  Motor examination reveals adequate range of motion  I spent 15 minutes directly with the patient during this visit       VIRTUAL VISIT [address] [place] [address] verbally agrees to participate in [employer]  Pt is aware that [employer] could be limited without vital signs or the ability to perform a full hands-on physical Ricardo Garcia understands she or the provider may request at any time to terminate the video visit and request the patient to seek care or treatment in person

## 2022-01-31 DIAGNOSIS — G44.209 TENSION HEADACHE: ICD-10-CM

## 2022-01-31 DIAGNOSIS — F33.1 MODERATE EPISODE OF RECURRENT MAJOR DEPRESSIVE DISORDER (HCC): ICD-10-CM

## 2022-01-31 RX ORDER — METHOCARBAMOL 500 MG/1
500 TABLET, FILM COATED ORAL
Qty: 30 TABLET | Refills: 0 | Status: SHIPPED | OUTPATIENT
Start: 2022-01-31

## 2022-01-31 RX ORDER — BUPROPION HYDROCHLORIDE 150 MG/1
150 TABLET ORAL EVERY MORNING
Qty: 30 TABLET | Refills: 0 | Status: SHIPPED | OUTPATIENT
Start: 2022-01-31 | End: 2022-02-28 | Stop reason: SDUPTHER

## 2022-02-28 ENCOUNTER — PATIENT MESSAGE (OUTPATIENT)
Dept: INTERNAL MEDICINE CLINIC | Facility: OTHER | Age: 32
End: 2022-02-28

## 2022-02-28 DIAGNOSIS — F33.1 MODERATE EPISODE OF RECURRENT MAJOR DEPRESSIVE DISORDER (HCC): ICD-10-CM

## 2022-02-28 DIAGNOSIS — B00.1 HERPES LABIALIS: ICD-10-CM

## 2022-03-01 DIAGNOSIS — F41.9 ANXIETY: ICD-10-CM

## 2022-03-01 RX ORDER — BUPROPION HYDROCHLORIDE 150 MG/1
150 TABLET ORAL EVERY MORNING
Qty: 30 TABLET | Refills: 0 | Status: SHIPPED | OUTPATIENT
Start: 2022-03-01 | End: 2022-05-05 | Stop reason: SDUPTHER

## 2022-03-01 RX ORDER — BUSPIRONE HYDROCHLORIDE 7.5 MG/1
7.5 TABLET ORAL 2 TIMES DAILY
Qty: 180 TABLET | Refills: 0 | Status: SHIPPED | OUTPATIENT
Start: 2022-03-01

## 2022-03-10 RX ORDER — VALACYCLOVIR HYDROCHLORIDE 500 MG/1
500 TABLET, FILM COATED ORAL DAILY
Qty: 90 TABLET | Refills: 0 | Status: SHIPPED | OUTPATIENT
Start: 2022-03-10 | End: 2022-06-21 | Stop reason: SDUPTHER

## 2022-03-17 ENCOUNTER — OFFICE VISIT (OUTPATIENT)
Dept: INTERNAL MEDICINE CLINIC | Facility: OTHER | Age: 32
End: 2022-03-17
Payer: COMMERCIAL

## 2022-03-17 VITALS
HEIGHT: 67 IN | OXYGEN SATURATION: 98 % | SYSTOLIC BLOOD PRESSURE: 104 MMHG | TEMPERATURE: 98.6 F | DIASTOLIC BLOOD PRESSURE: 60 MMHG | BODY MASS INDEX: 36.73 KG/M2 | HEART RATE: 89 BPM | WEIGHT: 234 LBS

## 2022-03-17 DIAGNOSIS — F33.1 MODERATE EPISODE OF RECURRENT MAJOR DEPRESSIVE DISORDER (HCC): ICD-10-CM

## 2022-03-17 DIAGNOSIS — E66.01 CLASS 2 SEVERE OBESITY DUE TO EXCESS CALORIES WITH SERIOUS COMORBIDITY AND BODY MASS INDEX (BMI) OF 36.0 TO 36.9 IN ADULT (HCC): ICD-10-CM

## 2022-03-17 DIAGNOSIS — G43.109 MIGRAINE WITH AURA AND WITHOUT STATUS MIGRAINOSUS, NOT INTRACTABLE: ICD-10-CM

## 2022-03-17 DIAGNOSIS — Z00.00 ANNUAL PHYSICAL EXAM: ICD-10-CM

## 2022-03-17 DIAGNOSIS — G47.09 OTHER INSOMNIA: ICD-10-CM

## 2022-03-17 DIAGNOSIS — I10 ESSENTIAL HYPERTENSION: Primary | ICD-10-CM

## 2022-03-17 PROCEDURE — 99214 OFFICE O/P EST MOD 30 MIN: CPT | Performed by: NURSE PRACTITIONER

## 2022-03-17 PROCEDURE — 99395 PREV VISIT EST AGE 18-39: CPT | Performed by: NURSE PRACTITIONER

## 2022-03-17 NOTE — ASSESSMENT & PLAN NOTE
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

## 2022-03-17 NOTE — PROGRESS NOTES
ADULT ANNUAL PHYSICAL  [facility] [place]    NAME: Amita Lo  AGE: 36 y o  SEX: female  : 1990     DATE: 3/17/2022     Assessment and Plan:     Problem List Items Addressed This Visit        Cardiovascular and Mediastinum    Migraine with aura and without status migrainosus, not intractable    Essential hypertension - Primary       Other    Other insomnia    Annual physical exam     - healthy 72-year-old female  - currently a 4th year OBGYN resident with [address] to improve her diet and exercise regimen  - referral given weight management  - reports Tdap was given 8 years ago  - labs up-to-date           Other Visit Diagnoses     Class 2 severe obesity due to excess calories with serious comorbidity and body mass index (BMI) of 36 0 to 36 9 in adult [facility])        Relevant Orders    Ambulatory Referral to Weight Management          Immunizations and preventive care screenings were discussed with patient today  Appropriate education was printed on patient's after visit summary  Return in about 4 months (around 2022)  Chief Complaint:     Chief Complaint   Patient presents with    Physical Exam     yearly phys      History of Present Illness:     Adult Annual Physical   Patient here for a comprehensive physical exam      Diet and Physical Activity  · Diet/Nutrition: poor, quick and processed foods  interested in seeing weight management   · Exercise: no formal exercise  BMI Counseling: Body mass index is 36 65 kg/m²  The BMI is above normal  Nutrition recommendations include 3-5 servings of fruits/vegetables daily, moderation in carbohydrate intake, increasing intake of lean protein and reducing intake of saturated fat and trans fat  Exercise recommendations include moderate aerobic physical activity for 150 minutes/week       Depression Screening  PHQ-2/9 Depression Screening         General Health  · Sleep: sleeps well  · Hearing: normal - bilateral   · Vision: most recent eye exam >1 year ago and wears glasses  · Dental: regular dental visits  /GYN Health  · Last menstrual period: 3/23/21 normal   · Contraceptive method: IUD placement  · History of STDs?: no      Review of Systems:     Review of Systems   Constitutional: Negative for activity change, appetite change, chills, diaphoresis, fatigue, fever and unexpected weight change  Eyes: Negative for visual disturbance  Respiratory: Negative for cough, chest tightness, shortness of breath and wheezing  Cardiovascular: Negative for chest pain and palpitations  Gastrointestinal: Negative for abdominal distention, abdominal pain, blood in stool, constipation, diarrhea, nausea and vomiting  Genitourinary: Negative for difficulty urinating, dysuria, frequency, hematuria, urgency, vaginal bleeding, vaginal discharge and vaginal pain  Musculoskeletal: Negative for arthralgias, gait problem and joint swelling  Skin: Negative for rash  Neurological: Negative for dizziness, syncope, light-headedness and headaches  Psychiatric/Behavioral: Positive for dysphoric mood (controlled)  Negative for self-injury, sleep disturbance and suicidal ideas  The patient is nervous/anxious (controlled)  Past Medical History:     Past Medical History:   Diagnosis Date    Anxiety     Asthma     Depression     FHx: migraine headaches     High blood pressure     Overdose of drug/medicinal substance, undetermined intent, initial encounter 8/31/2021      Past Surgical History:     History reviewed  No pertinent surgical history     Social History:     Social History     Socioeconomic History    Marital status:      Spouse name: None    Number of children: None    Years of education: None    Highest education level: None   Occupational History    None   Tobacco Use    Smoking status: Never Smoker    Smokeless tobacco: Never Used Vaping Use    Vaping Use: Never used   Substance and Sexual Activity    Alcohol use: Yes     Comment: social    Drug use: No    Sexual activity: Yes   Other Topics Concern    None   Social History Narrative    None     Social Determinants of Health     Financial Resource Strain: Not on file   Food Insecurity: Not on file   Transportation Needs: Not on file   Physical Activity: Not on file   Stress: Not on file   Social Connections: Not on file   Intimate Partner Violence: Not on file   Housing Stability: Not on file      Family History:     Family History   Problem Relation Age of Onset    Hypertension Mother     Atrial fibrillation Mother     Stroke Mother     Depression Mother     Hypertension Father     Stroke Maternal Grandmother     Hypertension Maternal Grandmother     Atrial fibrillation Maternal Grandmother     Stroke Maternal Grandfather     Hypertension Maternal Grandfather     Hypertension Paternal Grandfather       Current Medications:     Current Outpatient Medications   Medication Sig Dispense Refill    ALPRAZolam (XANAX) 0 5 mg tablet Take 1 tablet (0 5 mg total) by mouth 2 (two) times a day as needed for anxiety 30 tablet 0    buPROPion (WELLBUTRIN XL) 150 mg 24 hr tablet Take 1 tablet (150 mg total) by mouth every morning 30 tablet 0    busPIRone (BUSPAR) 7 5 mg tablet Take 1 tablet (7 5 mg total) by mouth 2 (two) times a day 180 tablet 0    Galcanezumab-gnlm 120 MG/ML SOAJ 1 subcu injection q30 days 1 mL 11    lisinopril (ZESTRIL) 10 mg tablet Take 1 tablet (10 mg total) by mouth daily 90 tablet 0    methocarbamol (ROBAXIN) 500 mg tablet Take 1 tablet (500 mg total) by mouth daily at bedtime as needed for muscle spasms 30 tablet 0    metoclopramide (REGLAN) 10 mg tablet 1 tab as needed at onset of migraine, can repeat in 8 hours, can combine with triptan/NSAID 20 tablet 3    sertraline (ZOLOFT) 100 mg tablet Take 2 tablets (200 mg total) by mouth daily 180 tablet 0    valACYclovir (VALTREX) 500 mg tablet Take 1 tablet (500 mg total) by mouth daily 90 tablet 0    ZOLMitriptan (ZOMIG) 5 MG tablet 1/2-1 tab at onset of migraine, can repeat in 2 hours, max 10mg/24 hours 12 tablet 3    zolpidem (AMBIEN) 5 mg tablet Take 1 tablet (5 mg total) by mouth as needed for sleep 30 tablet 0     No current facility-administered medications for this visit  Allergies: Allergies   Allergen Reactions    Clarithromycin Hives    Tetracyclines & Related Hives      Physical Exam:     /60 (BP Location: Left arm, Patient Position: Sitting, Cuff Size: Large)   Pulse 89   Temp 98 6 °F (37 °C) (Temporal)   Ht 5' 7" (1 702 m)   Wt 106 kg (234 lb)   SpO2 98% Comment: ra  BMI 36 65 kg/m²     Physical Exam  Vitals reviewed  Constitutional:       General: She is not in acute distress  Appearance: Normal appearance  She is obese  She is not diaphoretic  HENT:      Head: Normocephalic and atraumatic  Right Ear: Tympanic membrane and external ear normal       Left Ear: Tympanic membrane and external ear normal       Nose: Nose normal  No congestion or rhinorrhea  Mouth/Throat:      Mouth: Mucous membranes are moist       Pharynx: Oropharynx is clear  No oropharyngeal exudate or posterior oropharyngeal erythema  Eyes:      Extraocular Movements: Extraocular movements intact  Conjunctiva/sclera: Conjunctivae normal       Pupils: Pupils are equal, round, and reactive to light  Neck:      Thyroid: No thyromegaly  Vascular: No carotid bruit  Cardiovascular:      Rate and Rhythm: Normal rate and regular rhythm  Heart sounds: Normal heart sounds  No murmur heard  Pulmonary:      Effort: Pulmonary effort is normal  No respiratory distress  Breath sounds: Normal breath sounds  No wheezing, rhonchi or rales  Chest:   Breasts:      Right: No axillary adenopathy or supraclavicular adenopathy        Left: No axillary adenopathy or supraclavicular adenopathy  Comments: Declined breast exam   Abdominal:      General: Bowel sounds are normal  There is no distension  Palpations: Abdomen is soft  There is no mass  Tenderness: There is no abdominal tenderness  There is no guarding  Musculoskeletal:      Cervical back: Normal range of motion and neck supple  Right lower leg: No edema  Left lower leg: No edema  Lymphadenopathy:      Cervical: No cervical adenopathy  Upper Body:      Right upper body: No supraclavicular, axillary, pectoral or epitrochlear adenopathy  Left upper body: No supraclavicular, axillary, pectoral or epitrochlear adenopathy  Skin:     General: Skin is warm and dry  Neurological:      Mental Status: She is alert and oriented to person, place, and time  Mental status is at baseline  Motor: No weakness        Gait: Gait normal    Psychiatric:         Mood and Affect: Mood normal          Behavior: Behavior normal           Ajay Navarro, [address] [facility]

## 2022-03-17 NOTE — ASSESSMENT & PLAN NOTE
- controlled on sertraline 200 mg, Wellbutrin  mg, BuSpar 7 5 mg b i d   - follows with a counselor weekly

## 2022-03-17 NOTE — ASSESSMENT & PLAN NOTE
- controlled on sertraline 200 mg, Wellbutrin  mg, BuSpar 7 5 mg b i d   - uses Xanax about twice a week  - follows with a counselor weekly

## 2022-03-17 NOTE — PROGRESS NOTES
Assessment/Plan:    Problem List Items Addressed This Visit        Cardiovascular and Mediastinum    Migraine with aura and without status migrainosus, not intractable     - followed by neurology  - migraines very well controlled on emgality           Essential hypertension - Primary     - well controlled on lisinopril 10 mg daily            Other    Other insomnia     - p r n  Use of Ambien 5 mg         Moderate episode of recurrent major depressive disorder (HCC)     - controlled on sertraline 200 mg, Wellbutrin  mg, BuSpar 7 5 mg b i d   - follows with a counselor weekly         Annual physical exam     - healthy 79 year-old female  - currently a 4th year OBGYN resident with [address] to improve her diet and exercise regimen  - referral given weight management  - reports Tdap was given 8 years ago  - labs up-to-date           Other Visit Diagnoses     Class 2 severe obesity due to excess calories with serious comorbidity and body mass index (BMI) of 36 0 to 36 9 in adult [facility])        Relevant Orders    Ambulatory Referral to Weight Management          M*Modal software was used to dictate this note  It may contain errors with dictating incorrect words or incorrect spelling  Please contact the provider directly with any questions  Subjective:      Patient ID: Mahesh Doan is a 36 y o  female  HPI    Patient presents today for routine follow up  HTN  - well controlled on lisinopril 10mg daily  Anxiety - controlled on sertraline 200mg daily, Wellbutrin XL 150mg and buspar 7 5mg twice daily  She uses her xanax 0 5mg twice daily  She follows with a counselor weekly    Migraine- she follows with neurology and gets emgality injections monthly with neuro  She has not needed her triptan since starting the injections       Insomnia - using ambien as needed     The following portions of the patient's history were reviewed and updated as appropriate: allergies, current medications, past family history, past medical history, past social history, past surgical history and problem list     Review of Systems   Constitutional: Negative for chills, fever and unexpected weight change  Respiratory: Negative for cough, chest tightness, shortness of breath and wheezing  Psychiatric/Behavioral: Positive for dysphoric mood (controlled)  Negative for self-injury, sleep disturbance and suicidal ideas  The patient is nervous/anxious (controlled)            Past Medical History:   Diagnosis Date    Anxiety     Asthma     Depression     FHx: migraine headaches     High blood pressure     Overdose of drug/medicinal substance, undetermined intent, initial encounter 8/31/2021         Current Outpatient Medications:     ALPRAZolam (XANAX) 0 5 mg tablet, Take 1 tablet (0 5 mg total) by mouth 2 (two) times a day as needed for anxiety, Disp: 30 tablet, Rfl: 0    buPROPion (WELLBUTRIN XL) 150 mg 24 hr tablet, Take 1 tablet (150 mg total) by mouth every morning, Disp: 30 tablet, Rfl: 0    busPIRone (BUSPAR) 7 5 mg tablet, Take 1 tablet (7 5 mg total) by mouth 2 (two) times a day, Disp: 180 tablet, Rfl: 0    Galcanezumab-gnlm 120 MG/ML SOAJ, 1 subcu injection q30 days, Disp: 1 mL, Rfl: 11    lisinopril (ZESTRIL) 10 mg tablet, Take 1 tablet (10 mg total) by mouth daily, Disp: 90 tablet, Rfl: 0    methocarbamol (ROBAXIN) 500 mg tablet, Take 1 tablet (500 mg total) by mouth daily at bedtime as needed for muscle spasms, Disp: 30 tablet, Rfl: 0    metoclopramide (REGLAN) 10 mg tablet, 1 tab as needed at onset of migraine, can repeat in 8 hours, can combine with triptan/NSAID, Disp: 20 tablet, Rfl: 3    sertraline (ZOLOFT) 100 mg tablet, Take 2 tablets (200 mg total) by mouth daily, Disp: 180 tablet, Rfl: 0    valACYclovir (VALTREX) 500 mg tablet, Take 1 tablet (500 mg total) by mouth daily, Disp: 90 tablet, Rfl: 0    ZOLMitriptan (ZOMIG) 5 MG tablet, 1/2-1 tab at onset of migraine, can repeat in 2 hours, max 10mg/24 hours, Disp: 12 tablet, Rfl: 3    zolpidem (AMBIEN) 5 mg tablet, Take 1 tablet (5 mg total) by mouth as needed for sleep, Disp: 30 tablet, Rfl: 0    Allergies   Allergen Reactions    Clarithromycin Hives    Tetracyclines & Related Hives       Social History   History reviewed  No pertinent surgical history  Family History   Problem Relation Age of Onset    Hypertension Mother     Atrial fibrillation Mother    David Christianson Stroke Mother     Depression Mother     Hypertension Father     Stroke Maternal Grandmother     Hypertension Maternal Grandmother     Atrial fibrillation Maternal Grandmother     Stroke Maternal Grandfather     Hypertension Maternal Grandfather     Hypertension Paternal Grandfather        Objective:  /60 (BP Location: Left arm, Patient Position: Sitting, Cuff Size: Large)   Pulse 89   Temp 98 6 °F (37 °C) (Temporal)   Ht 5' 7" (1 702 m)   Wt 106 kg (234 lb)   SpO2 98% Comment: ra  BMI 36 65 kg/m²      Physical Exam  Vitals reviewed  Constitutional:       General: She is not in acute distress  Appearance: She is well-developed  She is not diaphoretic  HENT:      Head: Normocephalic and atraumatic  Right Ear: Tympanic membrane and external ear normal       Left Ear: Tympanic membrane and external ear normal       Nose: Nose normal       Mouth/Throat:      Pharynx: No oropharyngeal exudate or posterior oropharyngeal erythema  Eyes:      Conjunctiva/sclera: Conjunctivae normal       Pupils: Pupils are equal, round, and reactive to light  Cardiovascular:      Rate and Rhythm: Normal rate and regular rhythm  Heart sounds: Normal heart sounds  No murmur heard  Pulmonary:      Effort: Pulmonary effort is normal  No respiratory distress  Breath sounds: Normal breath sounds  No decreased breath sounds, wheezing or rhonchi  Musculoskeletal:      Cervical back: Normal range of motion and neck supple     Lymphadenopathy:      Cervical: No cervical adenopathy  Skin:     General: Skin is warm and dry  Neurological:      Mental Status: She is alert and oriented to person, place, and time     Psychiatric:         Behavior: Behavior normal

## 2022-03-17 NOTE — PATIENT INSTRUCTIONS

## 2022-03-31 ENCOUNTER — TELEPHONE (OUTPATIENT)
Dept: PSYCHIATRY | Facility: CLINIC | Age: 32
End: 2022-03-31

## 2022-05-03 ENCOUNTER — CONSULT (OUTPATIENT)
Dept: BARIATRICS | Facility: CLINIC | Age: 32
End: 2022-05-03
Payer: COMMERCIAL

## 2022-05-03 VITALS
DIASTOLIC BLOOD PRESSURE: 78 MMHG | HEART RATE: 107 BPM | SYSTOLIC BLOOD PRESSURE: 121 MMHG | WEIGHT: 229.9 LBS | BODY MASS INDEX: 36.95 KG/M2 | HEIGHT: 66 IN | TEMPERATURE: 97.8 F

## 2022-05-03 DIAGNOSIS — Z91.89 RISK FACTORS FOR OBSTRUCTIVE SLEEP APNEA: ICD-10-CM

## 2022-05-03 DIAGNOSIS — F33.1 MODERATE EPISODE OF RECURRENT MAJOR DEPRESSIVE DISORDER (HCC): ICD-10-CM

## 2022-05-03 DIAGNOSIS — E66.01 CLASS 2 SEVERE OBESITY DUE TO EXCESS CALORIES WITH SERIOUS COMORBIDITY AND BODY MASS INDEX (BMI) OF 36.0 TO 36.9 IN ADULT (HCC): Primary | ICD-10-CM

## 2022-05-03 DIAGNOSIS — I10 ESSENTIAL HYPERTENSION: ICD-10-CM

## 2022-05-03 PROBLEM — E66.812 CLASS 2 SEVERE OBESITY DUE TO EXCESS CALORIES WITH SERIOUS COMORBIDITY AND BODY MASS INDEX (BMI) OF 36.0 TO 36.9 IN ADULT (HCC): Status: ACTIVE | Noted: 2022-05-03

## 2022-05-03 PROCEDURE — 99244 OFF/OP CNSLTJ NEW/EST MOD 40: CPT | Performed by: PHYSICIAN ASSISTANT

## 2022-05-03 NOTE — ASSESSMENT & PLAN NOTE
Controlled with buspar, wellbutrin and zoloft  Follows with therapist regularly    Denies any recent hospitalizations

## 2022-05-03 NOTE — PROGRESS NOTES
Assessment/Plan:    Essential hypertension  On lisinopril 10mg  -should improve with weight loss, dietary, and lifestyle changes          Class 2 severe obesity due to excess calories with serious comorbidity and body mass index (BMI) of 36 0 to 36 9 in adult [facility])  -Discussed options of HealthyCORE-Intensive Lifestyle Intervention Program, Very Low Calorie Diet-VLCD, Conservative Program, Matthew-En-Y Gastric Bypass and Vertical Sleeve Gastrectomy and the role of weight loss medications   -Initial weight loss goal of 5-10% weight loss for improved health  - Labs reviewed from 21 all within acceptable limits  - STOP BANG-    -Patient is interested in pursuing             Follow up in approximately {FOLLOW UP:21343} with {WM Follow Up:33910}  Goals:  Food log (ie ) www myfitnesspal com,sparkpeople  com,loseit com,calorieking  com,etc  baritastic  No sugary beverages  At least 64oz of water daily  Increase physical activity by 10 minutes daily  Gradually increase physical activity to a goal of 5 days per week for 30 minutes of MODERATE intensity PLUS 2 days per week of FULL BODY resistance training  {GOALS:14922}  {GOALS:21968}  {GOALS:14002}  {RHTJ}    {Assess/PlanSmartLinks:87709}      Subjective:   Chief Complaint   Patient presents with    Consult     MWM goal wt 150, s/b -       Patient ID: Philip Resendez  is a 36 y o  female with excess weight/obesity here to pursue weight management      Past Medical History:   Diagnosis Date    Anxiety     Asthma     Depression     FHx: migraine headaches     High blood pressure     Overdose of drug/medicinal substance, undetermined intent, initial encounter 2021       HPI:  Obesity/Excess Weight:  Severity: {OBESITY (Optional):82798}  Onset:  ***    Modifiers: {MODIFIERS (Optional):95309}  Contributing factors: {CONTRIBUTING FACTORS (Optional):48190}  Associated symptoms: {ASSOCIATED SYMPTOMS (Optional):72849}    Goals:  Hydration:  Alcohol: Exercise:  Occupation:    Colonoscopy-{yes/no/not applicable:24736}    {Common ambulatory SmartLinks:56242}    Review of Systems   Constitutional: Negative for chills and fever  HENT: Negative for sore throat  Respiratory: Negative for shortness of breath  Cardiovascular: Negative for chest pain and palpitations  Gastrointestinal: Negative for abdominal pain, constipation, diarrhea and vomiting         ***GERD-***controlled   Genitourinary: Negative for difficulty urinating  Musculoskeletal: Negative for arthralgias and back pain  Skin: Negative for rash  Neurological: Negative for headaches  Psychiatric/Behavioral: Negative for dysphoric mood  The patient is not nervous/anxious  Objective:    /78 (BP Location: Left arm, Patient Position: Sitting, Cuff Size: Standard)   Pulse (!) 107   Temp 97 8 °F (36 6 °C) (Tympanic)   Ht 5' 6 4" (1 687 m)   Wt 104 kg (229 lb 14 4 oz)   BMI 36 66 kg/m²     Physical Exam  Vitals and nursing note reviewed  Constitutional:       General: She is not in acute distress  Appearance: She is well-developed  She is obese  HENT:      Head: Normocephalic and atraumatic  Eyes:      Conjunctiva/sclera: Conjunctivae normal    Neck:      Thyroid: No thyromegaly  Pulmonary:      Effort: Pulmonary effort is normal  No respiratory distress  Skin:     Findings: No rash (visible)  Neurological:      Mental Status: She is alert and oriented to person, place, and time     Psychiatric:         Behavior: Behavior normal

## 2022-05-03 NOTE — ASSESSMENT & PLAN NOTE
-Discussed options of HealthyCORE-Intensive Lifestyle Intervention Program, Very Low Calorie Diet-VLCD, Conservative Program, Matthew-En-Y Gastric Bypass and Vertical Sleeve Gastrectomy and the role of weight loss medications   -Initial weight loss goal of 5-10% weight loss for improved health  - Labs reviewed from 9/1/21 all within acceptable limits  - STOP BANG-5/8-referred to sleep medicine    -Patient is interested in pursuing surgical options  She has tried multiple diet plans in the past and would like to pursue other options  -also discussed option of medical weight management and use of saxenda

## 2022-05-03 NOTE — PROGRESS NOTES
Assessment/Plan:    Essential hypertension  On lisinopril 10mg  -should improve with weight loss, dietary, and lifestyle changes          Class 2 severe obesity due to excess calories with serious comorbidity and body mass index (BMI) of 36 0 to 36 9 in [facility])  -Discussed options of HealthyCORE-Intensive Lifestyle Intervention Program, Very Low Calorie Diet-VLCD, Conservative Program, Matthew-En-Y Gastric Bypass and Vertical Sleeve Gastrectomy and the role of weight loss medications   -Initial weight loss goal of 5-10% weight loss for improved health  - Labs reviewed from 21 all within acceptable limits  - STOP BANG--referred to sleep medicine    -Patient is interested in pursuing surgical options  She has tried multiple diet plans in the past and would like to pursue other options  -also discussed option of medical weight management and use of saxenda  Moderate episode of recurrent major depressive disorder ([facility] [address] )  Controlled with buspar, wellbutrin and zoloft  Follows with therapist regularly  Denies any recent hospitalizations    Risk factors for obstructive sleep apnea  STOP BAN/8  - Sleep medicine referral placed             Follow up in approximately 1 month with Surgical Dietician, Surgical  and Surgical   Diagnoses and all orders for this visit:    Class 2 severe obesity due to excess calories with serious comorbidity and body mass index (BMI) of 36 0 to 36 9 in [facility])  -     Ambulatory Referral to Weight Management  -     Ambulatory Referral to Sleep Medicine; Future    Essential hypertension  -     Ambulatory Referral to Sleep Medicine; Future    Risk factors for obstructive sleep apnea  -     Ambulatory Referral to Sleep Medicine;  Future    Moderate episode of recurrent major depressive disorder ([facility] [address] )          Subjective:   Chief Complaint   Patient presents with    Consult     MWM goal wt 150, s/b 5-       Patient ID: Sara Ram is a 36 y o  female with excess weight/obesity here to pursue weight management  Past Medical History:   Diagnosis Date    Anxiety     Asthma     Depression     FHx: migraine headaches     High blood pressure     Overdose of drug/medicinal substance, undetermined intent, initial encounter 8/31/2021       HPI:    She has been trying to lose weight for last 15 years  Her weight was controlled when she did cheerleading in high school but otherwise has not been  She has had issues with yo-yo dieting  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      Obesity/Excess Weight:  Severity: class II  Onset:  15 years ago    Modifiers: Diet and Exercise and Commercial Weight Loss Programs-ie  Weight Watchers, Cyndee Henok, Nutrisystem, etc -keto, [employer], food pyramid, [employer]  Thing that helped the most was cheerleading  Contributing factors: Poor Food Choices, Stress/Emotional Eating and Insufficient time to make appropriate lifestyle changes  Associated symptoms: comorbid conditions    Goals:just to improve health  Hydration: water varies due to work, diet coke 1-2 day  Alcohol: 3-4 drinks a week  Exercise:none  Occupation:OBGYN    Colonoscopy-Not applicable    The following portions of the patient's history were reviewed and updated as appropriate: She  has a past medical history of Anxiety, Asthma, Depression, FHx: migraine headaches, High blood pressure, and Overdose of drug/medicinal substance, undetermined intent, initial encounter (8/31/2021)    She   Patient Active Problem List    Diagnosis Date Noted    Class 2 severe obesity due to excess calories with serious comorbidity and body mass index (BMI) of 36 0 to 36 9 in adult [facility]) 05/03/2022    Risk factors for obstructive sleep apnea 05/03/2022    Annual physical exam 03/17/2022    Migraine without aura and without status migrainosus, not intractable 01/12/2022    COVID-19 virus infection 11/18/2021    BMI 34 0-34 9,adult 11/18/2021  Moderate episode of recurrent major depressive disorder ([facility] [address] ) 08/31/2021    Tension headache 11/06/2020    Test anxiety 03/26/2019    Herpes labialis 08/06/2018    Migraine with aura and without status migrainosus, not intractable 08/06/2018    Anxiety 08/06/2018    Mild intermittent asthma without complication 52/13/6076    Other insomnia 08/06/2018    Essential hypertension 08/06/2018     She  has no past surgical history on file  Her family history includes Atrial fibrillation in her maternal grandmother and mother; Depression in her mother; Hypertension in her father, maternal grandfather, maternal grandmother, mother, and paternal grandfather; Stroke in her maternal grandfather, maternal grandmother, and mother  She  reports that she has never smoked  She has never used smokeless tobacco  She reports current alcohol use  She reports that she does not use drugs    Current Outpatient Medications   Medication Sig Dispense Refill    ALPRAZolam (XANAX) 0 5 mg tablet Take 1 tablet (0 5 mg total) by mouth 2 (two) times a day as needed for anxiety 30 tablet 0    buPROPion (WELLBUTRIN XL) 150 mg 24 hr tablet Take 1 tablet (150 mg total) by mouth every morning 30 tablet 0    busPIRone (BUSPAR) 7 5 mg tablet Take 1 tablet (7 5 mg total) by mouth 2 (two) times a day 180 tablet 0    Galcanezumab-gnlm 120 MG/ML SOAJ 1 subcu injection q30 days 1 mL 11    lisinopril (ZESTRIL) 10 mg tablet Take 1 tablet (10 mg total) by mouth daily 90 tablet 0    methocarbamol (ROBAXIN) 500 mg tablet Take 1 tablet (500 mg total) by mouth daily at bedtime as needed for muscle spasms 30 tablet 0    sertraline (ZOLOFT) 100 mg tablet Take 2 tablets (200 mg total) by mouth daily 180 tablet 0    valACYclovir (VALTREX) 500 mg tablet Take 1 tablet (500 mg total) by mouth daily 90 tablet 0    ZOLMitriptan (ZOMIG) 5 MG tablet 1/2-1 tab at onset of migraine, can repeat in 2 hours, max 10mg/24 hours 12 tablet 3    zolpidem (AMBIEN) 5 mg tablet Take 1 tablet (5 mg total) by mouth as needed for sleep 30 tablet 0    metoclopramide (REGLAN) 10 mg tablet 1 tab as needed at onset of migraine, can repeat in 8 hours, can combine with triptan/NSAID 20 tablet 3     No current facility-administered medications for this visit  Current Outpatient Medications on File Prior to Visit   Medication Sig    ALPRAZolam (XANAX) 0 5 mg tablet Take 1 tablet (0 5 mg total) by mouth 2 (two) times a day as needed for anxiety    buPROPion (WELLBUTRIN XL) 150 mg 24 hr tablet Take 1 tablet (150 mg total) by mouth every morning    busPIRone (BUSPAR) 7 5 mg tablet Take 1 tablet (7 5 mg total) by mouth 2 (two) times a day    Galcanezumab-gnlm 120 MG/ML SOAJ 1 subcu injection q30 days    lisinopril (ZESTRIL) 10 mg tablet Take 1 tablet (10 mg total) by mouth daily    methocarbamol (ROBAXIN) 500 mg tablet Take 1 tablet (500 mg total) by mouth daily at bedtime as needed for muscle spasms    sertraline (ZOLOFT) 100 mg tablet Take 2 tablets (200 mg total) by mouth daily    valACYclovir (VALTREX) 500 mg tablet Take 1 tablet (500 mg total) by mouth daily    ZOLMitriptan (ZOMIG) 5 MG tablet 1/2-1 tab at onset of migraine, can repeat in 2 hours, max 10mg/24 hours    zolpidem (AMBIEN) 5 mg tablet Take 1 tablet (5 mg total) by mouth as needed for sleep    metoclopramide (REGLAN) 10 mg tablet 1 tab as needed at onset of migraine, can repeat in 8 hours, can combine with triptan/NSAID     No current facility-administered medications on file prior to visit       Review of Systems   Constitutional: Positive for fatigue  Negative for chills and fever  Respiratory: Negative for shortness of breath  Cardiovascular: Negative for chest pain and palpitations  Gastrointestinal: Negative for abdominal pain, constipation, diarrhea and vomiting         - GERD     Genitourinary: Negative for difficulty urinating  Musculoskeletal: Negative for arthralgias and back pain     Skin: Negative for rash  Neurological: Positive for headaches (controlled)  Negative for dizziness  Psychiatric/Behavioral: Negative for dysphoric mood  The patient is not nervous/anxious (controlled)  Objective:    /78 (BP Location: Left arm, Patient Position: Sitting, Cuff Size: Standard)   Pulse (!) 107   Temp 97 8 °F (36 6 °C) (Tympanic)   Ht 5' 6 4" (1 687 m)   Wt 104 kg (229 lb 14 4 oz)   BMI 36 66 kg/m²     Physical Exam  Vitals and nursing note reviewed  Constitutional:       General: She is not in acute distress  Appearance: She is well-developed  She is obese  HENT:      Head: Normocephalic and atraumatic  Eyes:      Conjunctiva/sclera: Conjunctivae normal    Neck:      Thyroid: No thyromegaly  Pulmonary:      Effort: Pulmonary effort is normal  No respiratory distress  Skin:     Findings: No rash (visible)  Neurological:      Mental Status: She is alert and oriented to person, place, and time     Psychiatric:         Mood and Affect: Mood normal          Behavior: Behavior normal

## 2022-05-05 DIAGNOSIS — F33.1 MODERATE EPISODE OF RECURRENT MAJOR DEPRESSIVE DISORDER (HCC): ICD-10-CM

## 2022-05-05 RX ORDER — BUPROPION HYDROCHLORIDE 150 MG/1
150 TABLET ORAL EVERY MORNING
Qty: 90 TABLET | Refills: 0 | Status: SHIPPED | OUTPATIENT
Start: 2022-05-05

## 2022-05-10 ENCOUNTER — TELEPHONE (OUTPATIENT)
Dept: PSYCHIATRY | Facility: CLINIC | Age: 32
End: 2022-05-10

## 2022-05-10 NOTE — TELEPHONE ENCOUNTER
was calling pt tp offer therapy anywhere services   Pt stated that she found services elsewhere and is no longer interested

## 2022-06-01 ENCOUNTER — PATIENT MESSAGE (OUTPATIENT)
Dept: INTERNAL MEDICINE CLINIC | Facility: OTHER | Age: 32
End: 2022-06-01

## 2022-06-01 DIAGNOSIS — F41.9 ANXIETY: ICD-10-CM

## 2022-06-03 RX ORDER — ALPRAZOLAM 0.5 MG/1
0.5 TABLET ORAL 2 TIMES DAILY PRN
Qty: 30 TABLET | Refills: 0 | Status: SHIPPED | OUTPATIENT
Start: 2022-06-03 | End: 2022-07-13 | Stop reason: SDUPTHER

## 2022-06-03 RX ORDER — SERTRALINE HYDROCHLORIDE 100 MG/1
200 TABLET, FILM COATED ORAL DAILY
Qty: 180 TABLET | Refills: 0 | Status: SHIPPED | OUTPATIENT
Start: 2022-06-03

## 2022-06-03 NOTE — TELEPHONE ENCOUNTER
From: Renetta Holliday  To: ELAINE Barrera  Sent: 6/1/2022 8:41 PM EDT  Subject: Prescription Refills    Hi Rylie Sherman,      May I have a refill for my Wellbutrin XL 150mg? I noticed that it is my only prescription that is not a 90 day prescription and I was hoping that I could have a 90 day supply rather than 30 day  Also, may I have a refill on my Xanax and Zoloft (90 days supply)? I have an appointment for follow up with you on 7/13/22        Thanks,     Blair Raines
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
## 2022-06-13 ENCOUNTER — OFFICE VISIT (OUTPATIENT)
Dept: BARIATRICS | Facility: CLINIC | Age: 32
End: 2022-06-13

## 2022-06-13 VITALS
HEART RATE: 72 BPM | TEMPERATURE: 97.8 F | DIASTOLIC BLOOD PRESSURE: 70 MMHG | BODY MASS INDEX: 36.26 KG/M2 | WEIGHT: 231 LBS | HEIGHT: 67 IN | SYSTOLIC BLOOD PRESSURE: 118 MMHG

## 2022-06-13 DIAGNOSIS — I10 ESSENTIAL HYPERTENSION: ICD-10-CM

## 2022-06-13 DIAGNOSIS — Z01.818 PREOPERATIVE CLEARANCE: ICD-10-CM

## 2022-06-13 DIAGNOSIS — Z01.812 BLOOD TESTS PRIOR TO TREATMENT OR PROCEDURE: ICD-10-CM

## 2022-06-13 DIAGNOSIS — E66.01 CLASS 2 SEVERE OBESITY DUE TO EXCESS CALORIES WITH SERIOUS COMORBIDITY AND BODY MASS INDEX (BMI) OF 36.0 TO 36.9 IN ADULT (HCC): ICD-10-CM

## 2022-06-13 DIAGNOSIS — Z91.89 RISK FACTORS FOR OBSTRUCTIVE SLEEP APNEA: ICD-10-CM

## 2022-06-13 PROCEDURE — RECHECK: Performed by: DIETITIAN, REGISTERED

## 2022-06-13 NOTE — PROGRESS NOTES
Bariatric Nutrition Assessment Note    Type of surgery    Preop no months required  Surgery Date: TBD- Tentative October-November 2022  Deadline month February 2023  Surgeon: Dr Sukhi Frazier  36 y o   female     Wt with BMI of 25: 157 4lbs  Pre-Op Excess Wt: 73 6lbs  /70   Pulse 72   Temp 97 8 °F (36 6 °C) (Tympanic)   Ht 5' 6 5" (1 689 m)   Wt 105 kg (231 lb)   BMI 36 73 kg/m²     [place] Equation:     Weight maintenance= 2160 kcal/day  Estimated calories for weight loss 6575-8279 kcal/day ( 1-2# per wk wt loss - sedentary )  Estimated protein needs 71 5-85 9 g/day (1 0-1 2 gms/kg IBW )   Estimated fluid needs 7572-4345 ml/day (30-35 ml/kg IBW )      Weight History   Onset of Obesity: Childhood  Family history of obesity: Yes  Wt Loss Attempts: Commercial Programs ([employer], Wendy Orr, etc )  Exercise  FAD Diets (Cabbage soup, Grapefruit, Cleanse, etc )  High Protein/Low CHO diets (Atkins, Union, etc )  Meal Replacements (Medifast, Slim Fast, etc )  Nutrition Counseling with RD  OTC meds/supplements  Self Created Diets (Portion Control, Healthy Food Choices, etc )  Patient has tried the above for 6 months or more with insufficient weight loss or weight regain, which is why patient has requested to be evaluated for weight loss surgery today  Maximum Wt Lost: In high school pt reports she went from 160lbs to 125lbs with exercise 6 hrs a day and following weight watchers  Review of History and Medications   Past Medical History:   Diagnosis Date    Anxiety     Asthma     Depression     FHx: migraine headaches     High blood pressure     Overdose of drug/medicinal substance, undetermined intent, initial encounter 8/31/2021     History reviewed  No pertinent surgical history    Social History     Socioeconomic History    Marital status:      Spouse name: None    Number of children: None    Years of education: None    Highest education level: None   Occupational History    None   Tobacco Use    Smoking status: Never Smoker    Smokeless tobacco: Never Used   Vaping Use    Vaping Use: Never used   Substance and Sexual Activity    Alcohol use: Yes     Comment: social    Drug use: No    Sexual activity: Yes   Other Topics Concern    None   Social History Narrative    None     Social Determinants of Health     Financial Resource Strain: Not on file   Food Insecurity: Not on file   Transportation Needs: Not on file   Physical Activity: Not on file   Stress: Not on file   Social Connections: Not on file   Intimate Partner Violence: Not on file   Housing Stability: Not on file       Current Outpatient Medications:     ALPRAZolam (XANAX) 0 5 mg tablet, Take 1 tablet (0 5 mg total) by mouth 2 (two) times a day as needed for anxiety, Disp: 30 tablet, Rfl: 0    buPROPion (WELLBUTRIN XL) 150 mg 24 hr tablet, Take 1 tablet (150 mg total) by mouth every morning, Disp: 90 tablet, Rfl: 0    busPIRone (BUSPAR) 7 5 mg tablet, Take 1 tablet (7 5 mg total) by mouth 2 (two) times a day, Disp: 180 tablet, Rfl: 0    Galcanezumab-gnlm 120 MG/ML SOAJ, 1 subcu injection q30 days, Disp: 1 mL, Rfl: 11    lisinopril (ZESTRIL) 10 mg tablet, Take 1 tablet (10 mg total) by mouth daily, Disp: 90 tablet, Rfl: 0    methocarbamol (ROBAXIN) 500 mg tablet, Take 1 tablet (500 mg total) by mouth daily at bedtime as needed for muscle spasms, Disp: 30 tablet, Rfl: 0    metoclopramide (REGLAN) 10 mg tablet, 1 tab as needed at onset of migraine, can repeat in 8 hours, can combine with triptan/NSAID, Disp: 20 tablet, Rfl: 3    sertraline (ZOLOFT) 100 mg tablet, Take 2 tablets (200 mg total) by mouth daily, Disp: 180 tablet, Rfl: 0    ZOLMitriptan (ZOMIG) 5 MG tablet, 1/2-1 tab at onset of migraine, can repeat in 2 hours, max 10mg/24 hours, Disp: 12 tablet, Rfl: 3    zolpidem (AMBIEN) 5 mg tablet, Take 1 tablet (5 mg total) by mouth as needed for sleep, Disp: 30 tablet, Rfl: 0    valACYclovir (VALTREX) 500 mg tablet, Take 1 tablet (500 mg total) by mouth daily, Disp: 90 tablet, Rfl: 0     Food Intake and Lifestyle Assessment   Food Intake Assessment completed via usual diet recall  Breakfast: on office days: diet pepsi or isogenicshake  Days at hospital: cafeteria: eggs with cheese, contreras, tater tots or hash browns  Snack: none   Lunch: at hospital: hot entree, vegetable, side  Usually drinks water  Snack: none  Dinner: if at hospital, same as lunch: If at home:  Cooks: (4 nights/week): Whole wheat spaghetti with vodka sauce with ground beef, creamed spinach, small glass red wine  Snack: none  Beverage intake: water and diet soda  Protein supplement: isogenic shake sporadically  Estimated protein intake per day: 40-60g  Estimated fluid intake per day: office days: 2+liters, if in OR: maybe 16oz  Meals eaten away from home: many meals at hospital while working  Typical meal pattern: 2-3 meals per day and 0 snacks per day  Eating Behaviors: Consumption of high calorie/ high fat foods and Large portion sizes  Food allergies or intolerances: Mushroom allergy:  Causes vomiting  Allergies   Allergen Reactions    Clarithromycin Hives    Tetracyclines & Related Hives     Cultural or Moravian considerations: none    Physical Assessment  Physical Activity  Types of exercise: hospital days much more active than office days:  Pt has step tracker watch: 8,000 steps per day  Occasional walk dog, bike ride, pickleball  Current physical limitations: exercise induced asthma  Psychosocial Assessment   Support systems: lives with significant other  He does grocery shopping, she cooks    Coworkers are very supportive, best friend, mom also very supportive  Socioeconomic factors: pt is [employer] [employer]    Nutrition Diagnosis  Diagnosis: Overweight / Obesity (NC-3 3) and Excessive energy intake (NI-1 5)  Related to: Physical inactivity and Excessive energy intake  As Evidenced by: BMI >25, Excessive energy intake and Unintentional weight gain     Nutrition Prescription: Recommend the following diet  Regular    Interventions and Teaching   Discussed pre-op and post-op nutrition guidelines  Patient educated and handouts provided  Surgical changes to stomach / GI  Capacity of post-surgery stomach  Diet progression  Adequate hydration  Sugar and fat restriction to decrease "dumping syndrome"  Fat restriction to decrease steatorrhea  Expected weight loss  Weight loss plateaus/ possibility of weight regain  Exercise  Suggestions for pre-op diet  Nutrition considerations after surgery  Protein supplements  Meal planning and preparation  Appropriate carbohydrate, protein, and fat intake, and food/fluid choices to maximize safe weight loss, nutrient intake, and tolerance   Dietary and lifestyle changes  Possible problems with poor eating habits  Techniques for self monitoring and keeping daily food journal  Potential for food intolerance after surgery, and ways to deal with them including: lactose intolerance, nausea, reflux, vomiting, diarrhea, food intolerance, appetite changes, gas  Vitamin / Mineral supplementation of Multivitamin with minerals and Vitamin D  Post-operative pregnancy guidelines  No current vitamins/supplements    Patient is not currently pregnant and doesn't desire to become pregnant a minimum of one year post-op    Education provided to: patient  Barriers to learning: No barriers identified  Readiness to change: action  Prior research on procedure: internet, discussed with provider, pre-op class and friends or family  Comprehension: verbalizes understanding   Expected Compliance: excellent    Recommendations  Pt is an appropriate candidate for surgery  Yes  Pt had bloodwork done 9/1/2021: CBC, CMP, Lipids, TSH, HgbA1c  Pt needs updated CBC+CMP, will need updated Lipids+TSH after 9/1/22  Pt also requested to check HgbA1c    Order for all of the above placed today     Minimum qualifying BMI of 34=634 17lbs  Instructed to to maintain current weight until surgery, do not gain weight, do not lose weight below 221lbs, will not do two-week pre-op liquid diet      Evaluation / Monitoring  Dietitian to Monitor: Eating pattern as discussed Tolerance of nutrition prescription Body weight Lab values Physical activity Bowel pattern    Goals  Food journal, Exercise 30 minutes 5 times per week, Complete lession plans 1-6 and Eat 3 meals per day    Time Spent:   1 Hour

## 2022-06-21 DIAGNOSIS — I10 ESSENTIAL HYPERTENSION: ICD-10-CM

## 2022-06-21 DIAGNOSIS — J45.20 MILD INTERMITTENT ASTHMA WITHOUT COMPLICATION: Primary | ICD-10-CM

## 2022-06-21 DIAGNOSIS — B00.1 HERPES LABIALIS: ICD-10-CM

## 2022-06-21 RX ORDER — LISINOPRIL 10 MG/1
10 TABLET ORAL DAILY
Qty: 90 TABLET | Refills: 0 | Status: SHIPPED | OUTPATIENT
Start: 2022-06-21

## 2022-06-21 RX ORDER — ALBUTEROL SULFATE 90 UG/1
2 AEROSOL, METERED RESPIRATORY (INHALATION) EVERY 6 HOURS PRN
Qty: 18 G | Refills: 1 | Status: SHIPPED | OUTPATIENT
Start: 2022-06-21

## 2022-06-21 RX ORDER — VALACYCLOVIR HYDROCHLORIDE 500 MG/1
500 TABLET, FILM COATED ORAL DAILY
Qty: 90 TABLET | Refills: 0 | Status: SHIPPED | OUTPATIENT
Start: 2022-06-21 | End: 2022-09-19

## 2022-06-27 ENCOUNTER — TELEPHONE (OUTPATIENT)
Dept: PULMONOLOGY | Facility: CLINIC | Age: 32
End: 2022-06-27

## 2022-06-27 NOTE — TELEPHONE ENCOUNTER
I called and Left [facility] a message regarding her appointment with Dr Jenn Ogden on 06/28/22    Please transfer call to me when she calls back  Thank you

## 2022-06-28 ENCOUNTER — TELEMEDICINE (OUTPATIENT)
Dept: PULMONOLOGY | Facility: HOSPITAL | Age: 32
End: 2022-06-28
Payer: COMMERCIAL

## 2022-06-28 VITALS — BODY MASS INDEX: 37.12 KG/M2 | HEIGHT: 66 IN | WEIGHT: 231 LBS

## 2022-06-28 DIAGNOSIS — J45.20 MILD INTERMITTENT ASTHMA WITHOUT COMPLICATION: ICD-10-CM

## 2022-06-28 DIAGNOSIS — G47.09 OTHER INSOMNIA: ICD-10-CM

## 2022-06-28 DIAGNOSIS — Z91.89 RISK FACTORS FOR OBSTRUCTIVE SLEEP APNEA: ICD-10-CM

## 2022-06-28 DIAGNOSIS — I10 ESSENTIAL HYPERTENSION: ICD-10-CM

## 2022-06-28 DIAGNOSIS — G47.33 OSA (OBSTRUCTIVE SLEEP APNEA): Primary | ICD-10-CM

## 2022-06-28 DIAGNOSIS — E66.01 CLASS 2 SEVERE OBESITY DUE TO EXCESS CALORIES WITH SERIOUS COMORBIDITY AND BODY MASS INDEX (BMI) OF 36.0 TO 36.9 IN ADULT (HCC): ICD-10-CM

## 2022-06-28 PROCEDURE — 99244 OFF/OP CNSLTJ NEW/EST MOD 40: CPT | Performed by: INTERNAL MEDICINE

## 2022-06-28 NOTE — PROGRESS NOTES
Virtual Regular Visit    Verification of patient location:    Patient is located in the following state in which I hold an active license [place]      Assessment/Plan:    Problem List Items Addressed This Visit        Respiratory    Mild intermittent asthma without complication     Only exercise-induced and using albuterol as needed with exercise             CHALO (obstructive sleep apnea) - Primary    Relevant Orders    Home Study       Cardiovascular and Mediastinum    Essential hypertension     Continues on lisinopril, rule out obstructive sleep apnea           Relevant Orders    Home Study       Other    Other insomnia     Ambien 5 mg very rarely used as needed           Class 2 severe obesity due to excess calories with serious comorbidity and body mass index (BMI) of 36 0 to 36 9 in [facility])     She is undergoing workup for gastric sleeve surgery, tentatively scheduled for the fall pending all the workup           Relevant Orders    Home Study    Risk factors for obstructive sleep apnea     · She has high pretest probability for obstructive sleep apnea given the history of snoring, high blood pressure, excessive daytime sleepiness and chronic headache  · Ordered a home study  · We discussed in length the nature of obstructive sleep apnea, relationship between obstructive sleep apnea and perioperative risk and the possible need for CPAP therapy for perioperative clearance           Relevant Orders    Home Study               Reason for visit is   Chief Complaint   Patient presents with    Virtual Regular Visit        Encounter provider Dat Chirinos MD    Provider located at [address] [id number]      Recent Visits  No visits were found meeting these conditions    Showing recent visits within past 7 days and meeting all other requirements  Today's Visits  Date Type Provider Dept   06/28/22 Telemedicine [facility] Dinorah Lewis MD [facility]   Showing today's visits and meeting all other requirements  Future Appointments  No visits were found meeting these conditions  Showing future appointments within next 150 days and meeting all other requirements       The patient was identified by name and date of birth  Philip Resendez was informed that this is a telemedicine visit and that the visit is being conducted through [facility] due to non functional [place] and patient was informed that this is not a secure, HIPAA-compliant platform  She agrees to proceed     My office door was closed  No one else was in the room  She acknowledged consent and understanding of privacy and security of the video platform  The patient has agreed to participate and understands they can discontinue the visit at any time  Patient is aware this is a billable service  Subjective  Philip Resendez is a 36 y o  female is having a visual sleep consultation today for preoperative clearance for possible sleep apnea  She works as an OBGYN physician with fluctuating sleep schedule however overall she has complains about chronic snoring, and witnessed abnormal breathing while asleep, she wakes up tired and feels that she could snooze for another hour and she feels tired during the day to the point that she would take a nap if she is given an opportunity any time  She is undergoing a workup for bariatric surgery evaluation possible gastric sleeve tentatively scheduled for the fall  Past Medical History:   Diagnosis Date    Anxiety     Asthma     Depression     FHx: migraine headaches     High blood pressure     Overdose of drug/medicinal substance, undetermined intent, initial encounter 8/31/2021       History reviewed  No pertinent surgical history      Current Outpatient Medications   Medication Sig Dispense Refill    albuterol (ProAir HFA) 90 mcg/act inhaler Inhale 2 puffs every 6 (six) hours as needed for wheezing 18 g 1  ALPRAZolam (XANAX) 0 5 mg tablet Take 1 tablet (0 5 mg total) by mouth 2 (two) times a day as needed for anxiety 30 tablet 0    buPROPion (WELLBUTRIN XL) 150 mg 24 hr tablet Take 1 tablet (150 mg total) by mouth every morning 90 tablet 0    busPIRone (BUSPAR) 7 5 mg tablet Take 1 tablet (7 5 mg total) by mouth 2 (two) times a day 180 tablet 0    Galcanezumab-gnlm 120 MG/ML SOAJ 1 subcu injection q30 days 1 mL 11    lisinopril (ZESTRIL) 10 mg tablet Take 1 tablet (10 mg total) by mouth daily 90 tablet 0    methocarbamol (ROBAXIN) 500 mg tablet Take 1 tablet (500 mg total) by mouth daily at bedtime as needed for muscle spasms 30 tablet 0    metoclopramide (REGLAN) 10 mg tablet 1 tab as needed at onset of migraine, can repeat in 8 hours, can combine with triptan/NSAID 20 tablet 3    sertraline (ZOLOFT) 100 mg tablet Take 2 tablets (200 mg total) by mouth daily 180 tablet 0    valACYclovir (VALTREX) 500 mg tablet Take 1 tablet (500 mg total) by mouth daily 90 tablet 0    ZOLMitriptan (ZOMIG) 5 MG tablet 1/2-1 tab at onset of migraine, can repeat in 2 hours, max 10mg/24 hours 12 tablet 3    zolpidem (AMBIEN) 5 mg tablet Take 1 tablet (5 mg total) by mouth as needed for sleep 30 tablet 0     No current facility-administered medications for this visit  Allergies   Allergen Reactions    Clarithromycin Hives    Tetracyclines & Related Hives       Review of Systems   All other systems reviewed and are negative  Video Exam    Vitals:    06/28/22 1319   Weight: 105 kg (231 lb)   Height: 5' 6" (1 676 m)       Physical Exam  Constitutional:       General: She is not in acute distress  Appearance: Normal appearance  She is not ill-appearing, toxic-appearing or diaphoretic  HENT:      Head: Normocephalic and atraumatic  Nose: No congestion or rhinorrhea  Eyes:      General: No scleral icterus  Right eye: No discharge  Left eye: No discharge        Conjunctiva/sclera: Conjunctivae normal    Pulmonary:      Effort: Pulmonary effort is normal  No respiratory distress  Musculoskeletal:      Cervical back: Neck supple  Skin:     Coloration: Skin is not jaundiced or pale  Neurological:      General: No focal deficit present  Mental Status: She is alert and oriented to person, place, and time  Psychiatric:         Mood and Affect: Mood normal          Behavior: Behavior normal          Thought Content: Thought content normal          Judgment: Judgment normal           I spent 17 minutes with patient today in which greater than 50% of the time was spent in counseling/coordination of care regarding CHALO    VIRTUAL VISIT [address] [place] [address] verbally agrees to participate in [employer]  Pt is aware that [employer] could be limited without vital signs or the ability to perform a full hands-on physical Verner Bue understands she or the provider may request at any time to terminate the video visit and request the patient to seek care or treatment in person

## 2022-06-28 NOTE — ASSESSMENT & PLAN NOTE
She is undergoing workup for gastric sleeve surgery, tentatively scheduled for the fall pending all the workup

## 2022-06-28 NOTE — PROGRESS NOTES
Review of Systems      Genitourinary none   Cardiology none   Gastrointestinal none   Neurology frequent headaches   Constitutional fatigue   Integumentary none   Psychiatry anxiety and depression   Musculoskeletal none   Pulmonary shortness of breath with activity and snoring   ENT none   Endocrine none   Hematological none

## 2022-06-28 NOTE — PATIENT INSTRUCTIONS
Sleep Apnea   AMBULATORY CARE:   Sleep apnea  is a condition that causes you to stop breathing often during sleep  Types of sleep apnea:   Obstructive sleep apnea (CHALO)  is the most common kind  The muscles and tissues around your throat relax and block air from passing through  Obesity, use of alcohol or cigarettes, or a family history are common causes  CHALO may increase your risk for complications after surgery  Central sleep apnea (CSA)  means your brain does not send signals to the muscles that control breathing  You do not take a breath even though your airway is open  Common causes include medical conditions such as heart failure, being older than 40, or use of opioids  Complex (or mixed) sleep apnea  means you have both obstructive and central sleep apnea  Common signs and symptoms:   Loud snoring or long pauses in breathing    Feeling sleepy, slow, and tired during the day    Snorting, gasping, or choking while you sleep, and waking up suddenly because of these    Feeling irritable during the day    Dry mouth or a headache in the mornings    Heavy night sweating    A hard time thinking, remembering things, or focusing on your tasks the following day    Call your local emergency number (911 in the [address] [facility]) if:   You have chest pain or trouble breathing  Call your doctor if:   You have new or worsening signs or symptoms  You have questions or concerns about your condition or care  Treatment  depends on the kind of apnea you have  A mouth device  may be needed if you have mild sleep apnea  These are designed to keep your throat open  Ask your dentist or healthcare provider about the best mouth device for you  A machine  may be used to help you get more air during sleep  A mask may be placed over your nose and mouth, or just your nose  The mask is hooked to the machine  You will get air through the mask      A continuous positive airway pressure (CPAP) machine  is used to keep your airway open during sleep  The machine blows a gentle stream of air into the mask when you breathe  This helps keep your airway open so you can breathe more regularly  Extra oxygen may be given through the machine  A bilevel positive airway pressure (BiPAP) machine  gives air but lowers the pressure when you breathe out  An adaptive servo-ventilator (ASV)  is a machine that learns your usual breathing pattern  Then, it uses pressure to give you air and prevent stops in your breathing  Surgery  to expand your airway or remove extra tissues may be needed  Surgery is usually only considered if other treatments do not work  Manage or prevent sleep apnea:   Reach and maintain a healthy weight  Ask your healthcare provider what a healthy weight is for you  Ask him or her to help you create a safe weight loss plan if you are overweight  Even a small goal of a 10% weight loss can improve your symptoms  Do not smoke  Nicotine and other chemicals in cigarettes and cigars can cause lung damage  Ask your healthcare provider for information if you currently smoke and need help to quit  E-cigarettes or smokeless tobacco still contain nicotine  Talk to your healthcare provider before you use these products  Do not drink alcohol or take sedative medicine before you go to sleep  Alcohol and sedatives can relax the muscles and tissues around your throat  This can block the airflow to your lungs  Sleep on your side or use pillows designed to prevent sleep apnea  This prevents your tongue or other tissues from blocking your throat  You can also raise the head of your bed  Follow up with your doctor or specialist as directed: You may need to have blood tests during your follow-up visits  Work with your provider to find the right breathing support equipment and settings for you  Write down your questions so you remember to ask them during your visits    © Copyright [employer] 2022 Information is for End User's use only and may not be sold, redistributed or otherwise used for commercial purposes  All illustrations and images included in CareNotes® are the copyrighted property of A D A M , Inc  or Génesis Styles  The above information is an  only  It is not intended as medical advice for individual conditions or treatments  Talk to your doctor, nurse or pharmacist before following any medical regimen to see if it is safe and effective for you

## 2022-06-28 NOTE — ASSESSMENT & PLAN NOTE
· She has high pretest probability for obstructive sleep apnea given the history of snoring, high blood pressure, excessive daytime sleepiness and chronic headache  · Ordered a home study  · We discussed in length the nature of obstructive sleep apnea, relationship between obstructive sleep apnea and perioperative risk and the possible need for CPAP therapy for perioperative clearance

## 2022-07-05 ENCOUNTER — APPOINTMENT (OUTPATIENT)
Dept: LAB | Facility: HOSPITAL | Age: 32
End: 2022-07-05
Attending: SURGERY
Payer: COMMERCIAL

## 2022-07-05 ENCOUNTER — HOSPITAL ENCOUNTER (OUTPATIENT)
Dept: SLEEP CENTER | Facility: CLINIC | Age: 32
Discharge: HOME | End: 2022-07-05
Payer: COMMERCIAL

## 2022-07-05 DIAGNOSIS — E66.01 CLASS 2 SEVERE OBESITY DUE TO EXCESS CALORIES WITH SERIOUS COMORBIDITY AND BODY MASS INDEX (BMI) OF 36.0 TO 36.9 IN ADULT (HCC): ICD-10-CM

## 2022-07-05 DIAGNOSIS — Z91.89 RISK FACTORS FOR OBSTRUCTIVE SLEEP APNEA: ICD-10-CM

## 2022-07-05 DIAGNOSIS — I10 ESSENTIAL HYPERTENSION: ICD-10-CM

## 2022-07-05 DIAGNOSIS — G47.33 OSA (OBSTRUCTIVE SLEEP APNEA): ICD-10-CM

## 2022-07-05 DIAGNOSIS — Z01.818 PREOPERATIVE CLEARANCE: ICD-10-CM

## 2022-07-05 DIAGNOSIS — Z01.812 BLOOD TESTS PRIOR TO TREATMENT OR PROCEDURE: ICD-10-CM

## 2022-07-05 LAB
ALBUMIN SERPL BCP-MCNC: 3.9 G/DL (ref 3.5–5)
ALP SERPL-CCNC: 114 U/L (ref 46–116)
ALT SERPL W P-5'-P-CCNC: 23 U/L (ref 12–78)
ANION GAP SERPL CALCULATED.3IONS-SCNC: 5 MMOL/L (ref 4–13)
AST SERPL W P-5'-P-CCNC: 16 U/L (ref 5–45)
BILIRUB SERPL-MCNC: 1.33 MG/DL (ref 0.2–1)
BUN SERPL-MCNC: 14 MG/DL (ref 5–25)
CALCIUM SERPL-MCNC: 9 MG/DL (ref 8.3–10.1)
CHLORIDE SERPL-SCNC: 106 MMOL/L (ref 100–108)
CHOLEST SERPL-MCNC: 219 MG/DL
CO2 SERPL-SCNC: 28 MMOL/L (ref 21–32)
CREAT SERPL-MCNC: 0.7 MG/DL (ref 0.6–1.3)
ERYTHROCYTE [DISTWIDTH] IN BLOOD BY AUTOMATED COUNT: 12.4 % (ref 11.6–15.1)
GFR SERPL CREATININE-BSD FRML MDRD: 117 ML/MIN/1.73SQ M
GLUCOSE P FAST SERPL-MCNC: 86 MG/DL (ref 65–99)
HCT VFR BLD AUTO: 40.2 % (ref 34.8–46.1)
HDLC SERPL-MCNC: 62 MG/DL
HGB BLD-MCNC: 13.5 G/DL (ref 11.5–15.4)
LDLC SERPL CALC-MCNC: 145 MG/DL (ref 0–100)
MCH RBC QN AUTO: 31.6 PG (ref 26.8–34.3)
MCHC RBC AUTO-ENTMCNC: 33.6 G/DL (ref 31.4–37.4)
MCV RBC AUTO: 94 FL (ref 82–98)
NONHDLC SERPL-MCNC: 157 MG/DL
PLATELET # BLD AUTO: 322 THOUSANDS/UL (ref 149–390)
PMV BLD AUTO: 10 FL (ref 8.9–12.7)
POTASSIUM SERPL-SCNC: 4 MMOL/L (ref 3.5–5.3)
PROT SERPL-MCNC: 7.9 G/DL (ref 6.4–8.2)
RBC # BLD AUTO: 4.27 MILLION/UL (ref 3.81–5.12)
SODIUM SERPL-SCNC: 139 MMOL/L (ref 136–145)
TRIGL SERPL-MCNC: 62 MG/DL
TSH SERPL DL<=0.05 MIU/L-ACNC: 1.53 UIU/ML (ref 0.45–4.5)
WBC # BLD AUTO: 7.63 THOUSAND/UL (ref 4.31–10.16)

## 2022-07-05 PROCEDURE — 85027 COMPLETE CBC AUTOMATED: CPT

## 2022-07-05 PROCEDURE — 36415 COLL VENOUS BLD VENIPUNCTURE: CPT

## 2022-07-05 PROCEDURE — 84443 ASSAY THYROID STIM HORMONE: CPT

## 2022-07-05 PROCEDURE — 80053 COMPREHEN METABOLIC PANEL: CPT

## 2022-07-05 PROCEDURE — 83036 HEMOGLOBIN GLYCOSYLATED A1C: CPT

## 2022-07-05 PROCEDURE — 80061 LIPID PANEL: CPT

## 2022-07-05 PROCEDURE — G0399 HOME SLEEP TEST/TYPE 3 PORTA: HCPCS

## 2022-07-05 NOTE — PROGRESS NOTES
Home Sleep Study Documentation    HOME STUDY DEVICE: Noxturnal yes                                           Joceline G3 no      Pre-Sleep Home Study:    Set-up and instructions performed by: FERCHO Ni    Technician performed demonstration for Patient: yes    Return demonstration performed by Patient: yes    Written instructions provided to Patient: yes    Patient signed consent form: yes        Post-Sleep Home Study:    Additional comments by Patient: none    Home Sleep Study Failed:no:    Failure reason: N/A    Reported or Detected: N/A    Scored by: THIAGO Mtz, TYRONEGT

## 2022-07-06 ENCOUNTER — TELEPHONE (OUTPATIENT)
Dept: SLEEP CENTER | Facility: CLINIC | Age: 32
End: 2022-07-06

## 2022-07-06 LAB
EST. AVERAGE GLUCOSE BLD GHB EST-MCNC: 94 MG/DL
HBA1C MFR BLD: 4.9 %

## 2022-07-06 PROCEDURE — 95806 SLEEP STUDY UNATT&RESP EFFT: CPT | Performed by: INTERNAL MEDICINE

## 2022-07-06 NOTE — TELEPHONE ENCOUNTER
Spoke with the patient advised sleep study shows no CHALO     Advised appointment for DME set up canceled

## 2022-07-13 ENCOUNTER — OFFICE VISIT (OUTPATIENT)
Dept: INTERNAL MEDICINE CLINIC | Facility: OTHER | Age: 32
End: 2022-07-13
Payer: COMMERCIAL

## 2022-07-13 VITALS
SYSTOLIC BLOOD PRESSURE: 122 MMHG | DIASTOLIC BLOOD PRESSURE: 84 MMHG | WEIGHT: 235.4 LBS | TEMPERATURE: 98.8 F | HEIGHT: 66 IN | OXYGEN SATURATION: 98 % | BODY MASS INDEX: 37.83 KG/M2 | RESPIRATION RATE: 18 BRPM | HEART RATE: 73 BPM

## 2022-07-13 DIAGNOSIS — G43.109 MIGRAINE WITH AURA AND WITHOUT STATUS MIGRAINOSUS, NOT INTRACTABLE: ICD-10-CM

## 2022-07-13 DIAGNOSIS — I10 ESSENTIAL HYPERTENSION: ICD-10-CM

## 2022-07-13 DIAGNOSIS — F41.9 ANXIETY: Primary | ICD-10-CM

## 2022-07-13 DIAGNOSIS — B00.1 HERPES LABIALIS: ICD-10-CM

## 2022-07-13 DIAGNOSIS — G47.09 OTHER INSOMNIA: ICD-10-CM

## 2022-07-13 DIAGNOSIS — E66.01 CLASS 2 SEVERE OBESITY DUE TO EXCESS CALORIES WITH SERIOUS COMORBIDITY AND BODY MASS INDEX (BMI) OF 36.0 TO 36.9 IN ADULT (HCC): ICD-10-CM

## 2022-07-13 DIAGNOSIS — F33.1 MODERATE EPISODE OF RECURRENT MAJOR DEPRESSIVE DISORDER (HCC): ICD-10-CM

## 2022-07-13 PROBLEM — G47.33 OSA (OBSTRUCTIVE SLEEP APNEA): Status: RESOLVED | Noted: 2022-06-28 | Resolved: 2022-07-13

## 2022-07-13 PROCEDURE — 99214 OFFICE O/P EST MOD 30 MIN: CPT | Performed by: NURSE PRACTITIONER

## 2022-07-13 RX ORDER — ALPRAZOLAM 0.5 MG/1
0.5 TABLET ORAL 2 TIMES DAILY PRN
Qty: 30 TABLET | Refills: 0 | Status: SHIPPED | OUTPATIENT
Start: 2022-07-13

## 2022-07-13 NOTE — PROGRESS NOTES
Assessment/Plan:    Problem List Items Addressed This Visit        Digestive    Herpes labialis     - Valtrex 500 mg daily              Cardiovascular and Mediastinum    Migraine with aura and without status migrainosus, not intractable     - followed by neurology  - well controlled on Emgality           Essential hypertension     - controlled on lisinopril 10 mg daily              Other    Anxiety - Primary     - controlled on current regimen of sertraline 200 mg daily, BuSpar 7 5 mg b i d  And Xanax p r n  Relevant Medications    ALPRAZolam (XANAX) 0 5 mg tablet    Other insomnia     - controlled with Ambien 5 mg p r n  Moderate episode of recurrent major depressive disorder (HCC)     - controlled on current regimen of sertraline 200 mg daily, BuSpar 7 5 mg b i d  And Wellbutrin  mg daily           Relevant Medications    ALPRAZolam (XANAX) 0 5 mg tablet    Class 2 severe obesity due to excess calories with serious comorbidity and body mass index (BMI) of 36 0 to 36 9 in [facility])     - following with bariatric surgery and planing for gastric sleeve                 BMI Counseling: Body mass index is 37 99 kg/m²  Discussed the patient's BMI with her  The BMI is above normal  Nutrition recommendations include 3-5 servings of fruits/vegetables daily, moderation in carbohydrate intake and increasing intake of lean protein  Exercise recommendations include moderate aerobic physical activity for 150 minutes/week  she is following with weight management and planning on gastric sleeve          M*Medef[employer] software was used to dictate this note  It may contain errors with dictating incorrect words or incorrect spelling  Please contact the provider directly with any questions  Subjective:      Patient ID: Emily Alba is a 36 y o  female  HPI     Patient presents today for 4 month follow up  She is planning on a gastric sleeve but is not scheduled yet   She will be seeing Dr Hafsa Perez on Aug 18th  She is anticipating surgery at the end of September  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 this month, they are moving into their new home and that 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      She follows with neurology for migraines  She is on Emgality which has been working very for her  She is has not been using her reglan or her zolmitriptan  Insomnia - she is using her [place] about twice a week  Hypertension - well controlled on lisinopril 10mg daily  Anxiety/depression - well controlled on current regimen of sertraline 200 mg daily, BuSpar 7 5 mg b i d  And Wellbutrin  mg daily    The following portions of the patient's history were reviewed and updated as appropriate: allergies, current medications, past family history, past medical history, past social history, past surgical history and problem list     Review of Systems   Constitutional: Negative for activity change, appetite change, chills, fever and unexpected weight change  Respiratory: Negative for chest tightness  Cardiovascular: Negative for chest pain  Psychiatric/Behavioral: Positive for sleep disturbance  Negative for dysphoric mood  The patient is nervous/anxious            Past Medical History:   Diagnosis Date    Anxiety     Asthma     Depression     FHx: migraine headaches     High blood pressure     Overdose of drug/medicinal substance, undetermined intent, initial encounter 8/31/2021         Current Outpatient Medications:     albuterol (ProAir HFA) 90 mcg/act inhaler, Inhale 2 puffs every 6 (six) hours as needed for wheezing, Disp: 18 g, Rfl: 1    ALPRAZolam (XANAX) 0 5 mg tablet, Take 1 tablet (0 5 mg total) by mouth 2 (two) times a day as needed for anxiety, Disp: 30 tablet, Rfl: 0    buPROPion (WELLBUTRIN XL) 150 mg 24 hr tablet, Take 1 tablet (150 mg total) by mouth every morning, Disp: 90 tablet, Rfl: 0    busPIRone (BUSPAR) 7 5 mg tablet, Take 1 tablet (7 5 mg total) by mouth 2 (two) times a day, Disp: 180 tablet, Rfl: 0    Galcanezumab-gnlm 120 MG/ML SOAJ, 1 subcu injection q30 days, Disp: 1 mL, Rfl: 11    lisinopril (ZESTRIL) 10 mg tablet, Take 1 tablet (10 mg total) by mouth daily, Disp: 90 tablet, Rfl: 0    methocarbamol (ROBAXIN) 500 mg tablet, Take 1 tablet (500 mg total) by mouth daily at bedtime as needed for muscle spasms, Disp: 30 tablet, Rfl: 0    metoclopramide (REGLAN) 10 mg tablet, 1 tab as needed at onset of migraine, can repeat in 8 hours, can combine with triptan/NSAID, Disp: 20 tablet, Rfl: 3    sertraline (ZOLOFT) 100 mg tablet, Take 2 tablets (200 mg total) by mouth daily, Disp: 180 tablet, Rfl: 0    valACYclovir (VALTREX) 500 mg tablet, Take 1 tablet (500 mg total) by mouth daily, Disp: 90 tablet, Rfl: 0    ZOLMitriptan (ZOMIG) 5 MG tablet, 1/2-1 tab at onset of migraine, can repeat in 2 hours, max 10mg/24 hours, Disp: 12 tablet, Rfl: 3    zolpidem (AMBIEN) 5 mg tablet, Take 1 tablet (5 mg total) by mouth as needed for sleep, Disp: 30 tablet, Rfl: 0    Allergies   Allergen Reactions    Clarithromycin Hives    Tetracyclines & Related Hives       Social History   History reviewed  No pertinent surgical history    Family History   Problem Relation Age of Onset    Hypertension Mother     Atrial fibrillation Mother    Elif Orozco Stroke Mother     Depression Mother     Hypertension Father     Stroke Maternal Grandmother     Hypertension Maternal Grandmother     Atrial fibrillation Maternal Grandmother     Stroke Maternal Grandfather     Hypertension Maternal Grandfather     Hypertension Paternal Grandfather     Thyroid disease Neg Hx     Diabetes Neg Hx        Objective:  /84 (BP Location: Left arm, Patient Position: Sitting, Cuff Size: Large)   Pulse 73   Temp 98 8 °F (37 1 °C) (Temporal)   Resp 18   Ht 5' 6" (1 676 m)   Wt 107 kg (235 lb 6 4 oz)   SpO2 98%   BMI 37 99 kg/m²      Physical Exam  Vitals reviewed  Constitutional:       General: She is not in acute distress  Appearance: Normal appearance  She is obese  She is not diaphoretic  HENT:      Head: Normocephalic and atraumatic  Right Ear: Tympanic membrane and external ear normal       Left Ear: Tympanic membrane and external ear normal       Mouth/Throat:      Mouth: Mucous membranes are moist       Pharynx: Oropharynx is clear  No oropharyngeal exudate or posterior oropharyngeal erythema  Eyes:      Extraocular Movements: Extraocular movements intact  Conjunctiva/sclera: Conjunctivae normal       Pupils: Pupils are equal, round, and reactive to light  Cardiovascular:      Rate and Rhythm: Normal rate and regular rhythm  Heart sounds: Normal heart sounds  No murmur heard  Pulmonary:      Effort: Pulmonary effort is normal  No respiratory distress  Breath sounds: Normal breath sounds  No wheezing, rhonchi or rales  Neurological:      Mental Status: She is alert and oriented to person, place, and time  Mental status is at baseline     Psychiatric:         Mood and Affect: Mood normal          Behavior: Behavior normal

## 2022-07-13 NOTE — ASSESSMENT & PLAN NOTE
- controlled on current regimen of sertraline 200 mg daily, BuSpar 7 5 mg b i d   And Wellbutrin  mg daily

## 2022-07-20 ENCOUNTER — OFFICE VISIT (OUTPATIENT)
Dept: BARIATRICS | Facility: CLINIC | Age: 32
End: 2022-07-20

## 2022-07-20 VITALS — WEIGHT: 232.9 LBS | BODY MASS INDEX: 37.59 KG/M2

## 2022-07-20 DIAGNOSIS — E66.01 CLASS 2 SEVERE OBESITY DUE TO EXCESS CALORIES WITH SERIOUS COMORBIDITY AND BODY MASS INDEX (BMI) OF 36.0 TO 36.9 IN ADULT (HCC): Primary | ICD-10-CM

## 2022-07-20 PROCEDURE — RECHECK: Performed by: DIETITIAN, REGISTERED

## 2022-07-20 NOTE — PROGRESS NOTES
Bariatric Nutrition Assessment Note    Type of surgery    Preop no months required  Surgery Date: TBD- Tentative October-November 2022  Deadline month February 2023  Surgeon: Dr Ritu Concepcion  36 y o   female     Wt with BMI of 25: 157 4lbs  Pre-Op Excess Wt: 73 6lbs  Wt 106 kg (232 lb 14 4 oz)   BMI 37 59 kg/m²      Minimum qualifying BMI of 68=298 17lbs  Instructed to to maintain current weight until surgery, do not gain weight, do not lose weight below 221lbs, will not do two-week pre-op liquid diet  [facility] Equation:     Weight maintenance= 2160 kcal/day  Estimated calories for weight loss 7608-4726 kcal/day ( 1-2# per wk wt loss - sedentary )  Estimated protein needs 71 5-85 9 g/day (1 0-1 2 gms/kg IBW )   Estimated fluid needs 7935-7468 ml/day (30-35 ml/kg IBW )      Review of History and Medications   Past Medical History:   Diagnosis Date    Anxiety     Asthma     Depression     FHx: migraine headaches     High blood pressure     Overdose of drug/medicinal substance, undetermined intent, initial encounter 8/31/2021     No past surgical history on file    Social History     Socioeconomic History    Marital status:      Spouse name: Not on file    Number of children: Not on file    Years of education: Not on file    Highest education level: Not on file   Occupational History    Not on file   Tobacco Use    Smoking status: Never Smoker    Smokeless tobacco: Never Used   Vaping Use    Vaping Use: Never used   Substance and Sexual Activity    Alcohol use: Yes     Comment: social    Drug use: No    Sexual activity: Yes   Other Topics Concern    Not on file   Social History Narrative    Not on file     Social Determinants of Health     Financial Resource Strain: Not on file   Food Insecurity: Not on file   Transportation Needs: Not on file   Physical Activity: Not on file   Stress: Not on file   Social Connections: Not on file Intimate Partner Violence: Not on file   Housing Stability: Not on file       Current Outpatient Medications:     albuterol (ProAir HFA) 90 mcg/act inhaler, Inhale 2 puffs every 6 (six) hours as needed for wheezing, Disp: 18 g, Rfl: 1    ALPRAZolam (XANAX) 0 5 mg tablet, Take 1 tablet (0 5 mg total) by mouth 2 (two) times a day as needed for anxiety, Disp: 30 tablet, Rfl: 0    buPROPion (WELLBUTRIN XL) 150 mg 24 hr tablet, Take 1 tablet (150 mg total) by mouth every morning, Disp: 90 tablet, Rfl: 0    busPIRone (BUSPAR) 7 5 mg tablet, Take 1 tablet (7 5 mg total) by mouth 2 (two) times a day, Disp: 180 tablet, Rfl: 0    Galcanezumab-gnlm 120 MG/ML SOAJ, 1 subcu injection q30 days, Disp: 1 mL, Rfl: 11    lisinopril (ZESTRIL) 10 mg tablet, Take 1 tablet (10 mg total) by mouth daily, Disp: 90 tablet, Rfl: 0    methocarbamol (ROBAXIN) 500 mg tablet, Take 1 tablet (500 mg total) by mouth daily at bedtime as needed for muscle spasms, Disp: 30 tablet, Rfl: 0    metoclopramide (REGLAN) 10 mg tablet, 1 tab as needed at onset of migraine, can repeat in 8 hours, can combine with triptan/NSAID, Disp: 20 tablet, Rfl: 3    sertraline (ZOLOFT) 100 mg tablet, Take 2 tablets (200 mg total) by mouth daily, Disp: 180 tablet, Rfl: 0    valACYclovir (VALTREX) 500 mg tablet, Take 1 tablet (500 mg total) by mouth daily, Disp: 90 tablet, Rfl: 0    ZOLMitriptan (ZOMIG) 5 MG tablet, 1/2-1 tab at onset of migraine, can repeat in 2 hours, max 10mg/24 hours, Disp: 12 tablet, Rfl: 3    zolpidem (AMBIEN) 5 mg tablet, Take 1 tablet (5 mg total) by mouth as needed for sleep, Disp: 30 tablet, Rfl: 0     Food Intake and Lifestyle Assessment   Food Intake Assessment completed via usual diet recall  Breakfast: on office days: diet pepsi or isogenicshake  Days at hospital: cafeteria: eggs with cheese, [place], tater tots or hash browns  Snack: none   Lunch: at hospital: hot entree, vegetable, side  Usually drinks water    Snack: none  Dinner: if at hospital, same as lunch: If at home:  Cooks: (4 nights/week): Whole wheat spaghetti with vodka sauce with ground beef, creamed spinach, small glass red wine  Snack: none  Beverage intake: water and diet soda  Protein supplement: isogenic shake sporadically  Estimated protein intake per day: 40-60g  Estimated fluid intake per day: office days: 2+liters, if in OR: maybe 16oz  Meals eaten away from home: many meals at hospital while working  Typical meal pattern: 2-3 meals per day and 0 snacks per day  Eating Behaviors: Consumption of high calorie/ high fat foods and Large portion sizes  Pt is eating protein foods first, measuring her food portions, decreasing meal portions  Pt is practicing eating slowly, taking small bites, and chewing very well  Pt is practicing the 30/60 rule, admits struggling with not washing the food down while eating  Pt has eliminated all soda, still has occasional seltzer water  Pt will begin opening then to make them flat before drinking them  Pt is reading nutrition labels and has started food journaling on [employer], although admits struggling with consistency  Provided pt with calorie and protein goals for weight maintenance  Food allergies or intolerances: Mushroom allergy:  Causes vomiting  Allergies   Allergen Reactions    Clarithromycin Hives    Tetracyclines & Related Hives     Cultural or Church considerations: none    Physical Assessment  Physical Activity  Types of exercise: hospital days much more active than office days:  Pt has step tracker watch: 8,000 steps per day  Occasional walk dog, bike ride, pickleball  Pt has been going to the gym regularly and walking the dog more often  Current physical limitations: exercise induced asthma  Psychosocial Assessment   Support systems: lives with significant other  He does grocery shopping, she cooks  Coworkers are very supportive, best friend, mom also very supportive   Significant other has now started pre-operative program at [facility] as well  Socioeconomic factors: pt is [address] [facility]  Pt's mother recently had a stroke last week  Pt admits good deal of stress  Pt is also studying for her borads and buying and moving into a new house  Pt reports some snacking while studying due to boredom/to stay awake  Nutrition Diagnosis-continued/improving  Diagnosis: Overweight / Obesity (NC-3 3) and Excessive energy intake (NI-1 5)  Related to: Physical inactivity and Excessive energy intake  As Evidenced by: BMI >25, Excessive energy intake and Unintentional weight gain     Nutrition Prescription: Recommend the following diet  Regular    Interventions and Teaching   Discussed pre-op and post-op nutrition guidelines  Patient educated and handouts provided  Surgical changes to stomach / GI  Capacity of post-surgery stomach  Diet progression  Adequate hydration  Sugar and fat restriction to decrease "dumping syndrome"  Fat restriction to decrease steatorrhea  Expected weight loss  Weight loss plateaus/ possibility of weight regain  Exercise  Suggestions for pre-op diet  Nutrition considerations after surgery  Protein supplements  Meal planning and preparation  Appropriate carbohydrate, protein, and fat intake, and food/fluid choices to maximize safe weight loss, nutrient intake, and tolerance   Dietary and lifestyle changes  Possible problems with poor eating habits  Techniques for self monitoring and keeping daily food journal  Potential for food intolerance after surgery, and ways to deal with them including: lactose intolerance, nausea, reflux, vomiting, diarrhea, food intolerance, appetite changes, gas  Vitamin / Mineral supplementation of Multivitamin with minerals and Vitamin D  Post-operative pregnancy guidelines  No current vitamins/supplements  Provided pt with bariatric chewable supplement comp[apurva chart and samples of Bariatric Pal chewable multivitamins to try      Patient is not currently pregnant and doesn't desire to become pregnant a minimum of one year post-op    Education provided to: patient  Barriers to learning: No barriers identified  Readiness to change: action  Prior research on procedure: internet, discussed with provider, pre-op class and friends or family  Comprehension: verbalizes understanding   Expected Compliance: excellent    Evaluation / Monitoring  Dietitian to Monitor: Eating pattern as discussed Tolerance of nutrition prescription Body weight Lab values Physical activity Bowel pattern    Goals  Food journal, Exercise 30 minutes 5 times per week, Complete lession plans 1-6 and Eat 3 meals per day    Workflow: (Incomplete in bold):   Psych and/or D+A Clearance: not needed   PCP Letter: done 3/17/22   Support Group: plans on attending 8/10/22   Surgeon Appt  Scheduled for 8/18/22   EGD to be scheduled at surgeon consult   Cardiac Risk Assessment reminded pt to schedule today   Sleep Studies home sleep study 7/5/22:  No CHALO   Blood work done 7/5/22: WNL   Nicotine test nonsmoker   6 Month Pre-Operative Program: not needed   Weight Loss Minimum qualifying BMI of 75=215 17lbs  Instructed to to maintain current weight until surgery, do not gain weight, do not lose weight below 221lbs, will not do two-week pre-op liquid diet       Time Spent:   30 minutes

## 2022-07-21 ENCOUNTER — TELEPHONE (OUTPATIENT)
Dept: INTERNAL MEDICINE CLINIC | Facility: OTHER | Age: 32
End: 2022-07-21

## 2022-07-21 ENCOUNTER — OFFICE VISIT (OUTPATIENT)
Dept: INTERNAL MEDICINE CLINIC | Age: 32
End: 2022-07-21
Payer: COMMERCIAL

## 2022-07-21 VITALS
TEMPERATURE: 98.4 F | SYSTOLIC BLOOD PRESSURE: 112 MMHG | HEART RATE: 70 BPM | BODY MASS INDEX: 37.61 KG/M2 | OXYGEN SATURATION: 98 % | DIASTOLIC BLOOD PRESSURE: 76 MMHG | WEIGHT: 234 LBS | HEIGHT: 66 IN

## 2022-07-21 DIAGNOSIS — N76.1 SUBACUTE VAGINITIS: ICD-10-CM
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
## 2022-07-21 DIAGNOSIS — S61.502A OPEN WOUND OF LEFT WRIST, INITIAL ENCOUNTER: ICD-10-CM

## 2022-07-21 PROCEDURE — 90715 TDAP VACCINE 7 YRS/> IM: CPT

## 2022-07-21 PROCEDURE — 99214 OFFICE O/P EST MOD 30 MIN: CPT | Performed by: PHYSICIAN ASSISTANT

## 2022-07-21 PROCEDURE — 90471 IMMUNIZATION ADMIN: CPT

## 2022-07-21 RX ORDER — FLUCONAZOLE 150 MG/1
150 TABLET ORAL ONCE
Qty: 1 TABLET | Refills: 0 | Status: SHIPPED | OUTPATIENT
Start: 2022-07-21 | End: 2022-07-21

## 2022-07-21 RX ORDER — AMOXICILLIN AND CLAVULANATE POTASSIUM 875; 125 MG/1; MG/1
1 TABLET, FILM COATED ORAL EVERY 12 HOURS SCHEDULED
Qty: 20 TABLET | Refills: 0 | Status: SHIPPED | OUTPATIENT
Start: 2022-07-21 | End: 2022-07-31

## 2022-07-21 NOTE — PROGRESS NOTES
Assessment/Plan:         Diagnoses and all orders for this visit:    Cat bite, initial encounter  Comments:  cat is up to date with rabies vaccine  keep wound clean  tdap today  augmentin - +probiotic daily  Orders:  -     amoxicillin-clavulanate (Augmentin) 875-125 mg per tablet; Take 1 tablet by mouth every 12 (twelve) hours for 10 days  -     TDAP VACCINE GREATER THAN OR EQUAL TO 6YO IM    Subacute vaginitis  -     fluconazole (DIFLUCAN) 150 mg tablet; Take 1 tablet (150 mg total) by mouth once for 1 dose    Open wound of left wrist, initial encounter  -     TDAP VACCINE GREATER THAN OR EQUAL TO 6YO IM          Subjective:      Patient ID: Seth Crum is a 36 y o  female  37 y/o female with hx migraines, depression   Presents c/o cat bite that occurred last night  Pt states she tried to pick the cat up to put her in the carrier and she bit her  She was taking care of the cats that are at her moms house bc her mother is currently in the hospital    Pt reports her boyfriend who is a  expressed the wound and cleaned it last night  Pt denies pain or discomfort at this time but does have some mild swelling and redness around the site     Pt reports she knows the cat has been vaccinated for rabies bc her mother had trapped the cat less than a year ago and has been taking care of the cat  Her mother works with a nonprofit that traps and spay/neuters and vaccinates cats and "fosters them"    Pt has been observing the cat for over 2 months and has been healthy   She is certain it is up to date with rabies vaccine           The following portions of the patient's history were reviewed and updated as appropriate: allergies, current medications, past family history, past medical history, past social history, past surgical history and problem list     Review of Systems   Constitutional: Negative for appetite change, chills, fatigue and fever  HENT: Negative for congestion      Respiratory: Negative for cough and shortness of breath  Gastrointestinal: Negative for abdominal pain, constipation and diarrhea  Skin: Positive for wound (l wrist)  Neurological: Negative for dizziness and headaches           Past Medical History:   Diagnosis Date    Allergic     Anxiety     Asthma     Depression     FHx: migraine headaches     Headache(784 0)     High blood pressure     Hypertension     Overdose of drug/medicinal substance, undetermined intent, initial encounter 08/31/2021         Current Outpatient Medications:     albuterol (ProAir HFA) 90 mcg/act inhaler, Inhale 2 puffs every 6 (six) hours as needed for wheezing, Disp: 18 g, Rfl: 1    ALPRAZolam (XANAX) 0 5 mg tablet, Take 1 tablet (0 5 mg total) by mouth 2 (two) times a day as needed for anxiety, Disp: 30 tablet, Rfl: 0    amoxicillin-clavulanate (Augmentin) 875-125 mg per tablet, Take 1 tablet by mouth every 12 (twelve) hours for 10 days, Disp: 20 tablet, Rfl: 0    buPROPion (WELLBUTRIN XL) 150 mg 24 hr tablet, Take 1 tablet (150 mg total) by mouth every morning, Disp: 90 tablet, Rfl: 0    busPIRone (BUSPAR) 7 5 mg tablet, Take 1 tablet (7 5 mg total) by mouth 2 (two) times a day, Disp: 180 tablet, Rfl: 0    fluconazole (DIFLUCAN) 150 mg tablet, Take 1 tablet (150 mg total) by mouth once for 1 dose, Disp: 1 tablet, Rfl: 0    Galcanezumab-gnlm 120 MG/ML SOAJ, 1 subcu injection q30 days, Disp: 1 mL, Rfl: 11    lisinopril (ZESTRIL) 10 mg tablet, Take 1 tablet (10 mg total) by mouth daily, Disp: 90 tablet, Rfl: 0    methocarbamol (ROBAXIN) 500 mg tablet, Take 1 tablet (500 mg total) by mouth daily at bedtime as needed for muscle spasms, Disp: 30 tablet, Rfl: 0    metoclopramide (REGLAN) 10 mg tablet, 1 tab as needed at onset of migraine, can repeat in 8 hours, can combine with triptan/NSAID, Disp: 20 tablet, Rfl: 3    sertraline (ZOLOFT) 100 mg tablet, Take 2 tablets (200 mg total) by mouth daily, Disp: 180 tablet, Rfl: 0    valACYclovir (VALTREX) 500 mg tablet, Take 1 tablet (500 mg total) by mouth daily, Disp: 90 tablet, Rfl: 0    ZOLMitriptan (ZOMIG) 5 MG tablet, 1/2-1 tab at onset of migraine, can repeat in 2 hours, max 10mg/24 hours, Disp: 12 tablet, Rfl: 3    zolpidem (AMBIEN) 5 mg tablet, Take 1 tablet (5 mg total) by mouth as needed for sleep, Disp: 30 tablet, Rfl: 0    Allergies   Allergen Reactions    Clarithromycin Hives    Tetracyclines & Related Hives       Social History   History reviewed  No pertinent surgical history  Family History   Problem Relation Age of Onset    Hypertension Mother     Atrial fibrillation Mother     Stroke Mother     Depression Mother     Hypertension Father     Stroke Maternal Grandmother     Hypertension Maternal Grandmother     Atrial fibrillation Maternal Grandmother     Stroke Maternal Grandfather     Hypertension Maternal Grandfather     Heart disease Maternal Grandfather     Hypertension Paternal Grandfather     Thyroid disease Neg Hx     Diabetes Neg Hx        Objective:  /76 (BP Location: Left arm, Patient Position: Sitting, Cuff Size: Large)   Pulse 70   Temp 98 4 °F (36 9 °C) (Temporal)   Ht 5' 6" (1 676 m)   Wt 106 kg (234 lb)   SpO2 98% Comment: room air  BMI 37 77 kg/m²        Physical Exam  Vitals reviewed  Constitutional:       General: She is not in acute distress  HENT:      Head: Normocephalic and atraumatic  Cardiovascular:      Rate and Rhythm: Normal rate and regular rhythm  Pulmonary:      Effort: Pulmonary effort is normal  No respiratory distress  Breath sounds: No wheezing  Musculoskeletal:         General: Swelling and signs of injury (L wrist - 2 puncture sites with additional abrasions on wrist, no drainage, mild edema and eccyhmosis surrounding ) present  Right lower leg: No edema  Left lower leg: No edema  Skin:     General: Skin is warm  Findings: Bruising (L wrist at site of bite ) present     Neurological: General: No focal deficit present  Mental Status: She is alert and oriented to person, place, and time

## 2022-08-18 ENCOUNTER — OFFICE VISIT (OUTPATIENT)
Dept: BARIATRICS | Facility: CLINIC | Age: 32
End: 2022-08-18
Payer: COMMERCIAL

## 2022-08-18 VITALS
WEIGHT: 234 LBS | BODY MASS INDEX: 36.73 KG/M2 | DIASTOLIC BLOOD PRESSURE: 78 MMHG | HEART RATE: 92 BPM | HEIGHT: 67 IN | TEMPERATURE: 98.4 F | SYSTOLIC BLOOD PRESSURE: 116 MMHG

## 2022-08-18 DIAGNOSIS — E66.9 OBESITY, CLASS II, BMI 35-39.9: Primary | ICD-10-CM

## 2022-08-18 DIAGNOSIS — I10 ESSENTIAL HYPERTENSION: ICD-10-CM

## 2022-08-18 DIAGNOSIS — J45.20 MILD INTERMITTENT ASTHMA WITHOUT COMPLICATION: ICD-10-CM

## 2022-08-18 DIAGNOSIS — G43.009 MIGRAINE WITHOUT AURA AND WITHOUT STATUS MIGRAINOSUS, NOT INTRACTABLE: ICD-10-CM

## 2022-08-18 PROBLEM — E66.812 OBESITY, CLASS II, BMI 35-39.9: Status: ACTIVE | Noted: 2022-08-18

## 2022-08-18 PROCEDURE — 99244 OFF/OP CNSLTJ NEW/EST MOD 40: CPT | Performed by: SURGERY

## 2022-08-18 RX ORDER — FLUCONAZOLE 150 MG/1
TABLET ORAL
COMMUNITY
Start: 2022-07-21

## 2022-08-18 NOTE — PROGRESS NOTES
BARIATRIC INITIAL CONSULT - BARIATRIC SURGERY    Parish Cross 36 y o  female MRN: [id number]  Unit/Bed#:  Encounter: [id number]      HPI:  Parish Cross is a 36 y o  female who presents with a longstanding history of morbid obesity and inability to sustain a meaningful weight loss  Here today to discuss bariatric options  Visit type: initial visit    Symptoms: excess weight and inability to loss weight    Associated Symptoms: depressed mood and anxiety    Associated Conditions: abdominal obesity  Disease Complications: hypertension and Migraines and mild asthma  Weight Loss Interest: high  Previous Diet Trials: low calorie     Exercise Frequency:infrequency  Types of Exercise: walking        Review of Systems   All other systems reviewed and are negative  Historical Information   Past Medical History:   Diagnosis Date    Allergic     Anxiety     Asthma     Depression     FHx: migraine headaches     Headache(784 0)     High blood pressure     Hypertension     Overdose of drug/medicinal substance, undetermined intent, initial encounter 08/31/2021     History reviewed  No pertinent surgical history    Social History   Social History     Substance and Sexual Activity   Alcohol Use Yes    Comment: social     Social History     Substance and Sexual Activity   Drug Use No     Social History     Tobacco Use   Smoking Status Never Smoker   Smokeless Tobacco Never Used     Family History: non-contributory    Meds/Allergies   all medications and allergies reviewed  Allergies   Allergen Reactions    Clarithromycin Hives    Tetracyclines & Related Hives       Objective       Current Vitals:   Blood Pressure: 116/78 (08/18/22 1620)  Pulse: 92 (08/18/22 1620)  Temperature: 98 4 °F (36 9 °C) (08/18/22 1620)  Temp Source: Tympanic (08/18/22 1620)  Height: 5' 6 5" (168 9 cm) (08/18/22 1620)  Weight - Scale: 106 kg (234 lb) (08/18/22 1620)        Invasive Devices  Report    None Physical Exam  Vitals reviewed  Constitutional:       General: She is not in acute distress  Appearance: She is well-developed  She is not diaphoretic  HENT:      Head: Normocephalic and atraumatic  Right Ear: External ear normal       Left Ear: External ear normal       Nose: Nose normal    Eyes:      General: No scleral icterus  Right eye: No discharge  Left eye: No discharge  Conjunctiva/sclera: Conjunctivae normal    Neurological:      Mental Status: She is alert and oriented to person, place, and time  Psychiatric:         Behavior: Behavior normal          Thought Content: Thought content normal          Judgment: Judgment normal          Lab Results: I have personally reviewed pertinent lab results  Imaging: I have personally reviewed pertinent reports  EKG, Pathology, and Other Studies: I have personally reviewed pertinent reports  Assessment/PLAN:    36 y o  female with a long standing h/o of obesity and inability to sustain any meaningful weight loss on her own despite several attempts  She is interested in the Laparoscopic Sleeve gastrectomy  As a part of her pre op evaluation, she will be referred to a cardiologist and for a sleep evaluation and consult  She needs an EGD to evaluate the anatomy of her GI tract prior to the operation  I have spent over 30 minutes with her face to face in the office today discussing her options and details of the surgery  We have seen an animation of the surgery on the computer that illustrates how the operation is done and how the anatomy will be altered with the procedure  Over 50% of this was coordinating care  I have used the [facility] bariatric surgical risk/benefit calculator and we have discussed the results as part of the preop education  She was given the opportunity to ask questions and I have answered all of them    I have discussed and educated the patient with regards to the components of our multidisciplinary program and the importance of compliance and follow up in the post operative period  The patient was also instructed with regards to the importance of behavior modification, nutritional counseling, support meeting attendance and lifestyle changes that are important to ensure success  Although there is a great statistical chance of improvement or even resolution of most of her associated comorbidities, the results vary from patient to patient and they largely depend on her commitment and compliance  She needs to lose 5-10  lbs prior to the operation        Shekhar Xavier MD  8/18/2022  4:35 PM

## 2022-09-02 ENCOUNTER — OFFICE VISIT (OUTPATIENT)
Dept: CARDIOLOGY CLINIC | Facility: CLINIC | Age: 32
End: 2022-09-02
Payer: COMMERCIAL

## 2022-09-02 VITALS
BODY MASS INDEX: 36.98 KG/M2 | DIASTOLIC BLOOD PRESSURE: 78 MMHG | SYSTOLIC BLOOD PRESSURE: 116 MMHG | HEIGHT: 67 IN | HEART RATE: 63 BPM | WEIGHT: 235.6 LBS

## 2022-09-02 DIAGNOSIS — Z01.818 PRE-OP EVALUATION: Primary | ICD-10-CM

## 2022-09-02 DIAGNOSIS — I10 ESSENTIAL HYPERTENSION: ICD-10-CM

## 2022-09-02 PROCEDURE — 99204 OFFICE O/P NEW MOD 45 MIN: CPT | Performed by: INTERNAL MEDICINE

## 2022-09-02 PROCEDURE — 93000 ELECTROCARDIOGRAM COMPLETE: CPT | Performed by: INTERNAL MEDICINE

## 2022-09-02 NOTE — PROGRESS NOTES
[address] [facility]  Office Consultation  Parish Cross 36 y o  female MRN: [id number]        Chief Complaint    Chief Complaint   Patient presents with    New Patient Visit     Est  Care, no complaints      Pre-op Clearance     For bariatric surgery       Referring Provider: Krish Aaron MD    Impression & Plan:    1  BMI 34 0-34 9,adult  Proceed with bariatric surgery evaluation   - Ambulatory referral to Cardiology  - POCT ECG    2  Pre-op evaluation  She is low risk for cardiac complications of bariatric surgery  Her Raynell Mu perioperative risk score, for MI or sudden cardiac death, up to 30 days postoperative, is 0 0%  Her EKG is nonischemic  Her cardiovascular exam is unremarkable  She is functionally independent with no significant cardiovascular symptoms with exertion  No further cardiac testing is recommended prior to proceeding with planned bariatric surgery  - POCT ECG    3  Essential hypertension  Well controlled on lisinopril 10 mg daily  Her goal blood pressure is under 130/80 given agent low fall risk  Blood pressure may improve with weight loss, would continue to monitor and consider reduction of lisinopril dose as needed  We did discuss potential teratogenicity of lisinopril and my recommendation to switch to nifedipine or labetalol prior to any planned future conception  We will see Parish Cross back as needed  HPI: Parish Cross is a 36y o  year old female, local obstetrician/gynecologist, with controlled hypertension who presents today for preoperative risk stratification prior to bariatric surgery  She has planned bariatric surgery with Dr Stephanie Caputo MD, likely gastric sleeve  Date TBD but likely within the next 1-2 months  No cardiac symptoms exertion  No history of cardiac symptoms with exertion  Had undergone extensive workup in her adolescence for hypertension, with onset at age 14, without any secondary causes identified        Review of Systems   Constitutional: Negative for activity change and fatigue  HENT: Negative for facial swelling  Eyes: Negative for visual disturbance  Respiratory: Negative for chest tightness and shortness of breath  Cardiovascular: Negative for chest pain, palpitations and leg swelling  Gastrointestinal: Negative for nausea and vomiting  Musculoskeletal: Negative for myalgias  Skin: Negative for pallor  Allergic/Immunologic: Negative for immunocompromised state  Neurological: Negative for dizziness, syncope and light-headedness  Psychiatric/Behavioral: Negative for agitation and confusion  The patient is not nervous/anxious  Past Medical History:   Diagnosis Date    Allergic     Anxiety     Asthma     Depression     FHx: migraine headaches     Headache(784 0)     High blood pressure     Hypertension     Overdose of drug/medicinal substance, undetermined intent, initial encounter 08/31/2021     No past surgical history on file  Social History     Substance and Sexual Activity   Alcohol Use Yes    Comment: social     Social History     Substance and Sexual Activity   Drug Use No     Social History     Tobacco Use   Smoking Status Never Smoker   Smokeless Tobacco Never Used     Family History   Problem Relation Age of Onset    Hypertension Mother     Atrial fibrillation Mother    Crystal Tate Stroke Mother     Depression Mother     Hypertension Father     Stroke Maternal Grandmother     Hypertension Maternal Grandmother     Atrial fibrillation Maternal Grandmother     Stroke Maternal Grandfather     Hypertension Maternal Grandfather     Heart disease Maternal Grandfather     Hypertension Paternal Grandfather     Thyroid disease Neg Hx     Diabetes Neg Hx        Allergies: Allergies   Allergen Reactions    Clarithromycin Hives    Tetracyclines & Related Hives       Medications (as of START of this encounter):    Outpatient Medications Prior to Visit   Medication Sig Dispense Refill  albuterol (ProAir HFA) 90 mcg/act inhaler Inhale 2 puffs every 6 (six) hours as needed for wheezing 18 g 1    ALPRAZolam (XANAX) 0 5 mg tablet Take 1 tablet (0 5 mg total) by mouth 2 (two) times a day as needed for anxiety 30 tablet 0    buPROPion (WELLBUTRIN XL) 150 mg 24 hr tablet Take 1 tablet (150 mg total) by mouth every morning 90 tablet 0    busPIRone (BUSPAR) 7 5 mg tablet Take 1 tablet (7 5 mg total) by mouth 2 (two) times a day 180 tablet 0    Galcanezumab-gnlm 120 MG/ML SOAJ 1 subcu injection q30 days 1 mL 11    lisinopril (ZESTRIL) 10 mg tablet Take 1 tablet (10 mg total) by mouth daily 90 tablet 0    methocarbamol (ROBAXIN) 500 mg tablet Take 1 tablet (500 mg total) by mouth daily at bedtime as needed for muscle spasms 30 tablet 0    metoclopramide (REGLAN) 10 mg tablet 1 tab as needed at onset of migraine, can repeat in 8 hours, can combine with triptan/NSAID 20 tablet 3    sertraline (ZOLOFT) 100 mg tablet Take 2 tablets (200 mg total) by mouth daily 180 tablet 0    valACYclovir (VALTREX) 500 mg tablet Take 1 tablet (500 mg total) by mouth daily 90 tablet 0    ZOLMitriptan (ZOMIG) 5 MG tablet 1/2-1 tab at onset of migraine, can repeat in 2 hours, max 10mg/24 hours 12 tablet 3    zolpidem (AMBIEN) 5 mg tablet Take 1 tablet (5 mg total) by mouth as needed for sleep 30 tablet 0    fluconazole (DIFLUCAN) 150 mg tablet  (Patient not taking: No sig reported)       No facility-administered medications prior to visit           Vitals:    09/02/22 1359   BP: 116/78   Pulse: 63     Weight (last 2 days)     Date/Time Weight    09/02/22 1359 107 (235 6)          General: Pita Urban is an obese but otherwise well appearing female, in no acute distress, sitting comfortably  HEENT: moist mucous membranes, EOMI  Neck:  No JVD, supple, trachea midline   Cardiovascular: unremarkable S1/S2, regular rate and rhythm, no murmurs, rubs or gallops   Pulmonary: normal respiratory effort, CTAB   Abdomen: soft and nondistended  Extremities: No lower extremity edema  Warm and well perfused extremities  Neuro: no focal motor deficits, AAOx3 (person, place, time)  Psych: Normal mood and affect, cooperative      Laboratory Studies:    Laboratory studies personally reviewed  Lipid profile 07/05/2022 , TG 62, HDL 62,       Cardiac testing:     EKG reviewed personally:   EKG in the office today shows normal sinus rhythm, 62 bpm, normal axis, normal intervals  Normal study  Time Spent:  Total time (face-to-face and non-face-to-face) spent on today's visit was 25 minutes  This includes preparation for the visits (i e  reviewing test results), performance of a medically appropriate history and examination, and orders for medications, tests or other procedures  This time is exclusive of procedures performed and time spent teaching  David Obrien MD    This note was completed in part utilizing M*Modal IQ[employer] direct voice recognition software  Grammatical errors, random word insertion, spelling mistakes, occasional wrong word or "sound-alike" substitutions and incomplete sentences may be an occasional consequence of the system secondary to software limitations, ambient noise and hardware issues  At the time of dictation, efforts were made to edit, clarify and /or correct errors  Please read the chart carefully and recognize, using context, where substitutions have occurred  If you have any questions or concerns about the context, text or information contained within the body of this dictation, please contact myself, the provider, for further clarification

## 2022-09-12 DIAGNOSIS — B00.1 HERPES LABIALIS: ICD-10-CM

## 2022-09-12 DIAGNOSIS — F33.1 MODERATE EPISODE OF RECURRENT MAJOR DEPRESSIVE DISORDER (HCC): ICD-10-CM

## 2022-09-12 DIAGNOSIS — G43.109 MIGRAINE WITH AURA AND WITHOUT STATUS MIGRAINOSUS, NOT INTRACTABLE: ICD-10-CM

## 2022-09-12 DIAGNOSIS — F41.9 ANXIETY: ICD-10-CM

## 2022-09-12 DIAGNOSIS — I10 ESSENTIAL HYPERTENSION: ICD-10-CM

## 2022-09-12 RX ORDER — LISINOPRIL 10 MG/1
10 TABLET ORAL DAILY
Qty: 90 TABLET | Refills: 0 | Status: SHIPPED | OUTPATIENT
Start: 2022-09-12

## 2022-09-12 RX ORDER — BUSPIRONE HYDROCHLORIDE 7.5 MG/1
7.5 TABLET ORAL 2 TIMES DAILY
Qty: 180 TABLET | Refills: 0 | Status: SHIPPED | OUTPATIENT
Start: 2022-09-12

## 2022-09-12 RX ORDER — VALACYCLOVIR HYDROCHLORIDE 500 MG/1
500 TABLET, FILM COATED ORAL DAILY
Qty: 90 TABLET | Refills: 0 | Status: SHIPPED | OUTPATIENT
Start: 2022-09-12 | End: 2022-12-11

## 2022-09-12 RX ORDER — SERTRALINE HYDROCHLORIDE 100 MG/1
200 TABLET, FILM COATED ORAL DAILY
Qty: 180 TABLET | Refills: 0 | Status: SHIPPED | OUTPATIENT
Start: 2022-09-12

## 2022-09-12 RX ORDER — BUPROPION HYDROCHLORIDE 150 MG/1
150 TABLET ORAL EVERY MORNING
Qty: 90 TABLET | Refills: 0 | Status: SHIPPED | OUTPATIENT
Start: 2022-09-12

## 2022-09-14 DIAGNOSIS — G43.109 MIGRAINE WITH AURA AND WITHOUT STATUS MIGRAINOSUS, NOT INTRACTABLE: ICD-10-CM

## 2022-09-26 NOTE — PROGRESS NOTES
Name           Lamar Valero        Starting weight 231  Last time weight 234  Today's weight unable to do weight check today       Surgery month:  Oct/ Nov  Surgery deadline: Feb  Surgeon: Dr Parrish Backer Follow Up Note:  Amwell Visit  Preop no months required  Deadline month February 2023    Mental health and wellness - Patient shared she was unable to make today's visit because of a family emergency  The patient has recently  moved to her new home and was happy as her bedroom is far from the kitchen which has decreased a lot of her mindless eating  Her spouse is also currently going through the bariatric weight management process and they support each other with healthy eating and working on making good choices especially when food shopping  The patient continues to see her therapist weekly and has been working on body image and self awareness  Reviewed workflow  Eating behaviors - enjoyed support group learning to incorporate  healthy foods to decrease carb's  The patient is  implementing  more plant base protein ideas  Currently having three meals a day, but  struggling with 30/60 rule  Applying eating slow and  practicing putting her fork down after each bit  Stopped eating in front of TV  No cell phones when eating and this helps her be more mindful  Hydration drinking through out the day taking small sips and no more sodas  The patient explained she is struggling and would like more guidance on portion control understanding how much she needs to have on her plate  Currently using her measuring cup, but would like further education  Sw reviewed follow up with DEENA will be provided to help guide her next appointment  Activity -  Increased walking       Progress toward program requirements    Workflow:  · Psych and/or D+A Clearance: not needed  · PCP Letter: done 3/17/22  · Support Group: plans on attending 8/10/22  · Surgeon Appt  done  8/18/22  · EGD scheduled 11/16/22  · Cardiac Risk Assessment done 9 2 22  · Sleep Studies home sleep study 7/5/22:  No CHALO    · Blood work done 7/5/22: WNL  · Nicotine test nonsmoker    Reviewed and discussed  Adequate hydration  Exercise  Meal planning and preparation  Lifestyle changes  Possible problems with poor eating habits    Goal: 1-continue to increase activity level  Next appointment: 10/28/22 RD

## 2022-09-29 ENCOUNTER — PREP FOR PROCEDURE (OUTPATIENT)
Dept: BARIATRICS | Facility: CLINIC | Age: 32
End: 2022-09-29

## 2022-09-29 DIAGNOSIS — E66.9 OBESITY, CLASS II, BMI 35-39.9: Primary | ICD-10-CM

## 2022-09-30 ENCOUNTER — OFFICE VISIT (OUTPATIENT)
Dept: BARIATRICS | Facility: CLINIC | Age: 32
End: 2022-09-30

## 2022-09-30 DIAGNOSIS — E66.9 OBESITY, CLASS II, BMI 35-39.9: Primary | ICD-10-CM

## 2022-09-30 PROCEDURE — RECHECK

## 2022-10-27 NOTE — PROGRESS NOTES
Name             Caden Griffin      Starting weight 231  Last time weight 234  Today's weight 231 7      Surgery month:  Oct/Nov  Surgery deadline: Feb  Surgeon: Dr Elaine Beard Follow Up Note:    6 Month Pre-Operative Program: not needed  Deadline month February 2023  Surgeon: Dr Clement Kelly and wellness - Patient presents to the office today for weight check and support visit  She reported  for her birthday she really enjoyed herself and did some mindless eating, but she is back on track and making sure that she is meal prepping and working on eating slow  The patient reports she spoke to her   PCP and  will be working on her to switch her medication XR prior to her surgery  Pt shared hx of Mother moving to [place] and she is  working on looking for wedding dresses learning to  be kind to her body  Continues therapy with [facility]  and today is a big day for her as she receives the results of her boards  Reviewed workflow    Eating behaviors -focusing  on having all three meals, eating  less at lunch, meal prep, portion control, working on eating slow and  chewing  Hydration- drinks water throughout the day  Activity -  4x a week walks around the neighborhood with her dog, 1 2 miles around the neighborhood power walk  45 minutes     Progress toward program requirements    Workflow:  · Psych and/or D+A Clearance: not needed  · PCP Letter: done 3/17/22  · Support Group: 9/14/2022  · Surgeon Appt  8/18/22  · EGD: 10/31/22  · Cardiac Risk Assessment   9/2/22  · Sleep Studies home sleep study 7/5/22:  No CHALO  · Blood work done 7/5/22: WNL  · Nicotine test nonsmoker  · :     Reviewed and discussed  Patient educated and handouts provided    Adequate hydration  Exercise  Meal planning and preparation  Lifestyle changes  Possible problems with poor eating habits  Techniques for self monitoring and keeping daily food journal    Goal: 1-work on losing more weight for surgery continuing healthy eating  Next appointment: CALEB 11/25/22

## 2022-10-28 ENCOUNTER — OFFICE VISIT (OUTPATIENT)
Dept: BARIATRICS | Facility: CLINIC | Age: 32
End: 2022-10-28

## 2022-10-28 VITALS — BODY MASS INDEX: 36.84 KG/M2 | WEIGHT: 231.7 LBS

## 2022-10-28 DIAGNOSIS — E66.9 OBESITY, CLASS II, BMI 35-39.9: Primary | ICD-10-CM

## 2022-10-28 PROCEDURE — RECHECK

## 2022-10-31 ENCOUNTER — ANESTHESIA EVENT (OUTPATIENT)
Dept: GASTROENTEROLOGY | Facility: HOSPITAL | Age: 32
End: 2022-10-31

## 2022-10-31 ENCOUNTER — ANESTHESIA (OUTPATIENT)
Dept: GASTROENTEROLOGY | Facility: HOSPITAL | Age: 32
End: 2022-10-31

## 2022-10-31 ENCOUNTER — HOSPITAL ENCOUNTER (OUTPATIENT)
Dept: GASTROENTEROLOGY | Facility: HOSPITAL | Age: 32
Setting detail: OUTPATIENT SURGERY
Discharge: HOME | End: 2022-10-31
Attending: SURGERY

## 2022-10-31 VITALS
DIASTOLIC BLOOD PRESSURE: 75 MMHG | SYSTOLIC BLOOD PRESSURE: 114 MMHG | OXYGEN SATURATION: 99 % | HEART RATE: 64 BPM | TEMPERATURE: 97.3 F | WEIGHT: 234.57 LBS | HEIGHT: 67 IN | RESPIRATION RATE: 22 BRPM | BODY MASS INDEX: 36.82 KG/M2

## 2022-10-31 DIAGNOSIS — E66.9 OBESITY, CLASS II, BMI 35-39.9: ICD-10-CM

## 2022-10-31 LAB
EXT PREGNANCY TEST URINE: NEGATIVE
EXT. CONTROL: NORMAL

## 2022-10-31 RX ORDER — PROPOFOL 10 MG/ML
INJECTION, EMULSION INTRAVENOUS AS NEEDED
Status: DISCONTINUED | OUTPATIENT
Start: 2022-10-31 | End: 2022-10-31

## 2022-10-31 RX ORDER — SODIUM CHLORIDE, SODIUM LACTATE, POTASSIUM CHLORIDE, CALCIUM CHLORIDE 600; 310; 30; 20 MG/100ML; MG/100ML; MG/100ML; MG/100ML
INJECTION, SOLUTION INTRAVENOUS CONTINUOUS PRN
Status: DISCONTINUED | OUTPATIENT
Start: 2022-10-31 | End: 2022-10-31

## 2022-10-31 RX ORDER — LIDOCAINE HYDROCHLORIDE 20 MG/ML
INJECTION, SOLUTION EPIDURAL; INFILTRATION; INTRACAUDAL; PERINEURAL AS NEEDED
Status: DISCONTINUED | OUTPATIENT
Start: 2022-10-31 | End: 2022-10-31

## 2022-10-31 RX ORDER — MIDAZOLAM HYDROCHLORIDE 2 MG/2ML
1 INJECTION, SOLUTION INTRAMUSCULAR; INTRAVENOUS
Status: DISCONTINUED | OUTPATIENT
Start: 2022-10-31 | End: 2022-11-04 | Stop reason: HOSPADM

## 2022-10-31 RX ADMIN — MIDAZOLAM HYDROCHLORIDE 1 MG: 1 INJECTION, SOLUTION INTRAMUSCULAR; INTRAVENOUS at 12:35

## 2022-10-31 RX ADMIN — LIDOCAINE HYDROCHLORIDE 100 MG: 20 INJECTION, SOLUTION EPIDURAL; INFILTRATION; INTRACAUDAL; PERINEURAL at 11:48

## 2022-10-31 RX ADMIN — PROPOFOL 75 MG: 10 INJECTION, EMULSION INTRAVENOUS at 11:49

## 2022-10-31 RX ADMIN — SODIUM CHLORIDE, SODIUM LACTATE, POTASSIUM CHLORIDE, AND CALCIUM CHLORIDE: .6; .31; .03; .02 INJECTION, SOLUTION INTRAVENOUS at 11:42

## 2022-10-31 RX ADMIN — PROPOFOL 75 MG: 10 INJECTION, EMULSION INTRAVENOUS at 11:50

## 2022-10-31 RX ADMIN — PROPOFOL 75 MG: 10 INJECTION, EMULSION INTRAVENOUS at 11:52

## 2022-10-31 NOTE — H&P
This is a 29 y o  female with a history of morbid obesity and Body mass index is 36 74 kg/m²  Here for an EGD to evaluate the anatomy of the GI tract and to rule out the presence of H  pylori  Physical Exam  /63   Pulse 81   Temp (!) 97 3 °F (36 3 °C) (Temporal)   Resp 18   Ht 5' 7" (1 702 m)   Wt 106 kg (234 lb 9 1 oz)   SpO2 99%   BMI 36 74 kg/m²        AAOx3  RRR  CTA B  Abdomen obese  Benign  A/P:    This is a 29 y o  female with a history of morbid obesity and Body mass index is 36 74 kg/m²       Will proceed with the EGD and biopsies        Denny Ness MD  10/31/2022  11:42 AM

## 2022-10-31 NOTE — ANESTHESIA POSTPROCEDURE EVALUATION
Post-Op Assessment Note    CV Status:  Stable    Pain management: adequate     Mental Status:  Sleepy and arousable   Hydration Status:  Euvolemic   PONV Controlled:  Controlled   Airway Patency:  Patent      Post Op Vitals Reviewed: Yes            No complications documented      BP      Temp     Pulse     Resp      SpO2

## 2022-10-31 NOTE — ANESTHESIA PREPROCEDURE EVALUATION
Procedure:  EGD    Relevant Problems   CARDIO   (+) Essential hypertension   (+) Migraine with aura and without status migrainosus, not intractable   (+) Migraine without aura and without status migrainosus, not intractable      NEURO/PSYCH   (+) Anxiety   (+) Migraine with aura and without status migrainosus, not intractable   (+) Migraine without aura and without status migrainosus, not intractable   (+) Moderate episode of recurrent major depressive disorder (HCC)   (+) Tension headache   (+) Test anxiety      PULMONARY   (+) Mild intermittent asthma without complication      Other   (+) Class 2 severe obesity due to excess calories with serious comorbidity and body mass index (BMI) of 36 0 to 36 9 in adult [facility])        Physical Exam    Airway    Mallampati score: I  TM Distance: >3 FB  Neck ROM: full     Dental   No notable dental hx     Cardiovascular      Pulmonary      Other Findings        Anesthesia Plan  ASA Score- 2     Anesthesia Type- IV sedation with anesthesia with ASA Monitors  Additional Monitors:   Airway Plan:           Plan Factors-    Chart reviewed  Patient summary reviewed  Induction- intravenous  Postoperative Plan-     Informed Consent- Anesthetic plan and risks discussed with patient  I personally reviewed this patient with the CRNA  Discussed and agreed on the Anesthesia Plan with the CRNA  Perry Tovar

## 2022-11-16 ENCOUNTER — OFFICE VISIT (OUTPATIENT)
Dept: INTERNAL MEDICINE CLINIC | Facility: OTHER | Age: 32
End: 2022-11-16

## 2022-11-16 VITALS
HEART RATE: 83 BPM | OXYGEN SATURATION: 99 % | WEIGHT: 234.6 LBS | HEIGHT: 67 IN | DIASTOLIC BLOOD PRESSURE: 70 MMHG | SYSTOLIC BLOOD PRESSURE: 118 MMHG | TEMPERATURE: 98.4 F | BODY MASS INDEX: 36.82 KG/M2

## 2022-11-16 DIAGNOSIS — F41.9 ANXIETY: ICD-10-CM

## 2022-11-16 DIAGNOSIS — G47.09 OTHER INSOMNIA: ICD-10-CM

## 2022-11-16 DIAGNOSIS — B00.1 HERPES LABIALIS: ICD-10-CM

## 2022-11-16 DIAGNOSIS — E66.01 CLASS 2 SEVERE OBESITY DUE TO EXCESS CALORIES WITH SERIOUS COMORBIDITY AND BODY MASS INDEX (BMI) OF 36.0 TO 36.9 IN ADULT (HCC): ICD-10-CM

## 2022-11-16 DIAGNOSIS — I10 ESSENTIAL HYPERTENSION: Primary | ICD-10-CM

## 2022-11-16 DIAGNOSIS — F33.1 MODERATE EPISODE OF RECURRENT MAJOR DEPRESSIVE DISORDER (HCC): ICD-10-CM

## 2022-11-16 DIAGNOSIS — G44.209 TENSION HEADACHE: ICD-10-CM

## 2022-11-16 PROBLEM — Z00.00 ANNUAL PHYSICAL EXAM: Status: RESOLVED | Noted: 2022-03-17 | Resolved: 2022-11-16

## 2022-11-16 RX ORDER — BUSPIRONE HYDROCHLORIDE 7.5 MG/1
7.5 TABLET ORAL 2 TIMES DAILY
Qty: 180 TABLET | Refills: 1 | Status: SHIPPED | OUTPATIENT
Start: 2022-11-16

## 2022-11-16 RX ORDER — LISINOPRIL 10 MG/1
10 TABLET ORAL DAILY
Qty: 90 TABLET | Refills: 1 | Status: SHIPPED | OUTPATIENT
Start: 2022-11-16

## 2022-11-16 RX ORDER — ZOLPIDEM TARTRATE 5 MG/1
5 TABLET ORAL AS NEEDED
Qty: 30 TABLET | Refills: 0 | Status: SHIPPED | OUTPATIENT
Start: 2022-11-16

## 2022-11-16 RX ORDER — VALACYCLOVIR HYDROCHLORIDE 500 MG/1
500 TABLET, FILM COATED ORAL DAILY
Qty: 90 TABLET | Refills: 1 | Status: SHIPPED | OUTPATIENT
Start: 2022-11-16 | End: 2023-02-14

## 2022-11-16 RX ORDER — ALPRAZOLAM 0.5 MG/1
0.5 TABLET ORAL 2 TIMES DAILY PRN
Qty: 30 TABLET | Refills: 0 | Status: SHIPPED | OUTPATIENT
Start: 2022-11-16

## 2022-11-16 RX ORDER — METHOCARBAMOL 500 MG/1
500 TABLET, FILM COATED ORAL
Qty: 30 TABLET | Refills: 1 | Status: SHIPPED | OUTPATIENT
Start: 2022-11-16

## 2022-11-16 RX ORDER — SERTRALINE HYDROCHLORIDE 100 MG/1
200 TABLET, FILM COATED ORAL DAILY
Qty: 180 TABLET | Refills: 1 | Status: SHIPPED | OUTPATIENT
Start: 2022-11-16

## 2022-11-16 RX ORDER — BUPROPION HYDROCHLORIDE 75 MG/1
75 TABLET ORAL 2 TIMES DAILY
Qty: 180 TABLET | Refills: 1 | Status: SHIPPED | OUTPATIENT
Start: 2022-11-16

## 2022-11-16 NOTE — PROGRESS NOTES
Assessment/Plan:    Problem List Items Addressed This Visit        Digestive    Herpes labialis     - continue Valtrex 500 mg daily         Relevant Medications    valACYclovir (VALTREX) 500 mg tablet       Cardiovascular and Mediastinum    Essential hypertension - Primary     - controlled on lisinopril 10 mg daily  - discussed with patient that we will monitor this closely after her sleeve gastrectomy in January, may need to titrate down         Relevant Medications    lisinopril (ZESTRIL) 10 mg tablet       Other    Anxiety     - continue sertraline 200 mg daily, BuSpar 7 5 mg daily and Xanax 0 5 mg p r n  Relevant Medications    ALPRAZolam (XANAX) 0 5 mg tablet    busPIRone (BUSPAR) 7 5 mg tablet    sertraline (ZOLOFT) 100 mg tablet    Other insomnia     - controlled with infrequent use of Ambien         Relevant Medications    zolpidem (AMBIEN) 5 mg tablet    Tension headache    Relevant Medications    buPROPion (WELLBUTRIN) 75 mg tablet    methocarbamol (ROBAXIN) 500 mg tablet    sertraline (ZOLOFT) 100 mg tablet    Moderate episode of recurrent major depressive disorder ([address] )     - controlled on current regimen  - will adjust her Wellbutrin to immediate release due to her upcoming bariatric surgery  - continue Wellbutrin 75 mg b i d  And sertraline 200 mg daily  - continue counseling         Relevant Medications    buPROPion (WELLBUTRIN) 75 mg tablet    ALPRAZolam (XANAX) 0 5 mg tablet    busPIRone (BUSPAR) 7 5 mg tablet    sertraline (ZOLOFT) 100 mg tablet    zolpidem (AMBIEN) 5 mg tablet    Class 2 severe obesity due to excess calories with serious comorbidity and body mass index (BMI) of 36 0 to 36 9 in adult [facility])     - following with bariatric surgery, anticipating sleeve gastrectomy in January 2023               M*Modal software was used to dictate this note  It may contain errors with dictating incorrect words or incorrect spelling   Please contact the provider directly with any questions  Subjective:      Patient ID: Meeta Morris is a 29 y o  female  HPI    Patient presents today for routine six-month follow-up  No new labs for review today  She continues to follow with Dr Frank Chaudhari for a sleeve gastrectomy, anticipating surgery in January 2023  HTN - blood pressure continues to be well controlled on lisinopril 10 mg daily  She is compliant with her medication    Depression -continues to follow with her counselor every other week  Medication regimen includes Sertraline 200mg, Wellbutrin  mg daily and BuSpar 7 5 mg b i d  She does have Xanax p r n  Which she uses very sparingly  Insomnia- infrequent use of Ambien 5 mg      The following portions of the patient's history were reviewed and updated as appropriate: allergies, current medications, past family history, past medical history, past social history, past surgical history and problem list     Review of Systems   Constitutional: Negative for activity change, appetite change and unexpected weight change  Neurological: Positive for headaches (managed by neuro)  Psychiatric/Behavioral: Positive for dysphoric mood (controlled) and sleep disturbance ( controlled)  The patient is nervous/anxious (controlled)            Past Medical History:   Diagnosis Date   • Allergic    • Anxiety    • Asthma    • Depression    • FHx: migraine headaches    • Headache(784 0)    • High blood pressure    • Hypertension    • Overdose of drug/medicinal substance, undetermined intent, initial encounter 08/31/2021         Current Outpatient Medications:   •  albuterol (ProAir HFA) 90 mcg/act inhaler, Inhale 2 puffs every 6 (six) hours as needed for wheezing, Disp: 18 g, Rfl: 1  •  ALPRAZolam (XANAX) 0 5 mg tablet, Take 1 tablet (0 5 mg total) by mouth 2 (two) times a day as needed for anxiety, Disp: 30 tablet, Rfl: 0  •  buPROPion (WELLBUTRIN) 75 mg tablet, Take 1 tablet (75 mg total) by mouth 2 (two) times a day, Disp: 180 tablet, Rfl: 1  •  busPIRone (BUSPAR) 7 5 mg tablet, Take 1 tablet (7 5 mg total) by mouth 2 (two) times a day, Disp: 180 tablet, Rfl: 1  •  Galcanezumab-gnlm 120 MG/ML SOAJ, 1 subcu injection q30 days, Disp: 1 mL, Rfl: 11  •  lisinopril (ZESTRIL) 10 mg tablet, Take 1 tablet (10 mg total) by mouth daily, Disp: 90 tablet, Rfl: 1  •  methocarbamol (ROBAXIN) 500 mg tablet, Take 1 tablet (500 mg total) by mouth daily at bedtime as needed for muscle spasms, Disp: 30 tablet, Rfl: 1  •  metoclopramide (REGLAN) 10 mg tablet, 1 tab as needed at onset of migraine, can repeat in 8 hours, can combine with triptan/NSAID, Disp: 20 tablet, Rfl: 3  •  sertraline (ZOLOFT) 100 mg tablet, Take 2 tablets (200 mg total) by mouth daily, Disp: 180 tablet, Rfl: 1  •  valACYclovir (VALTREX) 500 mg tablet, Take 1 tablet (500 mg total) by mouth daily, Disp: 90 tablet, Rfl: 1  •  ZOLMitriptan (ZOMIG) 5 MG tablet, 1/2-1 tab at onset of migraine, can repeat in 2 hours, max 10mg/24 hours, Disp: 12 tablet, Rfl: 3  •  zolpidem (AMBIEN) 5 mg tablet, Take 1 tablet (5 mg total) by mouth as needed for sleep, Disp: 30 tablet, Rfl: 0    Allergies   Allergen Reactions   • Clarithromycin Hives   • Tetracyclines & Related Hives       Social History   History reviewed  No pertinent surgical history    Family History   Problem Relation Age of Onset   • Hypertension Mother    • Atrial fibrillation Mother    • Stroke Mother    • Depression Mother    • Hypertension Father    • Stroke Maternal Grandmother    • Hypertension Maternal Grandmother    • Atrial fibrillation Maternal Grandmother    • Stroke Maternal Grandfather    • Hypertension Maternal Grandfather    • Heart disease Maternal Grandfather    • Hypertension Paternal Grandfather    • Thyroid disease Neg Hx    • Diabetes Neg Hx        Objective:  /70 (BP Location: Left arm, Patient Position: Sitting, Cuff Size: Standard)   Pulse 83   Temp 98 4 °F (36 9 °C) (Temporal)   Ht 5' 7" (1 702 m)   Wt 106 kg (234 lb 9 6 oz)   SpO2 99% Comment: room air  BMI 36 74 kg/m²      Physical Exam  Vitals reviewed  Constitutional:       General: She is not in acute distress  Appearance: Normal appearance  She is obese  She is not diaphoretic  HENT:      Head: Normocephalic and atraumatic  Eyes:      Extraocular Movements: Extraocular movements intact  Conjunctiva/sclera: Conjunctivae normal       Pupils: Pupils are equal, round, and reactive to light  Cardiovascular:      Rate and Rhythm: Normal rate and regular rhythm  Heart sounds: Normal heart sounds  No murmur heard  Pulmonary:      Effort: Pulmonary effort is normal  No respiratory distress  Breath sounds: Normal breath sounds  No wheezing, rhonchi or rales  Neurological:      Mental Status: She is alert and oriented to person, place, and time  Mental status is at baseline  Psychiatric:         Mood and Affect: Mood normal          Behavior: Behavior normal          Thought Content:  Thought content normal          Judgment: Judgment normal

## 2022-11-17 NOTE — ASSESSMENT & PLAN NOTE
- controlled on lisinopril 10 mg daily  - discussed with patient that we will monitor this closely after her sleeve gastrectomy in January, may need to titrate down

## 2022-11-17 NOTE — ASSESSMENT & PLAN NOTE
- controlled on current regimen  - will adjust her Wellbutrin to immediate release due to her upcoming bariatric surgery  - continue Wellbutrin 75 mg b i d   And sertraline 200 mg daily  - continue counseling

## 2022-11-21 DIAGNOSIS — Z01.818 PREOPERATIVE TESTING: Primary | ICD-10-CM

## 2022-11-21 NOTE — PROGRESS NOTES
Name             Megha Campos      Starting weight 231  Last time weight 231 7  Today's weight 233 6       Surgery month:  Oct/Nov  Surgery deadline: Feb  Surgeon: Dr Pagan Sick Follow Up Note:    Interested in the sleeve    Mental health and wellness - Patient presents to the office today for weight check and support visit  The pt shared she is ready for surgery and reports no concerns  Supports are her partner  The pt reports not eating in front of the TV and practicing no mindless eating  Eating behaviors - three meals a day, mindful of portions and  also using small plates  Hydration-water, cup of milk once in a while on her cereal  Practice 30/60 rule  Practicing slow eating    Activity -  Fostered a dog which she walks nightly  Progress toward program requirements    Workflow:  • Psych and/or D+A Clearance: not needed  • PCP Letter: done 3/17/22  • Support Group: 9/14/2022  • Surgeon Appt  8/18/22  • EGD: 10/31/22  • Cardiac Risk Assessment  9/2/22  • Sleep Studies home sleep study 7/5/22:  No CHALO  • Blood work done 7/5/22: WNL  • Nicotine test nonsmoker  Reviewed and discussed  Adequate hydration  Exercise  Meal planning and preparation  Lifestyle changes  Possible problems with poor eating habits  Techniques for self monitoring and keeping daily food journal    Goal: 1-practice her 30/60 rule    Next appointment:12/1/22 pre-op class

## 2022-11-25 ENCOUNTER — OFFICE VISIT (OUTPATIENT)
Dept: BARIATRICS | Facility: CLINIC | Age: 32
End: 2022-11-25

## 2022-11-25 DIAGNOSIS — E66.9 OBESITY, CLASS II, BMI 35-39.9: Primary | ICD-10-CM

## 2022-11-26 ENCOUNTER — APPOINTMENT (OUTPATIENT)
Dept: LAB | Facility: CLINIC | Age: 32
End: 2022-11-26

## 2022-11-26 DIAGNOSIS — Z01.818 PREOPERATIVE TESTING: ICD-10-CM

## 2022-11-26 LAB
ALBUMIN SERPL BCP-MCNC: 4.3 G/DL (ref 3.5–5)
ALP SERPL-CCNC: 79 U/L (ref 34–104)
ALT SERPL W P-5'-P-CCNC: 12 U/L (ref 7–52)
ANION GAP SERPL CALCULATED.3IONS-SCNC: 6 MMOL/L (ref 4–13)
AST SERPL W P-5'-P-CCNC: 12 U/L (ref 13–39)
BILIRUB SERPL-MCNC: 0.37 MG/DL (ref 0.2–1)
BUN SERPL-MCNC: 13 MG/DL (ref 5–25)
CALCIUM SERPL-MCNC: 9 MG/DL (ref 8.4–10.2)
CHLORIDE SERPL-SCNC: 105 MMOL/L (ref 96–108)
CO2 SERPL-SCNC: 28 MMOL/L (ref 21–32)
CREAT SERPL-MCNC: 0.8 MG/DL (ref 0.6–1.3)
ERYTHROCYTE [DISTWIDTH] IN BLOOD BY AUTOMATED COUNT: 12.2 % (ref 11.6–15.1)
GFR SERPL CREATININE-BSD FRML MDRD: 99 ML/MIN/1.73SQ M
GLUCOSE P FAST SERPL-MCNC: 97 MG/DL (ref 65–99)
HCT VFR BLD AUTO: 39.8 % (ref 34.8–46.1)
HGB BLD-MCNC: 13.1 G/DL (ref 11.5–15.4)
MCH RBC QN AUTO: 30.3 PG (ref 26.8–34.3)
MCHC RBC AUTO-ENTMCNC: 32.9 G/DL (ref 31.4–37.4)
MCV RBC AUTO: 92 FL (ref 82–98)
PLATELET # BLD AUTO: 273 THOUSANDS/UL (ref 149–390)
PMV BLD AUTO: 9.7 FL (ref 8.9–12.7)
POTASSIUM SERPL-SCNC: 4 MMOL/L (ref 3.5–5.3)
PROT SERPL-MCNC: 7.3 G/DL (ref 6.4–8.4)
RBC # BLD AUTO: 4.33 MILLION/UL (ref 3.81–5.12)
SODIUM SERPL-SCNC: 139 MMOL/L (ref 135–147)
WBC # BLD AUTO: 7.61 THOUSAND/UL (ref 4.31–10.16)

## 2022-11-30 NOTE — PROGRESS NOTES
Weight Management Nutrition Class     Diagnosis: Morbid Obesity    Bariatric Surgeon: Dr Jem Carpenter    Surgery: Lap Sleeve Gastrectomy    Class: Pre-op Class    Pt attended pre-op education session  Standardized packet of information for bariatric surgery was sent via email and was reviewed with pt  Importance of lifestyle change and development of regular exercise routine stressed  Pt given the opportunity to ask questions  Ensure pre-surgery drink instructions were given  Questions were answered  Pt verbalized understanding of all information provided  Pt appeared prepared for upcoming surgery  Pt  educated on two-week pre operative liver shrinking diet  Pt understands that the diet needs to be followed for 2 weeks prior to surgery  Handout reviewed  Emphasized the need to drink 80 ounces of fluid per day while on the diet  Reviewed pre-op ERAS drink, post-operative clear liquid, full liquid, and pureed diet, post-operative nutrition rules and facts, and post-operative bariatric multivitamin/mineral recommendations and brand comparison  Contact information provided for any questions/concerns  Patient was able to verbalize basic diet (protein, fluid, vitamin and mineral) recommendations and possible nutrition-related complications   Yes

## 2022-12-01 ENCOUNTER — CLINICAL SUPPORT (OUTPATIENT)
Dept: BARIATRICS | Facility: CLINIC | Age: 32
End: 2022-12-01

## 2022-12-01 DIAGNOSIS — E66.01 CLASS 2 SEVERE OBESITY DUE TO EXCESS CALORIES WITH SERIOUS COMORBIDITY AND BODY MASS INDEX (BMI) OF 36.0 TO 36.9 IN ADULT (HCC): Primary | ICD-10-CM

## 2022-12-05 ENCOUNTER — TELEPHONE (OUTPATIENT)
Dept: BARIATRICS | Facility: CLINIC | Age: 32
End: 2022-12-05

## 2022-12-07 ENCOUNTER — TELEPHONE (OUTPATIENT)
Dept: NEUROLOGY | Facility: CLINIC | Age: 32
End: 2022-12-07

## 2022-12-07 NOTE — TELEPHONE ENCOUNTER
Fax received from [facility] requesting PA for emgality  Key: [id number]    PA submitted, awaiting determination

## 2023-01-04 NOTE — PRE-PROCEDURE INSTRUCTIONS
Pre-Surgery Instructions:   Medication Instructions   • albuterol (ProAir HFA) 90 mcg/act inhaler Uses PRN- OK to take day of surgery   • ALPRAZolam (XANAX) 0 5 mg tablet Take day of surgery  • buPROPion (WELLBUTRIN) 75 mg tablet Take day of surgery  • busPIRone (BUSPAR) 7 5 mg tablet Take day of surgery  • Galcanezumab-gnlm 120 MG/ML SOAJ Hold day of surgery  • lisinopril (ZESTRIL) 10 mg tablet Hold day of surgery  • methocarbamol (ROBAXIN) 500 mg tablet Uses PRN- OK to take day of surgery   • metoclopramide (REGLAN) 10 mg tablet Uses PRN- OK to take day of surgery   • sertraline (ZOLOFT) 100 mg tablet Take day of surgery  • valACYclovir (VALTREX) 500 mg tablet Take night before surgery   • ZOLMitriptan (ZOMIG) 5 MG tablet Uses PRN- OK to take day of surgery   • zolpidem (AMBIEN) 5 mg tablet Take night before surgery        NPO after midnight, meds in am with sips of water  Understands when to expect preop phone call  Remove all jewelry  Advised of visitation policy  Before your operation, you play an important role in decreasing your risk for infection by washing with special antiseptic soap  This is an effective way to reduce bacteria on the skin which may help to prevent infections at the surgical site  Please read the following directions in advance  1  In the week before your operation purchase a 4 ounce bottle of antiseptic soap containing chlorhexidine gluconate 4%  Some brand names include: Aplicare, Endure, and Hibiclens  The cost is usually less than $5 00  · For your convenience, the [address] [place] [address] carries the soap  · It may also be available at your doctor's office or pre-admission testing center, and at most retail pharmacies  · If you are allergic or sensitive to soaps containing chlorhexidine gluconate (CHG), please let your doctor know so another antiseptic soap can be suggested  · CHG antiseptic soap is for external use only    2      The day before your operation follow these directions carefully to get ready  · Place clean lines (sheets) on your bed; you should sleep on clean sheets after your evening shower  · Get clean towels and washcloths ready - you need enough for 2 showers  · Set aside clean underwear, pajamas, and clothing to wear after the shower  Reminders:  · DO NOT use any other soap or body rinse on your skin during or after the antiseptic showers  · DO NOT use lotion , powder, deodorant, or perfume/aftershave of any kind on your skin after your antiseptic shower  · DO NOT shave any body parts in the 24 hours/the day before your operation  · DO NOT get the antiseptic soap in your eyes, ears, nose, mouth, or vaginal area  3      You will need to shower the night before AND the morning of your Surgery  Shower 1:  · The evening before your operation, take the fist shower  · First, shampoo your hair with regular shampoo and rinse it completely before you use the anitseptic soap  After washing and rinsing your hair, rinse your body  · Next, use a clean wash cloth to apply the antiseptic soap and wash your body from the neck down to your toes using 1/2 bottle of the antiseptic soap  You will use the other 1/2 bottle for the second shower  · Clean the area where your incision will be; later this area well for about 2 minutes  · If you ar having head or neck surgery, wash areas with the antiseptic soap  · Rinse yourself completely with running water  · Use a clean towel to dry off  · Wear clean underwear and clothing/pajamas  Shower 2:  · The Morning of your operation, take the second shower following the same steps as Shower 1 using the second 1/2 of the bottle of antiseptic soap  · Use clean cloths and towels to was and dry yourself off  · Wear clean underwear and clothing

## 2023-01-10 ENCOUNTER — TELEPHONE (OUTPATIENT)
Dept: BARIATRICS | Facility: CLINIC | Age: 33
End: 2023-01-10

## 2023-01-13 ENCOUNTER — OFFICE VISIT (OUTPATIENT)
Dept: BARIATRICS | Facility: CLINIC | Age: 33
End: 2023-01-13

## 2023-01-13 VITALS
BODY MASS INDEX: 36.65 KG/M2 | WEIGHT: 233.5 LBS | TEMPERATURE: 96.7 F | RESPIRATION RATE: 16 BRPM | SYSTOLIC BLOOD PRESSURE: 110 MMHG | OXYGEN SATURATION: 97 % | HEART RATE: 82 BPM | DIASTOLIC BLOOD PRESSURE: 80 MMHG | HEIGHT: 67 IN

## 2023-01-13 DIAGNOSIS — E66.9 OBESITY, CLASS II, BMI 35-39.9: Primary | ICD-10-CM

## 2023-01-13 DIAGNOSIS — I10 ESSENTIAL HYPERTENSION: ICD-10-CM

## 2023-01-13 DIAGNOSIS — G43.109 MIGRAINE WITH AURA AND WITHOUT STATUS MIGRAINOSUS, NOT INTRACTABLE: ICD-10-CM

## 2023-01-13 DIAGNOSIS — F41.9 ANXIETY: ICD-10-CM

## 2023-01-13 DIAGNOSIS — J45.20 MILD INTERMITTENT ASTHMA WITHOUT COMPLICATION: ICD-10-CM

## 2023-01-13 RX ORDER — CELECOXIB 200 MG/1
200 CAPSULE ORAL ONCE
Status: CANCELLED | OUTPATIENT
Start: 2023-01-17 | End: 2023-01-13

## 2023-01-13 RX ORDER — SCOLOPAMINE TRANSDERMAL SYSTEM 1 MG/1
1 PATCH, EXTENDED RELEASE TRANSDERMAL ONCE
Status: CANCELLED | OUTPATIENT
Start: 2023-01-17 | End: 2023-01-13

## 2023-01-13 RX ORDER — CEFAZOLIN SODIUM 2 G/50ML
2000 SOLUTION INTRAVENOUS ONCE
Status: CANCELLED | OUTPATIENT
Start: 2023-01-17 | End: 2023-01-13

## 2023-01-13 RX ORDER — GABAPENTIN 300 MG/1
300 CAPSULE ORAL ONCE
Status: CANCELLED | OUTPATIENT
Start: 2023-01-17 | End: 2023-01-13

## 2023-01-13 RX ORDER — ENOXAPARIN SODIUM 100 MG/ML
40 INJECTION SUBCUTANEOUS
Status: CANCELLED | OUTPATIENT
Start: 2023-01-17 | End: 2023-01-18

## 2023-01-13 RX ORDER — ACETAMINOPHEN 325 MG/1
975 TABLET ORAL ONCE
Status: CANCELLED | OUTPATIENT
Start: 2023-01-17 | End: 2023-01-13

## 2023-01-13 NOTE — H&P (VIEW-ONLY)
BARIATRIC H&P - BARIATRIC SURGERY  Kashif Perrin 29 y o  female MRN: [id number]  Unit/Bed#:  Encounter: [id number]      HPI:  Kashif Perrin is a 29 y o  female who presents with a long-standing history of morbid obesity  She was found to be a good candidate to undergo a bariatric operation [facility]  She is here today to discuss details of her surgery  Review of Systems   All other systems reviewed and are negative  Historical Information   Past Medical History:   Diagnosis Date   • Allergic    • Anxiety    • Asthma    • Depression    • FHx: migraine headaches    • Headache(784 0)    • High blood pressure    • Hypertension    • Overdose of drug/medicinal substance, undetermined intent, initial encounter 08/31/2021    Accidental     Past Surgical History:   Procedure Laterality Date   • EGD     • WISDOM TOOTH EXTRACTION      no anesthesia     Social History   Social History     Substance and Sexual Activity   Alcohol Use Yes    Comment: 2 x week     Social History     Substance and Sexual Activity   Drug Use No     Social History     Tobacco Use   Smoking Status Never   Smokeless Tobacco Never     Family History: non-contributory    Meds/Allergies   all medications and allergies reviewed  Allergies   Allergen Reactions   • Clarithromycin Hives   • Tetracyclines & Related Hives       Objective     Current Vitals:   Blood Pressure: 110/80 (01/13/23 0859)  Pulse: 82 (01/13/23 0859)  Temperature: (!) 96 7 °F (35 9 °C) (01/13/23 0859)  Respirations: 16 (01/13/23 0859)  Height: 5' 7" (170 2 cm) (01/13/23 0859)  Weight - Scale: 106 kg (233 lb 8 oz) (01/13/23 0859)  SpO2: 97 % (01/13/23 0859)      Invasive Devices     None                 Physical Exam  Vitals and nursing note reviewed  Constitutional:       General: She is not in acute distress  Appearance: Normal appearance  She is well-developed  She is not diaphoretic     HENT:      Head: Normocephalic and atraumatic  Nose: Nose normal    Eyes:      General: No scleral icterus  Right eye: No discharge  Left eye: No discharge  Conjunctiva/sclera: Conjunctivae normal    Cardiovascular:      Rate and Rhythm: Normal rate and regular rhythm  Heart sounds: Normal heart sounds  Pulmonary:      Effort: Pulmonary effort is normal  No respiratory distress  Breath sounds: Normal breath sounds  No stridor  No wheezing or rales  Chest:      Chest wall: No tenderness  Abdominal:      General: Bowel sounds are normal       Palpations: Abdomen is soft  Tenderness: There is no abdominal tenderness  There is no guarding or rebound  Comments: Abdomen is obese, soft and benign  Musculoskeletal:         General: No deformity  Normal range of motion  Cervical back: Normal range of motion and neck supple  Lymphadenopathy:      Cervical: No cervical adenopathy  Skin:     General: Skin is warm and dry  Findings: No erythema or rash  Neurological:      Mental Status: She is alert and oriented to person, place, and time  Psychiatric:         Behavior: Behavior normal          Thought Content: Thought content normal          Judgment: Judgment normal          Lab Results: I have personally reviewed pertinent lab results  Imaging: I have personally reviewed pertinent reports  EKG, Pathology, and Other Studies: I have personally reviewed pertinent reports  The endoscopy showed mild antral gastritis  The biopsies revealed  Final Diagnosis  A  Stomach, antrum:  - Fragments of benign gastric antral mucosa with chronic inactive gastritis  - No Helicobacter pylori organisms are identified with routine H&E stain          Assessment/PLAN:    29 y o  female morbidly obese found to be a good candidate to undergo a weight loss operation upon being enrolled here at the [facility]     Patient has a long history of morbid obesity and is presenting to discuss the surgical weight loss options  Despite the patient best efforts patient was unable to lose any meaningful or sustainable weight using nonsurgical means  We had a long discussion regarding all the surgical weight-loss options at our disposal at this point and reviewed the risks and benefits of each procedure in details as it relates to her age, BMI and medical conditions  She has been pre certified to undergo a Laparoscopic sleeve gastrectomy  We have discussed the 14%-20% incidence of post op heartburn after the sleeve gastrectomy and the fact that if this cannot be controlled with medication or conservative measures it might need to be converted to a Matthew-en-Y gastric bypass  We have also discussed the fact that the patient needs to be followed up postoperatively and potentially get screening endoscopies in the future to rule out any development of pathology as a result of the sleeve gastrectomy  Here today to review her pre op test results  Has been medically cleared for the procedure  I have discussed with her at length the risks and benefits of the operation and reiterated the components of our multidisciplinary program and the importance of compliance and follow up in the post operative period  Although there is a great statistical chance of improvement or even resolution of most of her associated comorbidities, the results vary from patient to patient and they largely depend on her commitment  The patient was also instructed with regards to the importance of behavior modification, nutritional counseling, support meeting attendance and lifestyle changes that are important to ensure success  She was given the opportunity to ask questions and I have answered all of them  I have addressed with the patient the level of CODE STATUS for this hospital stay and after explaining the different options currently she wishes to be a Level I      She understands and wishes to proceed  She has lost all the weight required prior to surgery      Grace Huston MD  1/13/2023  9:09 AM

## 2023-01-13 NOTE — H&P
BARIATRIC H&P - BARIATRIC SURGERY  Lamar Valero 29 y o  female MRN: [id number]  Unit/Bed#:  Encounter: [id number]      HPI:  Lamar Valero is a 29 y o  female who presents with a long-standing history of morbid obesity  She was found to be a good candidate to undergo a bariatric operation upon [facility]  She is here today to discuss details of her surgery  Review of Systems   All other systems reviewed and are negative  Historical Information   Past Medical History:   Diagnosis Date   • Allergic    • Anxiety    • Asthma    • Depression    • FHx: migraine headaches    • Headache(784 0)    • High blood pressure    • Hypertension    • Overdose of drug/medicinal substance, undetermined intent, initial encounter 08/31/2021    Accidental     Past Surgical History:   Procedure Laterality Date   • EGD     • WISDOM TOOTH EXTRACTION      no anesthesia     Social History   Social History     Substance and Sexual Activity   Alcohol Use Yes    Comment: 2 x week     Social History     Substance and Sexual Activity   Drug Use No     Social History     Tobacco Use   Smoking Status Never   Smokeless Tobacco Never     Family History: non-contributory    Meds/Allergies   all medications and allergies reviewed  Allergies   Allergen Reactions   • Clarithromycin Hives   • Tetracyclines & Related Hives       Objective     Current Vitals:   Blood Pressure: 110/80 (01/13/23 0859)  Pulse: 82 (01/13/23 0859)  Temperature: (!) 96 7 °F (35 9 °C) (01/13/23 0859)  Respirations: 16 (01/13/23 0859)  Height: 5' 7" (170 2 cm) (01/13/23 0859)  Weight - Scale: 106 kg (233 lb 8 oz) (01/13/23 0859)  SpO2: 97 % (01/13/23 0859)      Invasive Devices     None                 Physical Exam  Vitals and nursing note reviewed  Constitutional:       General: She is not in acute distress  Appearance: Normal appearance  She is well-developed  She is not diaphoretic     HENT:      Head: Normocephalic and atraumatic  Nose: Nose normal    Eyes:      General: No scleral icterus  Right eye: No discharge  Left eye: No discharge  Conjunctiva/sclera: Conjunctivae normal    Cardiovascular:      Rate and Rhythm: Normal rate and regular rhythm  Heart sounds: Normal heart sounds  Pulmonary:      Effort: Pulmonary effort is normal  No respiratory distress  Breath sounds: Normal breath sounds  No stridor  No wheezing or rales  Chest:      Chest wall: No tenderness  Abdominal:      General: Bowel sounds are normal       Palpations: Abdomen is soft  Tenderness: There is no abdominal tenderness  There is no guarding or rebound  Comments: Abdomen is obese, soft and benign  Musculoskeletal:         General: No deformity  Normal range of motion  Cervical back: Normal range of motion and neck supple  Lymphadenopathy:      Cervical: No cervical adenopathy  Skin:     General: Skin is warm and dry  Findings: No erythema or rash  Neurological:      Mental Status: She is alert and oriented to person, place, and time  Psychiatric:         Behavior: Behavior normal          Thought Content: Thought content normal          Judgment: Judgment normal          Lab Results: I have personally reviewed pertinent lab results  Imaging: I have personally reviewed pertinent reports  EKG, Pathology, and Other Studies: I have personally reviewed pertinent reports  The endoscopy showed mild antral gastritis  The biopsies revealed  Final Diagnosis  A  Stomach, antrum:  - Fragments of benign gastric antral mucosa with chronic inactive gastritis  - No Helicobacter pylori organisms are identified with routine H&E stain          Assessment/PLAN:    29 y o  female morbidly obese found to be a good candidate to undergo a weight loss operation upon being enrolled here at the [facility]     Patient has a long history of morbid obesity and is presenting to discuss the surgical weight loss options  Despite the patient best efforts patient was unable to lose any meaningful or sustainable weight using nonsurgical means  We had a long discussion regarding all the surgical weight-loss options at our disposal at this point and reviewed the risks and benefits of each procedure in details as it relates to her age, BMI and medical conditions  She has been pre certified to undergo a Laparoscopic sleeve gastrectomy  We have discussed the 14%-20% incidence of post op heartburn after the sleeve gastrectomy and the fact that if this cannot be controlled with medication or conservative measures it might need to be converted to a Matthew-en-Y gastric bypass  We have also discussed the fact that the patient needs to be followed up postoperatively and potentially get screening endoscopies in the future to rule out any development of pathology as a result of the sleeve gastrectomy  Here today to review her pre op test results  Has been medically cleared for the procedure  I have discussed with her at length the risks and benefits of the operation and reiterated the components of our multidisciplinary program and the importance of compliance and follow up in the post operative period  Although there is a great statistical chance of improvement or even resolution of most of her associated comorbidities, the results vary from patient to patient and they largely depend on her commitment  The patient was also instructed with regards to the importance of behavior modification, nutritional counseling, support meeting attendance and lifestyle changes that are important to ensure success  She was given the opportunity to ask questions and I have answered all of them  I have addressed with the patient the level of CODE STATUS for this hospital stay and after explaining the different options currently she wishes to be a Level I      She understands and wishes to proceed  She has lost all the weight required prior to surgery      Gisele Johnson MD  1/13/2023  9:09 AM

## 2023-01-16 ENCOUNTER — ANESTHESIA EVENT (OUTPATIENT)
Dept: PERIOP | Facility: HOSPITAL | Age: 33
End: 2023-01-16

## 2023-01-16 DIAGNOSIS — E66.9 OBESITY, CLASS II, BMI 35-39.9: Primary | ICD-10-CM

## 2023-01-16 NOTE — TELEPHONE ENCOUNTER
PO meds for laparoscopic Sleeve Gastrectomy with robotics and Intraoperative EGD  SX 1/17/2023  **ALLERGIES**       Clarithromycin- Severity Medium-Hives  Tetracyclines& related Severity Medium-Hives

## 2023-01-17 ENCOUNTER — ANESTHESIA (OUTPATIENT)
Dept: PERIOP | Facility: HOSPITAL | Age: 33
End: 2023-01-17

## 2023-01-17 ENCOUNTER — HOSPITAL ENCOUNTER (INPATIENT)
Facility: HOSPITAL | Age: 33
LOS: 1 days | Discharge: HOME | End: 2023-01-18
Attending: SURGERY | Admitting: SURGERY

## 2023-01-17 DIAGNOSIS — J45.909 ASTHMA: ICD-10-CM

## 2023-01-17 DIAGNOSIS — E66.9 OBESITY, CLASS II, BMI 35-39.9: Primary | ICD-10-CM

## 2023-01-17 DIAGNOSIS — I10 ESSENTIAL HYPERTENSION: ICD-10-CM

## 2023-01-17 DIAGNOSIS — E66.9 OBESITY: ICD-10-CM

## 2023-01-17 LAB
EXT PREGNANCY TEST URINE: NEGATIVE
EXT. CONTROL: NORMAL

## 2023-01-17 PROCEDURE — 0DJ08ZZ INSPECTION OF UPPER INTESTINAL TRACT, VIA NATURAL OR ARTIFICIAL OPENING ENDOSCOPIC: ICD-10-PCS | Performed by: SURGERY

## 2023-01-17 PROCEDURE — 8E0W4CZ ROBOTIC ASSISTED PROCEDURE OF TRUNK REGION, PERCUTANEOUS ENDOSCOPIC APPROACH: ICD-10-PCS | Performed by: SURGERY

## 2023-01-17 PROCEDURE — 0DB64Z3 EXCISION OF STOMACH, PERCUTANEOUS ENDOSCOPIC APPROACH, VERTICAL: ICD-10-PCS | Performed by: SURGERY

## 2023-01-17 DEVICE — SEAMGUARD STPL REINF INTUITIVE SUREFORM 60 BLACK: Type: IMPLANTABLE DEVICE | Site: STOMACH | Status: FUNCTIONAL

## 2023-01-17 RX ORDER — DEXAMETHASONE SODIUM PHOSPHATE 10 MG/ML
INJECTION, SOLUTION INTRAMUSCULAR; INTRAVENOUS AS NEEDED
Status: DISCONTINUED | OUTPATIENT
Start: 2023-01-17 | End: 2023-01-17

## 2023-01-17 RX ORDER — ACETAMINOPHEN 160 MG/5ML
975 SUSPENSION, ORAL (FINAL DOSE FORM) ORAL EVERY 8 HOURS
Status: DISCONTINUED | OUTPATIENT
Start: 2023-01-17 | End: 2023-01-18 | Stop reason: HOSPADM

## 2023-01-17 RX ORDER — EPHEDRINE SULFATE 50 MG/ML
INJECTION INTRAVENOUS AS NEEDED
Status: DISCONTINUED | OUTPATIENT
Start: 2023-01-17 | End: 2023-01-17

## 2023-01-17 RX ORDER — NEOSTIGMINE METHYLSULFATE 1 MG/ML
INJECTION INTRAVENOUS AS NEEDED
Status: DISCONTINUED | OUTPATIENT
Start: 2023-01-17 | End: 2023-01-17

## 2023-01-17 RX ORDER — GLYCOPYRROLATE 0.2 MG/ML
INJECTION INTRAMUSCULAR; INTRAVENOUS AS NEEDED
Status: DISCONTINUED | OUTPATIENT
Start: 2023-01-17 | End: 2023-01-17

## 2023-01-17 RX ORDER — FAMOTIDINE 10 MG/ML
20 INJECTION, SOLUTION INTRAVENOUS 2 TIMES DAILY
Status: DISCONTINUED | OUTPATIENT
Start: 2023-01-17 | End: 2023-01-18 | Stop reason: HOSPADM

## 2023-01-17 RX ORDER — ENOXAPARIN SODIUM 100 MG/ML
40 INJECTION SUBCUTANEOUS EVERY 24 HOURS
Status: DISCONTINUED | OUTPATIENT
Start: 2023-01-18 | End: 2023-01-18 | Stop reason: HOSPADM

## 2023-01-17 RX ORDER — SODIUM CHLORIDE, SODIUM LACTATE, POTASSIUM CHLORIDE, CALCIUM CHLORIDE 600; 310; 30; 20 MG/100ML; MG/100ML; MG/100ML; MG/100ML
INJECTION, SOLUTION INTRAVENOUS CONTINUOUS PRN
Status: DISCONTINUED | OUTPATIENT
Start: 2023-01-17 | End: 2023-01-17

## 2023-01-17 RX ORDER — ONDANSETRON 2 MG/ML
INJECTION INTRAMUSCULAR; INTRAVENOUS AS NEEDED
Status: DISCONTINUED | OUTPATIENT
Start: 2023-01-17 | End: 2023-01-17

## 2023-01-17 RX ORDER — METHOCARBAMOL 500 MG/1
500 TABLET, FILM COATED ORAL
Status: DISCONTINUED | OUTPATIENT
Start: 2023-01-17 | End: 2023-01-18 | Stop reason: HOSPADM

## 2023-01-17 RX ORDER — CEFAZOLIN SODIUM 2 G/50ML
2000 SOLUTION INTRAVENOUS ONCE
Status: COMPLETED | OUTPATIENT
Start: 2023-01-17 | End: 2023-01-17

## 2023-01-17 RX ORDER — HYDROMORPHONE HCL/PF 1 MG/ML
0.5 SYRINGE (ML) INJECTION
Status: DISCONTINUED | OUTPATIENT
Start: 2023-01-17 | End: 2023-01-17 | Stop reason: HOSPADM

## 2023-01-17 RX ORDER — ONDANSETRON 2 MG/ML
4 INJECTION INTRAMUSCULAR; INTRAVENOUS EVERY 6 HOURS PRN
Status: DISCONTINUED | OUTPATIENT
Start: 2023-01-17 | End: 2023-01-18 | Stop reason: HOSPADM

## 2023-01-17 RX ORDER — OXYCODONE HCL 5 MG/5 ML
5 SOLUTION, ORAL ORAL EVERY 4 HOURS PRN
Status: DISCONTINUED | OUTPATIENT
Start: 2023-01-17 | End: 2023-01-18 | Stop reason: HOSPADM

## 2023-01-17 RX ORDER — SIMETHICONE 80 MG
80 TABLET,CHEWABLE ORAL 4 TIMES DAILY PRN
Status: DISCONTINUED | OUTPATIENT
Start: 2023-01-17 | End: 2023-01-18 | Stop reason: HOSPADM

## 2023-01-17 RX ORDER — LORAZEPAM 2 MG/ML
1 INJECTION INTRAMUSCULAR ONCE
Status: COMPLETED | OUTPATIENT
Start: 2023-01-17 | End: 2023-01-17

## 2023-01-17 RX ORDER — GABAPENTIN 300 MG/1
300 CAPSULE ORAL ONCE
Status: COMPLETED | OUTPATIENT
Start: 2023-01-17 | End: 2023-01-17

## 2023-01-17 RX ORDER — SODIUM CHLORIDE, SODIUM LACTATE, POTASSIUM CHLORIDE, CALCIUM CHLORIDE 600; 310; 30; 20 MG/100ML; MG/100ML; MG/100ML; MG/100ML
100 INJECTION, SOLUTION INTRAVENOUS CONTINUOUS
Status: DISCONTINUED | OUTPATIENT
Start: 2023-01-17 | End: 2023-01-18

## 2023-01-17 RX ORDER — OXYCODONE HCL 5 MG/5 ML
10 SOLUTION, ORAL ORAL EVERY 4 HOURS PRN
Status: DISCONTINUED | OUTPATIENT
Start: 2023-01-17 | End: 2023-01-18 | Stop reason: HOSPADM

## 2023-01-17 RX ORDER — ROCURONIUM BROMIDE 10 MG/ML
INJECTION, SOLUTION INTRAVENOUS AS NEEDED
Status: DISCONTINUED | OUTPATIENT
Start: 2023-01-17 | End: 2023-01-17

## 2023-01-17 RX ORDER — ENOXAPARIN SODIUM 100 MG/ML
40 INJECTION SUBCUTANEOUS
Status: COMPLETED | OUTPATIENT
Start: 2023-01-17 | End: 2023-01-17

## 2023-01-17 RX ORDER — SODIUM CHLORIDE 9 MG/ML
INJECTION, SOLUTION INTRAVENOUS AS NEEDED
Status: DISCONTINUED | OUTPATIENT
Start: 2023-01-17 | End: 2023-01-17 | Stop reason: HOSPADM

## 2023-01-17 RX ORDER — ACETAMINOPHEN 325 MG/1
975 TABLET ORAL EVERY 8 HOURS
Status: DISCONTINUED | OUTPATIENT
Start: 2023-01-17 | End: 2023-01-18 | Stop reason: HOSPADM

## 2023-01-17 RX ORDER — BUPIVACAINE HYDROCHLORIDE 5 MG/ML
INJECTION, SOLUTION EPIDURAL; INTRACAUDAL AS NEEDED
Status: DISCONTINUED | OUTPATIENT
Start: 2023-01-17 | End: 2023-01-17 | Stop reason: HOSPADM

## 2023-01-17 RX ORDER — FENTANYL CITRATE/PF 50 MCG/ML
50 SYRINGE (ML) INJECTION
Status: DISCONTINUED | OUTPATIENT
Start: 2023-01-17 | End: 2023-01-17 | Stop reason: HOSPADM

## 2023-01-17 RX ORDER — SERTRALINE HYDROCHLORIDE 100 MG/1
200 TABLET, FILM COATED ORAL DAILY
Status: DISCONTINUED | OUTPATIENT
Start: 2023-01-17 | End: 2023-01-18 | Stop reason: HOSPADM

## 2023-01-17 RX ORDER — DIPHENHYDRAMINE HCL 25 MG
25 TABLET ORAL EVERY 8 HOURS PRN
Status: DISCONTINUED | OUTPATIENT
Start: 2023-01-17 | End: 2023-01-18 | Stop reason: HOSPADM

## 2023-01-17 RX ORDER — MIDAZOLAM HYDROCHLORIDE 2 MG/2ML
INJECTION, SOLUTION INTRAMUSCULAR; INTRAVENOUS AS NEEDED
Status: DISCONTINUED | OUTPATIENT
Start: 2023-01-17 | End: 2023-01-17

## 2023-01-17 RX ORDER — CELECOXIB 200 MG/1
200 CAPSULE ORAL ONCE
Status: COMPLETED | OUTPATIENT
Start: 2023-01-17 | End: 2023-01-17

## 2023-01-17 RX ORDER — ALPRAZOLAM 0.5 MG/1
0.5 TABLET ORAL 2 TIMES DAILY PRN
Status: DISCONTINUED | OUTPATIENT
Start: 2023-01-17 | End: 2023-01-18 | Stop reason: HOSPADM

## 2023-01-17 RX ORDER — PROPOFOL 10 MG/ML
INJECTION, EMULSION INTRAVENOUS AS NEEDED
Status: DISCONTINUED | OUTPATIENT
Start: 2023-01-17 | End: 2023-01-17

## 2023-01-17 RX ORDER — SCOLOPAMINE TRANSDERMAL SYSTEM 1 MG/1
1 PATCH, EXTENDED RELEASE TRANSDERMAL ONCE
Status: DISCONTINUED | OUTPATIENT
Start: 2023-01-17 | End: 2023-01-18 | Stop reason: HOSPADM

## 2023-01-17 RX ORDER — MORPHINE SULFATE 4 MG/ML
4 INJECTION, SOLUTION INTRAMUSCULAR; INTRAVENOUS EVERY 4 HOURS PRN
Status: DISCONTINUED | OUTPATIENT
Start: 2023-01-17 | End: 2023-01-18 | Stop reason: HOSPADM

## 2023-01-17 RX ORDER — PROMETHAZINE HYDROCHLORIDE 25 MG/ML
25 INJECTION, SOLUTION INTRAMUSCULAR; INTRAVENOUS EVERY 6 HOURS PRN
Status: DISCONTINUED | OUTPATIENT
Start: 2023-01-17 | End: 2023-01-18 | Stop reason: HOSPADM

## 2023-01-17 RX ORDER — ALBUTEROL SULFATE 90 UG/1
2 AEROSOL, METERED RESPIRATORY (INHALATION) EVERY 6 HOURS PRN
Status: DISCONTINUED | OUTPATIENT
Start: 2023-01-17 | End: 2023-01-18 | Stop reason: HOSPADM

## 2023-01-17 RX ORDER — METOCLOPRAMIDE HYDROCHLORIDE 5 MG/ML
10 INJECTION INTRAMUSCULAR; INTRAVENOUS EVERY 6 HOURS PRN
Status: DISCONTINUED | OUTPATIENT
Start: 2023-01-17 | End: 2023-01-18 | Stop reason: HOSPADM

## 2023-01-17 RX ORDER — BUSPIRONE HYDROCHLORIDE 5 MG/1
7.5 TABLET ORAL 2 TIMES DAILY
Status: DISCONTINUED | OUTPATIENT
Start: 2023-01-17 | End: 2023-01-18 | Stop reason: HOSPADM

## 2023-01-17 RX ORDER — BUPROPION HYDROCHLORIDE 75 MG/1
75 TABLET ORAL 2 TIMES DAILY
Status: DISCONTINUED | OUTPATIENT
Start: 2023-01-17 | End: 2023-01-18 | Stop reason: HOSPADM

## 2023-01-17 RX ORDER — LIDOCAINE HYDROCHLORIDE 10 MG/ML
INJECTION, SOLUTION EPIDURAL; INFILTRATION; INTRACAUDAL; PERINEURAL AS NEEDED
Status: DISCONTINUED | OUTPATIENT
Start: 2023-01-17 | End: 2023-01-17

## 2023-01-17 RX ORDER — FENTANYL CITRATE 50 UG/ML
INJECTION, SOLUTION INTRAMUSCULAR; INTRAVENOUS AS NEEDED
Status: DISCONTINUED | OUTPATIENT
Start: 2023-01-17 | End: 2023-01-17

## 2023-01-17 RX ORDER — ACETAMINOPHEN 325 MG/1
975 TABLET ORAL ONCE
Status: COMPLETED | OUTPATIENT
Start: 2023-01-17 | End: 2023-01-17

## 2023-01-17 RX ORDER — ZOLPIDEM TARTRATE 5 MG/1
5 TABLET ORAL
Status: DISCONTINUED | OUTPATIENT
Start: 2023-01-17 | End: 2023-01-18 | Stop reason: HOSPADM

## 2023-01-17 RX ORDER — MORPHINE SULFATE 10 MG/ML
4 INJECTION, SOLUTION INTRAMUSCULAR; INTRAVENOUS EVERY 4 HOURS PRN
Status: DISCONTINUED | OUTPATIENT
Start: 2023-01-17 | End: 2023-01-17 | Stop reason: SDUPTHER

## 2023-01-17 RX ORDER — HYDROMORPHONE HCL/PF 1 MG/ML
SYRINGE (ML) INJECTION AS NEEDED
Status: DISCONTINUED | OUTPATIENT
Start: 2023-01-17 | End: 2023-01-17

## 2023-01-17 RX ORDER — ONDANSETRON 2 MG/ML
4 INJECTION INTRAMUSCULAR; INTRAVENOUS ONCE AS NEEDED
Status: COMPLETED | OUTPATIENT
Start: 2023-01-17 | End: 2023-01-17

## 2023-01-17 RX ORDER — SODIUM CHLORIDE 9 MG/ML
125 INJECTION, SOLUTION INTRAVENOUS CONTINUOUS
Status: DISCONTINUED | OUTPATIENT
Start: 2023-01-17 | End: 2023-01-18

## 2023-01-17 RX ADMIN — PROPOFOL 200 MG: 10 INJECTION, EMULSION INTRAVENOUS at 10:18

## 2023-01-17 RX ADMIN — OXYCODONE HYDROCHLORIDE 10 MG: 5 SOLUTION ORAL at 23:54

## 2023-01-17 RX ADMIN — ONDANSETRON HYDROCHLORIDE 4 MG: 2 SOLUTION INTRAMUSCULAR; INTRAVENOUS at 12:22

## 2023-01-17 RX ADMIN — OXYCODONE HYDROCHLORIDE 10 MG: 5 SOLUTION ORAL at 14:07

## 2023-01-17 RX ADMIN — MIDAZOLAM 2 MG: 1 INJECTION INTRAMUSCULAR; INTRAVENOUS at 10:07

## 2023-01-17 RX ADMIN — METOCLOPRAMIDE 10 MG: 5 INJECTION, SOLUTION INTRAMUSCULAR; INTRAVENOUS at 22:24

## 2023-01-17 RX ADMIN — SERTRALINE 200 MG: 100 TABLET, FILM COATED ORAL at 18:59

## 2023-01-17 RX ADMIN — ENOXAPARIN SODIUM 40 MG: 40 INJECTION SUBCUTANEOUS at 08:55

## 2023-01-17 RX ADMIN — FENTANYL CITRATE 50 MCG: 50 INJECTION, SOLUTION INTRAMUSCULAR; INTRAVENOUS at 12:24

## 2023-01-17 RX ADMIN — TRIMETHOBENZAMIDE HYDROCHLORIDE 200 MG: 100 INJECTION INTRAMUSCULAR at 12:52

## 2023-01-17 RX ADMIN — SODIUM CHLORIDE, SODIUM LACTATE, POTASSIUM CHLORIDE, AND CALCIUM CHLORIDE: .6; .31; .03; .02 INJECTION, SOLUTION INTRAVENOUS at 10:33

## 2023-01-17 RX ADMIN — LIDOCAINE HYDROCHLORIDE 100 MG: 10 INJECTION, SOLUTION EPIDURAL; INFILTRATION; INTRACAUDAL; PERINEURAL at 10:18

## 2023-01-17 RX ADMIN — SODIUM CHLORIDE, SODIUM LACTATE, POTASSIUM CHLORIDE, AND CALCIUM CHLORIDE 100 ML/HR: 600; 310; 30; 20 INJECTION, SOLUTION INTRAVENOUS at 13:14

## 2023-01-17 RX ADMIN — MIDAZOLAM 2 MG: 1 INJECTION INTRAMUSCULAR; INTRAVENOUS at 10:11

## 2023-01-17 RX ADMIN — SCOPALAMINE 1 PATCH: 1 PATCH, EXTENDED RELEASE TRANSDERMAL at 08:15

## 2023-01-17 RX ADMIN — ONDANSETRON 4 MG: 2 INJECTION INTRAMUSCULAR; INTRAVENOUS at 11:14

## 2023-01-17 RX ADMIN — HYDROMORPHONE HYDROCHLORIDE 0.5 MG: 1 INJECTION, SOLUTION INTRAMUSCULAR; INTRAVENOUS; SUBCUTANEOUS at 11:01

## 2023-01-17 RX ADMIN — HYDROMORPHONE HYDROCHLORIDE 0.5 MG: 1 INJECTION, SOLUTION INTRAMUSCULAR; INTRAVENOUS; SUBCUTANEOUS at 13:00

## 2023-01-17 RX ADMIN — SODIUM CHLORIDE 125 ML/HR: 0.9 INJECTION, SOLUTION INTRAVENOUS at 08:20

## 2023-01-17 RX ADMIN — BUSPIRONE HYDROCHLORIDE 7.5 MG: 5 TABLET ORAL at 19:00

## 2023-01-17 RX ADMIN — HYDROMORPHONE HYDROCHLORIDE 0.5 MG: 1 INJECTION, SOLUTION INTRAMUSCULAR; INTRAVENOUS; SUBCUTANEOUS at 10:40

## 2023-01-17 RX ADMIN — CEFAZOLIN SODIUM 2000 MG: 2 SOLUTION INTRAVENOUS at 10:15

## 2023-01-17 RX ADMIN — FENTANYL CITRATE 50 MCG: 50 INJECTION INTRAMUSCULAR; INTRAVENOUS at 11:57

## 2023-01-17 RX ADMIN — FENTANYL CITRATE 100 MCG: 50 INJECTION INTRAMUSCULAR; INTRAVENOUS at 10:18

## 2023-01-17 RX ADMIN — ONDANSETRON 4 MG: 2 INJECTION INTRAMUSCULAR; INTRAVENOUS at 18:55

## 2023-01-17 RX ADMIN — CELECOXIB 200 MG: 200 CAPSULE ORAL at 08:15

## 2023-01-17 RX ADMIN — ACETAMINOPHEN 975 MG: 325 SUSPENSION ORAL at 22:11

## 2023-01-17 RX ADMIN — GLYCOPYRROLATE 0.8 MG: 0.2 INJECTION INTRAMUSCULAR; INTRAVENOUS at 11:40

## 2023-01-17 RX ADMIN — MORPHINE SULFATE 4 MG: 4 INJECTION INTRAVENOUS at 15:37

## 2023-01-17 RX ADMIN — FAMOTIDINE 20 MG: 10 INJECTION, SOLUTION INTRAVENOUS at 20:41

## 2023-01-17 RX ADMIN — BUPROPION HYDROCHLORIDE 75 MG: 75 TABLET, FILM COATED ORAL at 19:00

## 2023-01-17 RX ADMIN — SIMETHICONE 80 MG: 80 TABLET, CHEWABLE ORAL at 15:37

## 2023-01-17 RX ADMIN — ACETAMINOPHEN 975 MG: 325 TABLET, FILM COATED ORAL at 08:15

## 2023-01-17 RX ADMIN — GABAPENTIN 300 MG: 300 CAPSULE ORAL at 08:15

## 2023-01-17 RX ADMIN — NEOSTIGMINE METHYLSULFATE 5 MG: 1 INJECTION INTRAVENOUS at 11:40

## 2023-01-17 RX ADMIN — OXYCODONE HYDROCHLORIDE 10 MG: 5 SOLUTION ORAL at 18:58

## 2023-01-17 RX ADMIN — SODIUM CHLORIDE, SODIUM LACTATE, POTASSIUM CHLORIDE, AND CALCIUM CHLORIDE: .6; .31; .03; .02 INJECTION, SOLUTION INTRAVENOUS at 11:11

## 2023-01-17 RX ADMIN — FENTANYL CITRATE 50 MCG: 50 INJECTION, SOLUTION INTRAMUSCULAR; INTRAVENOUS at 12:34

## 2023-01-17 RX ADMIN — EPHEDRINE SULFATE 10 MG: 50 INJECTION, SOLUTION INTRAVENOUS at 10:47

## 2023-01-17 RX ADMIN — DEXAMETHASONE SODIUM PHOSPHATE 10 MG: 10 INJECTION INTRAMUSCULAR; INTRAVENOUS at 10:38

## 2023-01-17 RX ADMIN — ROCURONIUM BROMIDE 50 MG: 10 INJECTION, SOLUTION INTRAVENOUS at 10:18

## 2023-01-17 RX ADMIN — LORAZEPAM 1 MG: 2 INJECTION INTRAMUSCULAR; INTRAVENOUS at 12:20

## 2023-01-17 RX ADMIN — FENTANYL CITRATE 50 MCG: 50 INJECTION INTRAMUSCULAR; INTRAVENOUS at 12:03

## 2023-01-17 NOTE — PLAN OF CARE
Problem: Nutrition/Hydration-ADULT  Goal: Nutrient/Hydration intake appropriate for improving, restoring or maintaining nutritional needs  Description: Monitor and assess patient's nutrition/hydration status for malnutrition  Collaborate with interdisciplinary team and initiate plan and interventions as ordered  Monitor patient's weight and dietary intake as ordered or per policy  Utilize nutrition screening tool and intervene as necessary  Determine patient's food preferences and provide high-protein, high-caloric foods as appropriate       INTERVENTIONS:  - Monitor oral intake, urinary output, labs, and treatment plans  - Assess nutrition and hydration status and recommend course of action  - Evaluate amount of meals eaten  - Assist patient with eating if necessary   - Allow adequate time for meals  - Recommend/ encourage appropriate diets, oral nutritional supplements, and vitamin/mineral supplements  - Order, calculate, and assess calorie counts as needed  - Recommend, monitor, and adjust tube feedings and TPN/PPN based on assessed needs  - Assess need for intravenous fluids  - Provide specific nutrition/hydration education as appropriate  - Include patient/family/caregiver in decisions related to nutrition  Outcome: Progressing     Problem: CARDIOVASCULAR - ADULT  Goal: Maintains optimal cardiac output and hemodynamic stability  Description: INTERVENTIONS:  - Monitor I/O, vital signs and rhythm  - Monitor for S/S and trends of decreased cardiac output  - Administer and titrate ordered vasoactive medications to optimize hemodynamic stability  - Assess quality of pulses, skin color and temperature  - Assess for signs of decreased coronary artery perfusion  - Instruct patient to report change in severity of symptoms  Outcome: Progressing  Goal: Absence of cardiac dysrhythmias or at baseline rhythm  Description: INTERVENTIONS:  - Continuous cardiac monitoring, vital signs, obtain 12 lead EKG if ordered  - Administer antiarrhythmic and heart rate control medications as ordered  - Monitor electrolytes and administer replacement therapy as ordered  Outcome: Progressing     Problem: GASTROINTESTINAL - ADULT  Goal: Minimal or absence of nausea and/or vomiting  Description: INTERVENTIONS:  - Administer IV fluids if ordered to ensure adequate hydration  - Maintain NPO status until nausea and vomiting are resolved  - Nasogastric tube if ordered  - Administer ordered antiemetic medications as needed  - Provide nonpharmacologic comfort measures as appropriate  - Advance diet as tolerated, if ordered  - Consider nutrition services referral to assist patient with adequate nutrition and appropriate food choices  Outcome: Progressing     Problem: GENITOURINARY - ADULT  Goal: Absence of urinary retention  Description: INTERVENTIONS:  - Assess patient's ability to void and empty bladder  - Monitor I/O  - Bladder scan as needed  - Discuss with physician/AP medications to alleviate retention as needed  - Discuss catheterization for long term situations as appropriate  Outcome: Progressing

## 2023-01-17 NOTE — OP NOTE
[facility]   [address] [facility], [address]  [phone] (Fax)      Operative Report  [address] [facility]     Patient Name: Leonides Tirado    :  1990  MRN: [id number]  Patient Location: [facility] OR ROOM 07  Surgery Date : 2023  Surgeons:  Surgeon(s) and Role:     * Surinder Jones MD - Primary     * Gonzales Stevens DO - Assisting     * Naohmy Turcios PA-C    Diagnosis:    Pre-Op Diagnosis Codes:  Obesity [E66 9]  Body mass index is 37 05 kg/m²  Essential hypertension [I10]  Asthma [J45 909]    Post-Op Diagnosis Codes:     * Obesity [E66 9]     * Essential hypertension [I10]     * Asthma [J45 909]      * Body mass index is 37 05 kg/m²  Procedure  1  Intraoperative Endoscopy  2  Laparoscopic Robotically Assisted Sleeve Gastrectomy    Specimen(s):  ID Type Source Tests Collected by Time Destination   1 : MD Inga 2023 1126        Estimated Blood Loss:    20 cc    Anesthesia Type:     General    Operative Indications:    Obesity [E66 9]  Essential hypertension [I10]  Asthma [J45 909]  Body mass index is 37 05 kg/m²  Operative Findings:    Normal anatomy    Complications:     None    Procedure and Technique:    INDICATION    Leonides Tirado is a 29 y o  female with a Body mass index is 37 05 kg/m²  and a long standing history of morbid obesity and inability to lose a significant amount of weight on its own  This patient was found to be a good candidate to undergo a bariatric procedure upon being enrolled here at the [address] [place] [address]  OPERATIVE TECHNIQUE    The patient was taken to the operating room and placed in a supine position  A dose of IV antibiotic prophylaxis that consisted of Ancef 2g was given  Also 40 mg of subcutaneous Enoxaparin to prevent deep vein thrombosis were administered    Sequential compression devices were placed on both lower extremities  After satisfactory general anesthesia induction and endotracheal intubation was achieved, the extremities were placed and properly secured to prevent neuromuscular damage as best as possible  Subsequently, the abdominal wall was prepped and draped in a surgical standard sterile fashion  After a timeout was done and the patient was properly identified and the type of procedure was confirmed  access to the peritoneal cavity was gained with standard Veress needle technique and an optical trocar  With this device, we were able to visualize the layers of the abdominal wall, and enter the peritoneal cavity under direct visualization  Pneumoperitoneum was then established with CO2 insufflation  A four quadrant transversus abdominis plane block was performed under direct laparoscopic vision  After this was completed four additional trocars were placed: A 12 mm in the right flank position in the midclavicular line, an 8-mm port was placed in the right flank at the anterior axillary line, an 8 mm port was placed in left flank in the midclavicular line and another 8-mm port was placed in the left flank anterior axillary line  The [facility] liver retractor was placed in the subxiphoid position through the use of a 5-mm trocar incision  The patient was repositioned to a reverse Trendelenburg position and the robot was docked    With the trocars in place, the dissection was begun  We divided the gastrocolic ligament with the energy device to enter the lesser sac  We continued to divide this ligament along the greater curvature of the stomach towards the angle of His  Special care was taken while dividing the short gastric vessels close to the spleen  This process was completely hemostatic  We then turned to the creation of an elongated and thin gastric pouch  A 36 Lao calibration tube was placed by the anesthesia staff into the stomach under our laparoscopic surveillance   Once the tip of the bougie was confirmed to be next to the pylorus, serial firings of the laparoscopic stapler with 60-mm cartridges were utilized  We started in a point inferior to the incisura angularis and the Crows foot nerve looking to preserve the gastric emptying  This was 5 to 6 centimeters proximal to the pylorus  We created a pouch based on the lesser curve, and in vertical orientation  We continued the vertical serial firings of the stapler to the angle of His gently cinching the bougie with our laparoscopic stapler looking to create a thin pouch  As we approached the fundus of the stomach and the angle of His, the stapler loads were changed appropriately according to the variable thickness of the tissue and were reinforced with buttressing material as needed  This completely  the pouch from the remnant stomach  We then turned our attention to the newly created pouch and examined it for bleeding or obvious defects on the staple lines and none were found  The distal stomach pouch was occluded and an EGD as well as an air insufflation test was performed  Neither intraoperative bleeding nor leaks were detected  I then covered the most proximal aspect of the staple line with a tongue of omentum in a Jose Miguel patch fashion and secured it in place with a single 2/0 Vicryl stitch  The robot was undocked and the 12 mm right flank trocar site was dilated and the gastric remnant was externalized through it and passed off the surgical field to be sent to pathology  The sponge, needle and instrument count was reported complete  The previously dilated trocar site was then closed with use of a suture closure device and a figure-of-eight with absorbable suture  The pneumoperitoneum was evacuated using the [facility] filter and smoke evacuator  The liver retractor and the remainder ports were then removed under direct laparoscopic visualization and no back bleeding was noted       The skin incisions were all closed with 4-0 absorbable subcuticular suture  The patient tolerated the procedure well, was extubated uneventfully and was transferred to the recovery room in stable condition  I was present for the entire length of the procedure as the attending of record  No qualified resident was available to assist   The presence of an assistant was necessary for camera holding, traction and counter traction and for help with suturing and stapling in addition to performing the intraop-EGD      Patient Disposition:    PACU     Signature: Max Corcoran MD  Date: January 17, 2023  Time: 11:37 AM

## 2023-01-17 NOTE — INTERVAL H&P NOTE
H&P reviewed  After examining the patient I find no changes in the patients condition since the H&P had been written      Vitals:    01/17/23 0800   BP: 129/73   Pulse: 93   Resp: 16   Temp: 97 8 °F (36 6 °C)   SpO2: 98%

## 2023-01-17 NOTE — ANESTHESIA POSTPROCEDURE EVALUATION
Post-Op Assessment Note    CV Status:  Stable    Pain management: adequate     Mental Status:  Alert and awake   Hydration Status:  Euvolemic   PONV Controlled:  Controlled   Airway Patency:  Patent      Post Op Vitals Reviewed: Yes      Staff: Anesthesiologist         No notable events documented      /68 (01/17/23 1245)    Temp      Pulse 72 (01/17/23 1245)   Resp 13 (01/17/23 1245)    SpO2 96 % (01/17/23 1245)

## 2023-01-17 NOTE — ANESTHESIA PREPROCEDURE EVALUATION
Procedure:  GASTRECTOMY  SLEEVE W/ ROBOT (Abdomen)    Relevant Problems   CARDIO   (+) Essential hypertension   (+) Migraine with aura and without status migrainosus, not intractable   (+) Migraine without aura and without status migrainosus, not intractable      NEURO/PSYCH   (+) Anxiety   (+) Migraine with aura and without status migrainosus, not intractable   (+) Migraine without aura and without status migrainosus, not intractable   (+) Moderate episode of recurrent major depressive disorder (HCC)   (+) Tension headache   (+) Test anxiety      PULMONARY   (+) Mild intermittent asthma without complication        Physical Exam    Airway    Mallampati score: II  TM Distance: >3 FB  Neck ROM: full     Dental   No notable dental hx     Cardiovascular  Rhythm: regular, Rate: normal, Cardiovascular exam normal    Pulmonary  Pulmonary exam normal Breath sounds clear to auscultation,     Other Findings        Anesthesia Plan  ASA Score- 3     Anesthesia Type- general with ASA Monitors  Additional Monitors:   Airway Plan: ETT  Plan Factors-Exercise tolerance (METS): >4 METS  Chart reviewed  Existing labs reviewed  Patient summary reviewed  Patient is not a current smoker  Obstructive sleep apnea risk education given perioperatively  Induction- intravenous  Postoperative Plan- Plan for postoperative opioid use  Informed Consent- Anesthetic plan and risks discussed with patient and spouse  I personally reviewed this patient with the CRNA  Discussed and agreed on the Anesthesia Plan with the CRNA  Ester Cummins

## 2023-01-18 VITALS
RESPIRATION RATE: 18 BRPM | SYSTOLIC BLOOD PRESSURE: 140 MMHG | OXYGEN SATURATION: 97 % | TEMPERATURE: 99.2 F | HEIGHT: 67 IN | HEART RATE: 88 BPM | DIASTOLIC BLOOD PRESSURE: 87 MMHG | WEIGHT: 236.55 LBS | BODY MASS INDEX: 37.13 KG/M2

## 2023-01-18 LAB
ANION GAP SERPL CALCULATED.3IONS-SCNC: 8 MMOL/L (ref 4–13)
BUN SERPL-MCNC: 9 MG/DL (ref 5–25)
CALCIUM SERPL-MCNC: 8.4 MG/DL (ref 8.4–10.2)
CHLORIDE SERPL-SCNC: 103 MMOL/L (ref 96–108)
CO2 SERPL-SCNC: 26 MMOL/L (ref 21–32)
CREAT SERPL-MCNC: 0.69 MG/DL (ref 0.6–1.3)
ERYTHROCYTE [DISTWIDTH] IN BLOOD BY AUTOMATED COUNT: 12.1 % (ref 11.6–15.1)
GFR SERPL CREATININE-BSD FRML MDRD: 117 ML/MIN/1.73SQ M
GLUCOSE SERPL-MCNC: 100 MG/DL (ref 65–140)
HCT VFR BLD AUTO: 33.2 % (ref 34.8–46.1)
HGB BLD-MCNC: 11.2 G/DL (ref 11.5–15.4)
MCH RBC QN AUTO: 31.3 PG (ref 26.8–34.3)
MCHC RBC AUTO-ENTMCNC: 33.7 G/DL (ref 31.4–37.4)
MCV RBC AUTO: 93 FL (ref 82–98)
PLATELET # BLD AUTO: 258 THOUSANDS/UL (ref 149–390)
PMV BLD AUTO: 9.9 FL (ref 8.9–12.7)
POTASSIUM SERPL-SCNC: 3.2 MMOL/L (ref 3.5–5.3)
RBC # BLD AUTO: 3.58 MILLION/UL (ref 3.81–5.12)
SODIUM SERPL-SCNC: 137 MMOL/L (ref 135–147)
WBC # BLD AUTO: 10.28 THOUSAND/UL (ref 4.31–10.16)

## 2023-01-18 RX ORDER — POTASSIUM CHLORIDE 20MEQ/15ML
40 LIQUID (ML) ORAL ONCE
Status: COMPLETED | OUTPATIENT
Start: 2023-01-18 | End: 2023-01-18

## 2023-01-18 RX ORDER — ACETAMINOPHEN 160 MG/5ML
975 SUSPENSION, ORAL (FINAL DOSE FORM) ORAL EVERY 8 HOURS
Qty: 638.4 ML | Refills: 0 | Status: SHIPPED | OUTPATIENT
Start: 2023-01-18 | End: 2023-01-25

## 2023-01-18 RX ORDER — ONDANSETRON 4 MG/1
4 TABLET, ORALLY DISINTEGRATING ORAL EVERY 6 HOURS PRN
Qty: 10 TABLET | Refills: 0 | Status: SHIPPED | OUTPATIENT
Start: 2023-01-18 | End: 2023-01-18

## 2023-01-18 RX ORDER — OXYCODONE HYDROCHLORIDE 5 MG/1
5 TABLET ORAL EVERY 4 HOURS PRN
Qty: 10 TABLET | Refills: 0 | Status: SHIPPED | OUTPATIENT
Start: 2023-01-18

## 2023-01-18 RX ORDER — OMEPRAZOLE 20 MG/1
20 CAPSULE, DELAYED RELEASE ORAL DAILY
Qty: 30 CAPSULE | Refills: 3 | Status: SHIPPED | OUTPATIENT
Start: 2023-01-18

## 2023-01-18 RX ORDER — ENOXAPARIN SODIUM 100 MG/ML
40 INJECTION SUBCUTANEOUS DAILY
Qty: 5.2 ML | Refills: 0 | Status: SHIPPED | OUTPATIENT
Start: 2023-01-18 | End: 2023-01-31

## 2023-01-18 RX ORDER — ONDANSETRON 4 MG/1
4 TABLET, ORALLY DISINTEGRATING ORAL EVERY 6 HOURS PRN
Qty: 20 TABLET | Refills: 0 | Status: SHIPPED | OUTPATIENT
Start: 2023-01-18

## 2023-01-18 RX ADMIN — OXYCODONE HYDROCHLORIDE 10 MG: 5 SOLUTION ORAL at 06:44

## 2023-01-18 RX ADMIN — BUPROPION HYDROCHLORIDE 75 MG: 75 TABLET, FILM COATED ORAL at 08:30

## 2023-01-18 RX ADMIN — POTASSIUM CHLORIDE 40 MEQ: 1.5 SOLUTION ORAL at 08:30

## 2023-01-18 RX ADMIN — ACETAMINOPHEN 975 MG: 325 TABLET ORAL at 06:02

## 2023-01-18 RX ADMIN — OXYCODONE HYDROCHLORIDE 10 MG: 5 SOLUTION ORAL at 11:33

## 2023-01-18 RX ADMIN — ONDANSETRON 4 MG: 2 INJECTION INTRAMUSCULAR; INTRAVENOUS at 06:47

## 2023-01-18 RX ADMIN — FAMOTIDINE 20 MG: 10 INJECTION, SOLUTION INTRAVENOUS at 08:30

## 2023-01-18 RX ADMIN — SODIUM CHLORIDE, SODIUM LACTATE, POTASSIUM CHLORIDE, AND CALCIUM CHLORIDE 100 ML/HR: 600; 310; 30; 20 INJECTION, SOLUTION INTRAVENOUS at 11:33

## 2023-01-18 RX ADMIN — SODIUM CHLORIDE, SODIUM LACTATE, POTASSIUM CHLORIDE, AND CALCIUM CHLORIDE 100 ML/HR: 600; 310; 30; 20 INJECTION, SOLUTION INTRAVENOUS at 01:24

## 2023-01-18 RX ADMIN — ENOXAPARIN SODIUM 40 MG: 40 INJECTION SUBCUTANEOUS at 09:34

## 2023-01-18 RX ADMIN — BUSPIRONE HYDROCHLORIDE 7.5 MG: 5 TABLET ORAL at 08:30

## 2023-01-18 NOTE — DISCHARGE SUMMARY
Discharge Summary - [place] Christina 29 y o  female MRN: [id number]    Unit/Bed#: E5 -01 Encounter: [id number]      Pre-Operative Diagnosis: Pre-Op Diagnosis Codes:     * Obesity [E66 9]     * Essential hypertension [I10]     * Asthma [J45 909]    Post-Operative Diagnosis: Post-Op Diagnosis Codes:     * Obesity [E66 9]     * Essential hypertension [I10]     * Asthma [J45 909]    Procedures Performed:  Procedure(s):  GASTRECTOMY  SLEEVE W/ ROBOT    Surgeon: Laurel Sloan MD    See H & P for full details of admission and Operative Note for full details of operations performed  Patient tolerated surgery well without complications  In the morning postoperative Day 1, the patient had mild nausea and abdominal pain  Tolerated a clear liquid diet without vomiting  Able to ambulate and voiding independently  Patient was deemed ready for discharge home on lovenox  SLIM consulted for home medication management  Patient was seen and examined prior to discharge  Provisions for Follow-Up Care:  See After Visit Summary/Discharge Instructions for information related to follow-up care and home orders  Disposition: Home, in stable condition  Planned Readmission: No    Discharge Medications:  See After Visit Summary/Discharge Instructions for reconciled discharge medications provided to patient and family  Post Operative instructions: Reviewed with patient and/or family      Signature:   Abel Desai PA-C  Date: 1/18/2023 Time: 12:31 PM

## 2023-01-18 NOTE — DISCHARGE INSTR - AVS FIRST PAGE
Bariatric/Weight Loss Surgery  Hospital Discharge Instructions  ACTIVITY:  Progress as feels comfortable - a good rule is:  if you are doing something and it begins to hurt, stop doing the activity  Walk every hour while at home  You may walk stairs if you do so slowly  You may shower 48 hours after surgery  Do not scrub incision sites  Blot gently with clean towel to dry incisions  (see #4 below)   Use your incentive spirometer 10 times per hour while awake for 1 week after surgery  Do NOT drive for 48 hours after surgery  No driving 24 hours after taking certain prescription pain medications  Examples of such medication are Percocet, Darvocet, Oxycodone, Tylenol #3, and Tylenol with Codeine  DIET  Stay on a liquid diet for 7 days after your surgery date, sipping slowly  Refer to your manual for examples of choices  Remember to keep your liquids sugar free or low calorie  You may have protein drinks  Make sure to drink 48 to 64 ounces per day of fluids  You may advance to a pureed diet one week after surgery as instructed by your diet progression pamphlet  Once you get approval from your surgeon at your first post operative visit, you may advance to the soft diet and remain on soft diet for 8 weeks unless otherwise instructed  MEDICATIONS:  The abdominal nerve block will wear off during the first 1-2 days that you are home, and you may become sore (especially over incision site/sites where abdominal wall is sutured)  This may create a pulling sensation, especially while moving around, and will fade over time  Continue to take your Tylenol and your pain medication as instructed  Start vitamins and minerals one week after surgery or when you start stage 3/puree diet  Anti-acid Medication as per prescription  Other medications as indicated on the Physician Patient Discharge Instructions form given to you at the time of discharge    Make sure that you are splitting your pill or tablet medications in halves or fourths or even crushing them before you take them  Capsules should be opened and mixed with water or jello  You need to do this for at least 4 weeks after surgery  Eventually you will be able to take your medications the regular way as they were prescribed  You will need to consult with your Family Doctor in regards to all your prescribed medication, particularly those for blood pressure and diabetes  As you lose weight, medical conditions may change, requiring an alteration or elimination of the drug dose  Monitor blood pressure closely and call PCP with any concerns  Sleeve Gastrectomy patients ONLY:  Complete full course of lovenox injections! DO NOT TAKE BIRTH CONTROL(BC) MEDICATIONS, INSERT BC VAGINAL RINGS, OR PLACE IUD OR ANY OTHER BC METHODS UNTIL 31 DAYS FROM DAY OF DISCHARGE FROM HOSPITAL  THIS PLACES YOU AT HIGH RISK FOR A POTENTIALLY LIFE THREATENING BLOOD CLOT  Remember to always use barrier methods for birth control and speak to your GYN about using two forms of birth control to start 31 days after surgery  It is very important to avoid pregnancy until at least 18-24 months after surgery  INCISION CARE  You may shower and get incisions wet 2 days after surgery  No soaking tub baths or swimming for 30 days after surgery  Keep abdominal area and incisions clean  Use soap and water to create a good lather and rinse off  Do not scrub incisions  If you have a drain, empty the drain as the nurses instructed  FOLLOW-UP APPOINTMENT should be made for one week after discharge  Call surgeon’s office at [phone] to schedule an appointment      CALL YOUR DOCTOR FOR:  pain not controlled by pain medications, a temperature greater than 101 5° F, any increase or change in drainage or redness from any incision, any vomiting or inability to keep liquids down, shortness of breath, shoulder pain, or bleeding

## 2023-01-18 NOTE — PROGRESS NOTES
Progress Note - Bariatric Surgery   Quinn Loomis 29 y o  female MRN: [id number]  Unit/Bed#: E5 -01 Encounter: [id number]      Subjective/Objective     Subjective:  Patient was seen and evaluated this morning at bedside  Patient is POD1 s/p Robotic Sleeve Gastrectomy  Patient denies fevers, chills, sweats, SOB, CP, calf pain  Pain adequately controlled on oral pain medication  Ambulating without assistance, voiding well, and using incentive spirometer  Tolerating liquid diet without nausea or vomiting today  Objective:    /85   Pulse (!) 112   Temp 99 5 °F (37 5 °C)   Resp 20   Ht 5' 7" (1 702 m)   Wt 107 kg (236 lb 8 9 oz)   SpO2 97%   BMI 37 05 kg/m²       Intake/Output Summary (Last 24 hours) at 1/18/2023 0827  Last data filed at 1/17/2023 1501  Gross per 24 hour   Intake 3400 ml   Output 320 ml   Net 3080 ml       Invasive Devices     Peripheral Intravenous Line  Duration           Peripheral IV 01/17/23 Dorsal (posterior); Left Hand 1 day                ROS: 10-point system completed  All negative except see HPI  Physical Exam    General Appearance:    Alert, cooperative, no distress, appears stated age   Head:    Normocephalic, without obvious abnormality, atraumatic   Lungs:     Respirations unlabored   Heart:    Regular rate and rhythm   Abdomen:     Soft, appropriate tenderness, no masses, no organomegaly, non-distended   Extremities:   Extremities normal, atraumatic, no cyanosis or edema   Neurologic:  Incision:  Psych:   Normal strength and sensation    Clean, dry, and intact    Normal mood and affect       Lab, Imaging and other studies:  I have personally reviewed pertinent lab results    , CBC:   Lab Results   Component Value Date    WBC 10 28 (H) 01/18/2023    HGB 11 2 (L) 01/18/2023    HCT 33 2 (L) 01/18/2023    MCV 93 01/18/2023     01/18/2023    MCH 31 3 01/18/2023    MCHC 33 7 01/18/2023    RDW 12 1 01/18/2023    MPV 9 9 01/18/2023   , CMP:   Lab Results Component Value Date    SODIUM 137 01/18/2023    K 3 2 (L) 01/18/2023     01/18/2023    CO2 26 01/18/2023    BUN 9 01/18/2023    CREATININE 0 69 01/18/2023    CALCIUM 8 4 01/18/2023    EGFR 117 01/18/2023        VTE Mechanical Prophylaxis: sequential compression device    Assessment/Plan  1)  Patient with Morbid Obesity s/p Robotic Sleeve Gastrectomy with stable post op course  Patient afebrile and hemodynamically stable  - Encourage PO fluids  - Recommend ambulation, use of SCDs when not ambulating, and incentive spirometry  - May give Lovenox   - Multimodal pain control  - Plan to D/C patient home today pending anticipated progression    Plan of care was discussed with patient    Care plan discussed with Dr Pb Rolon DO  Bariatric Surgery Fellow  1/18/2023  8:27 AM

## 2023-01-18 NOTE — NURSING NOTE
Agree with previous RN assessment  Pt compliant with medications and treatments  Call bell and kotas within reach  Will continue to monitor

## 2023-01-18 NOTE — UTILIZATION REVIEW
Initial Clinical Review    Elective inpatient surgical procedure  Age/Sex: [place] y o  female  Surgery Date:01-17-23  1  Procedure: Intraoperative Endoscopy  Laparoscopic Robotically Assisted Sleeve Gastrectomy  Anesthesia: General  Operative Findings: Normal anatomy    POD#1 Progress Note: 01-18-23  Pain adequately controlled on oral pain medication  Ambulating without assistance, voiding well, and using incentive spirometer    Tolerating liquid diet without nausea or vomiting today          Admission Orders: Date/Time/Statement:   Admission Orders (From admission, onward)     Ordered        01/17/23 1117  Inpatient Admission  Once                      Orders Placed This Encounter   Procedures   • Inpatient Admission     Standing Status:   Standing     Number of Occurrences:   1     Order Specific Question:   Level of Care     Answer:   Med Surg [16]     Order Specific Question:   Estimated length of stay     Answer:   Inpatient Only Surgery     Vital Signs: /87   Pulse 88   Temp 99 2 °F (37 3 °C)   Resp 18   Ht 5' 7" (1 702 m)   Wt 107 kg (236 lb 8 9 oz)   SpO2 97%   BMI 37 05 kg/m²     Pertinent Labs/Diagnostic Test Results:   No orders to display         Results from last 7 days   Lab Units 01/18/23  0618   WBC Thousand/uL 10 28*   HEMOGLOBIN g/dL 11 2*   HEMATOCRIT % 33 2*   PLATELETS Thousands/uL 258         Results from last 7 days   Lab Units 01/18/23  0618   SODIUM mmol/L 137   POTASSIUM mmol/L 3 2*   CHLORIDE mmol/L 103   CO2 mmol/L 26   ANION GAP mmol/L 8   BUN mg/dL 9   CREATININE mg/dL 0 69   EGFR ml/min/1 73sq m 117   CALCIUM mg/dL 8 4     Results from last 7 days   Lab Units 01/18/23  0618   GLUCOSE RANDOM mg/dL 100         Diet: bariatric clear liqiud  Mobility: oob   DVT Prophylaxis: scd  lovenox     Medications/Pain Control:   Scheduled Medications:  acetaminophen, 975 mg, Oral, Q8H   Or  acetaminophen, 975 mg, Oral, Q8H  buPROPion, 75 mg, Oral, BID  busPIRone, 7 5 mg, Oral, BID  enoxaparin, 40 mg, Subcutaneous, Q24H  famotidine, 20 mg, Intravenous, BID  scopolamine, 1 patch, Transdermal, Once  sertraline, 200 mg, Oral, Daily      Continuous IV Infusions:     PRN Meds:  albuterol, 2 puff, Inhalation, Q6H PRN  ALPRAZolam, 0 5 mg, Oral, BID PRN  diphenhydrAMINE, 25 mg, Oral, Q8H PRN  methocarbamol, 500 mg, Oral, HS PRN  metoclopramide, 10 mg, Intravenous, Q6H PRN  X 1  morphine injection, 4 mg, Intravenous, Q4H PRN   X 1  ondansetron, 4 mg, Intravenous, Q6H PRN  X 2  oxyCODONE, 10 mg, Oral, Q4H PRN  X 5   oxyCODONE, 5 mg, Oral, Q4H PRN  phenol, 2 spray, Mouth/Throat, Q2H PRN  promethazine, 25 mg, Intravenous, Q6H PRN  simethicone, 80 mg, Oral, 4x Daily PRN  zolpidem, 5 mg, Oral, HS PRN        Network Utilization Review Department  ATTENTION: Please call with any questions or concerns to [phone] and carefully listen to the prompts so that you are directed to the right person  All voicemails are confidential   Vannessa Milton all requests for admission clinical reviews, approved or denied determinations and any other requests to dedicated fax number below belonging to the campus where the patient is receiving treatment   List of dedicated fax numbers for the Facilities:  [address] DENIALS (Administrative/Medical Necessity) [phone]   [address] (Maternity/NICU/Pediatrics) [phone]   [address] [phone]   [facility] [address] [phone]   [address] [place] [id number] [place] [address] Emmanuel Townsend 28 [phone]   [facility] [phone]   [address] [phone]

## 2023-01-18 NOTE — UTILIZATION REVIEW
NOTIFICATION OF INPATIENT ADMISSION   AUTHORIZATION REQUEST   SERVICING FACILITY:   [address] [place] [address] [facility]  Tax ID: [id number]  NPI: [id number] ATTENDING PROVIDER:  Attending Name and NPI#: Tamia Benton [[id number]]  Address: [address] [place] [address] [address] [facility]  Phone: [phone]     ADMISSION INFORMATION:  Place of Service: [place] [address]  Place of Service Code: 21  Inpatient Admission Date/Time: 1/17/23 11:17 AM  Discharge Date/Time: No discharge date for patient encounter  Admitting Diagnosis Code/Description:  Obesity [E66 9]  Essential hypertension [I10]  Asthma [J45 909]     UTILIZATION REVIEW CONTACT:  Piotr Herrera Utilization   Network Utilization Review Department  Phone: [phone]  Fax: [phone]  Email: [email or web address]  Contact for approvals/pending authorizations, clinical reviews, and discharge  PHYSICIAN ADVISORY SERVICES:  Medical Necessity Denial & [id number]-[id number]-Xycf Review  Phone: [phone]  Fax: [phone]  Email: [email or web address]

## 2023-01-18 NOTE — PLAN OF CARE
Problem: Nutrition/Hydration-ADULT  Goal: Nutrient/Hydration intake appropriate for improving, restoring or maintaining nutritional needs  Description: Monitor and assess patient's nutrition/hydration status for malnutrition  Collaborate with interdisciplinary team and initiate plan and interventions as ordered  Monitor patient's weight and dietary intake as ordered or per policy  Utilize nutrition screening tool and intervene as necessary  Determine patient's food preferences and provide high-protein, high-caloric foods as appropriate       INTERVENTIONS:  - Monitor oral intake, urinary output, labs, and treatment plans  - Assess nutrition and hydration status and recommend course of action  - Evaluate amount of meals eaten  - Assist patient with eating if necessary   - Allow adequate time for meals  - Recommend/ encourage appropriate diets, oral nutritional supplements, and vitamin/mineral supplements  - Order, calculate, and assess calorie counts as needed  - Recommend, monitor, and adjust tube feedings and TPN/PPN based on assessed needs  - Assess need for intravenous fluids  - Provide specific nutrition/hydration education as appropriate  - Include patient/family/caregiver in decisions related to nutrition  Outcome: Progressing     Problem: CARDIOVASCULAR - ADULT  Goal: Maintains optimal cardiac output and hemodynamic stability  Description: INTERVENTIONS:  - Monitor I/O, vital signs and rhythm  - Monitor for S/S and trends of decreased cardiac output  - Administer and titrate ordered vasoactive medications to optimize hemodynamic stability  - Assess quality of pulses, skin color and temperature  - Assess for signs of decreased coronary artery perfusion  - Instruct patient to report change in severity of symptoms  Outcome: Progressing     Problem: GASTROINTESTINAL - ADULT  Goal: Minimal or absence of nausea and/or vomiting  Description: INTERVENTIONS:  - Administer IV fluids if ordered to ensure adequate hydration  - Maintain NPO status until nausea and vomiting are resolved  - Nasogastric tube if ordered  - Administer ordered antiemetic medications as needed  - Provide nonpharmacologic comfort measures as appropriate  - Advance diet as tolerated, if ordered  - Consider nutrition services referral to assist patient with adequate nutrition and appropriate food choices  Outcome: Progressing     Problem: GENITOURINARY - ADULT  Goal: Absence of urinary retention  Description: INTERVENTIONS:  - Assess patient's ability to void and empty bladder  - Monitor I/O  - Bladder scan as needed  - Discuss with physician/AP medications to alleviate retention as needed  - Discuss catheterization for long term situations as appropriate  Outcome: Progressing

## 2023-01-18 NOTE — NURSING NOTE
Agree with previous RN assessment  Pt resting in bed, c/o pain, medicated for pain  Call bell and belongings within reach  Will continue to monitor

## 2023-01-18 NOTE — CONSULTS
Inpatient Medical Consultation - [facility] Internal Medicine    Patient Information: Kia Jarrett 29 y o  female MRN: [id number]  Unit/Bed#: E5 -01 Encounter: [id number]  PCP: ELAINE Fernández  Date of Admission:  1/17/2023  Date of Consultation: 01/18/23  Requesting Physician: Venkat Forte MD    Reason For Consultation:   postoperative medical management    Assessment/Plan:    · Morbid obesity/BMI 37 05  Status post elective laparoscopic robotically assisted sleeve gastrectomy with intraoperative EGD  Procedure was performed by Dr Adan Banegas on 1/17/2023  Postoperative day #1  Counseled on need to avoid NSAIDs    · Hypokalemia: K 3 2  Repleted here with 40 meq K  · Hypertension: Home regimen lisinopril 10 mg daily  Patient's blood pressures are controlled here  Patient may stop this medication upon discharge  She should follow-up with PCP in 1 week's time for blood pressure check and take a blood pressure log with her  · Asthma: Continue home inhalers  Stable  No acute exacerbation  · Anxiety/depression: Maintained on Wellbutrin 75 mg twice daily, BuSpar 7 5 mg twice daily, sertraline 200 mg daily  Takes xanax prn as well  Prior to patient having bariatric surgery, she ensured that her medications were no longer extended release formulations  She may continue her current meds upon discharge  · History of migraines: maintained on Emgality injections  Also takes zomig prn and robaxin prn      VTE Prophylaxis: Enoxaparin (Lovenox)  / sequential compression device     Counseling / Coordination of Care Time: 30 minutes  Greater than 50% of total time spent on patient counseling and coordination of care  History of Present Illness:    Kia Jarrett is a 29 y o  female who is originally admitted to the bariatric service on 1/17/2023 due to inability to lose meaningful sustainable weight loss to conservative measures    Patient presented for elective laparoscopic sleeve gastrectomy  Patient tolerated the procedure well with no known complications and minimal blood loss    We are consulted for postoperative medical comanagement  Patient has a past medical history of asthma, essential hypertension, anxiety/depression, history of migraines  Did not need to lose any weight prior to surgery given her current weight  Post operatively, she is doing well however is having some nausea with the clear liquids  She so far has tolerated water  She denies any vomiting  She is belching  She has gotten up out of bed and is urinating  She has not passed any gas or had a bowel movement yet  She denies any chest pain, chest pressure  Denies smoking or drinking  Review of Systems:    Review of Systems   Constitutional: Negative for chills and fever  HENT: Negative for congestion  Eyes: Negative for discharge  Respiratory: Negative for chest tightness and shortness of breath  Cardiovascular: Negative for chest pain, palpitations and leg swelling  Gastrointestinal: Positive for nausea  Negative for vomiting  Genitourinary: Negative for dysuria  Musculoskeletal: Negative for arthralgias  Neurological: Negative for dizziness  Psychiatric/Behavioral: Negative for agitation         Past Medical and Surgical History:     Past Medical History:   Diagnosis Date   • Allergic    • Anxiety    • Asthma    • Depression    • FHx: migraine headaches    • Headache(784 0)    • High blood pressure    • Hypertension    • Overdose of drug/medicinal substance, undetermined intent, initial encounter 08/31/2021    Accidental       Past Surgical History:   Procedure Laterality Date   • DILATION AND CURETTAGE OF UTERUS  2017   • EGD     • DC LAPS GSTRC RSTRICTIV PX LONGITUDINAL GASTRECTOMY N/A 1/17/2023    Procedure: GASTRECTOMY  SLEEVE W/ ROBOT;  Surgeon: Ernie Mary MD;  Location: [facility] OR;  Service: Bariatrics   • WISDOM TOOTH EXTRACTION      no anesthesia       Meds/Allergies:    all medications and allergies reviewed    Allergies: Allergies   Allergen Reactions   • Clarithromycin Hives   • Tetracyclines & Related Hives       Social History:     Marital Status:     Substance Use History:   Social History     Substance and Sexual Activity   Alcohol Use Yes    Comment: 2 x week     Social History     Tobacco Use   Smoking Status Never   Smokeless Tobacco Never     Social History     Substance and Sexual Activity   Drug Use No       Family History:    non-contributory    Physical Exam:     Vitals:   Blood Pressure: 140/87 (01/18/23 1127)  Pulse: 88 (01/18/23 0935)  Temperature: 99 2 °F (37 3 °C) (01/18/23 1127)  Temp Source: Temporal (01/17/23 0800)  Respirations: 18 (01/18/23 1127)  Height: 5' 7" (170 2 cm) (01/17/23 0803)  Weight - Scale: 107 kg (236 lb 8 9 oz) (01/17/23 0803)  SpO2: 97 % (01/17/23 1513)    Physical Exam  Vitals and nursing note reviewed  Constitutional:       General: She is not in acute distress  Appearance: Normal appearance  She is obese  She is not ill-appearing, toxic-appearing or diaphoretic  HENT:      Head: Normocephalic and atraumatic  Eyes:      General: No scleral icterus  Cardiovascular:      Rate and Rhythm: Normal rate and regular rhythm  Pulmonary:      Effort: Pulmonary effort is normal  No respiratory distress  Breath sounds: Normal breath sounds  No stridor  No wheezing or rhonchi  Abdominal:      General: There is no distension  Palpations: There is no mass  Tenderness: There is no abdominal tenderness  Hernia: No hernia is present  Comments: Abdominal incisions clean dry and intact   Musculoskeletal:         General: No swelling  Cervical back: Neck supple  Skin:     General: Skin is warm and dry  Neurological:      Mental Status: She is oriented to person, place, and time  Mental status is at baseline     Psychiatric:         Mood and Affect: Mood normal          Behavior: Behavior normal  Additional Data:     Lab Results: I have personally reviewed pertinent reports  Results from last 7 days   Lab Units 01/18/23  0618   WBC Thousand/uL 10 28*   HEMOGLOBIN g/dL 11 2*   HEMATOCRIT % 33 2*   PLATELETS Thousands/uL 258     Results from last 7 days   Lab Units 01/18/23  0618   POTASSIUM mmol/L 3 2*   CHLORIDE mmol/L 103   CO2 mmol/L 26   BUN mg/dL 9   CREATININE mg/dL 0 69   CALCIUM mg/dL 8 4           Imaging: I have personally reviewed pertinent reports  No results found  ** Please Note: This note has been constructed using a voice recognition system   **

## 2023-01-18 NOTE — DISCHARGE INSTRUCTIONS
your medications from [address] in [facility] [address] or cut your pills and open capsules, mix with liquid to drink  Take Tylenol every 8 hours around the clock, unless instructed otherwise  Take your omeprazole daily  It is important to stay hydrated and follow your discharge diet progression   Mild nausea is ok as long as you can drink fluids, sip very slowly and get up and walk during any periods of nausea  You may shower normally after 48 hours, but do not scrub incision sites, blot gently with clean towel to dry incisions  Take home medications as usual unless instructed otherwise while in hospital  Follow up with Dr Jose Daniel Ridley and your PCP within the next week  Sleeve gastrectomy patients ONLY: Complete full course of lovenox injections!

## 2023-01-19 ENCOUNTER — TELEPHONE (OUTPATIENT)
Dept: INTERNAL MEDICINE CLINIC | Facility: OTHER | Age: 33
End: 2023-01-19

## 2023-01-19 ENCOUNTER — TRANSITIONAL CARE MANAGEMENT (OUTPATIENT)
Dept: INTERNAL MEDICINE CLINIC | Facility: OTHER | Age: 33
End: 2023-01-19

## 2023-01-19 ENCOUNTER — TELEPHONE (OUTPATIENT)
Dept: MEDSURG UNIT | Facility: HOSPITAL | Age: 33
End: 2023-01-19

## 2023-01-19 NOTE — TELEPHONE ENCOUNTER
Post op follow up phone call completed  Pt is sipping liquids and has consumed about 32 oz so far today  Using IS as instructed, reinforced importance of using IS to help prevent pneumonia  Ambulating about home without difficulty  Pain managed with analgesia  Reaffirmed examples of liquid diet over the next week  Started miralax, passing gas, no BM as of yet  Pt stated understanding about discharge instructions and medication adjustments  Tolerating self administration of Lovenox injections without difficulty  Follow up appt with surgeon scheduled for next week  Instructed to call with any additional questions or concerns

## 2023-01-25 ENCOUNTER — OFFICE VISIT (OUTPATIENT)
Dept: BARIATRICS | Facility: CLINIC | Age: 33
End: 2023-01-25

## 2023-01-25 ENCOUNTER — TELEMEDICINE (OUTPATIENT)
Dept: INTERNAL MEDICINE CLINIC | Facility: OTHER | Age: 33
End: 2023-01-25

## 2023-01-25 VITALS
WEIGHT: 224 LBS | HEART RATE: 84 BPM | HEIGHT: 67 IN | DIASTOLIC BLOOD PRESSURE: 76 MMHG | SYSTOLIC BLOOD PRESSURE: 124 MMHG | BODY MASS INDEX: 35.16 KG/M2 | TEMPERATURE: 98.3 F

## 2023-01-25 VITALS
DIASTOLIC BLOOD PRESSURE: 77 MMHG | BODY MASS INDEX: 35.16 KG/M2 | WEIGHT: 224 LBS | SYSTOLIC BLOOD PRESSURE: 120 MMHG | HEIGHT: 67 IN

## 2023-01-25 DIAGNOSIS — Z98.84 BARIATRIC SURGERY STATUS: Primary | ICD-10-CM

## 2023-01-25 DIAGNOSIS — R11.2 POST-OPERATIVE NAUSEA AND VOMITING: ICD-10-CM

## 2023-01-25 DIAGNOSIS — E66.01 CLASS 2 SEVERE OBESITY DUE TO EXCESS CALORIES WITH SERIOUS COMORBIDITY AND BODY MASS INDEX (BMI) OF 36.0 TO 36.9 IN ADULT (HCC): Primary | ICD-10-CM

## 2023-01-25 DIAGNOSIS — Z98.890 POST-OPERATIVE NAUSEA AND VOMITING: ICD-10-CM

## 2023-01-25 RX ORDER — ONDANSETRON 4 MG/1
4 TABLET, FILM COATED ORAL EVERY 8 HOURS PRN
Qty: 20 TABLET | Refills: 0 | Status: SHIPPED | OUTPATIENT
Start: 2023-01-25

## 2023-01-25 NOTE — PROGRESS NOTES
POST OP FOLLOW UP VISIT - BARIATRIC SURGERY  Joey Hidalgo 29 y o  female MRN: [id number]  Unit/Bed#:  Encounter: [id number]      HPI:  Joey Hidalgo is a 29 y o  female status post robotic laparoscopic sleeve gastrectomy performed by Dr Sagar Mae on 1/17/22 returning to office for first post op visit since surgery  Tolerating diet  Denies nausea and vomiting  Taking multivitamins and PPI daily  Administering lovenox injections       Review of Systems  Denies chest pain, SOB, v/d, fevers, chills, headaches, blurred vision  Having some nausea periodically- controlled with zofran    Historical Information   Past Medical History:   Diagnosis Date   • Allergic    • Anxiety    • Asthma    • Depression    • FHx: migraine headaches    • Headache(784 0)    • High blood pressure    • Hypertension    • Overdose of drug/medicinal substance, undetermined intent, initial encounter 08/31/2021    Accidental     Past Surgical History:   Procedure Laterality Date   • DILATION AND CURETTAGE OF UTERUS  2017   • EGD     • [place] LAPS GSTRC RSTRICTIV PX LONGITUDINAL GASTRECTOMY N/A 1/17/2023    Procedure: GASTRECTOMY  SLEEVE W/ ROBOT;  Surgeon: Sangeeta Terrazas MD;  Location: [facility] OR;  Service: Bariatrics   • WISDOM TOOTH EXTRACTION      no anesthesia     Social History   Social History     Substance and Sexual Activity   Alcohol Use Yes    Comment: 2 x week     Social History     Substance and Sexual Activity   Drug Use No     Social History     Tobacco Use   Smoking Status Never   Smokeless Tobacco Never     Family History:   Family History   Problem Relation Age of Onset   • Hypertension Mother    • Atrial fibrillation Mother    • Stroke Mother    • Depression Mother    • Hypertension Father    • Stroke Maternal Grandmother    • Hypertension Maternal Grandmother    • Atrial fibrillation Maternal Grandmother    • Stroke Maternal Grandfather    • Hypertension Maternal Grandfather    • Heart disease Maternal Grandfather    • Hypertension Paternal Grandfather    • Thyroid disease Neg Hx    • Diabetes Neg Hx        Meds/Allergies   all medications and allergies reviewed  Allergies   Allergen Reactions   • Clarithromycin Hives   • Tetracyclines & Related Hives       Objective       Current Vitals:   Blood Pressure: 124/76 (01/25/23 1051)  Pulse: 84 (01/25/23 1051)  Temperature: 98 3 °F (36 8 °C) (01/25/23 1051)  Temp Source: Tympanic (01/25/23 1051)  Height: 5' 6 5" (168 9 cm) (01/25/23 1051)  Weight - Scale: 102 kg (224 lb) (01/25/23 1051)      Invasive Devices     None                 Physical Exam  Awake, alert, oriented, no acute distress  Normal work of breathing  Regular rate  Abd soft, appropriately tender, incisions clean, dry and intact  Moves all extremities, normal strength and ROM  No edema  Normal affect      Assessment/PLAN:    29 y o  female status post robotic laparoscopic sleeve gastrectomy done on 1/17/22 by Dr Pierre Score, doing well post op  No major issues and healing well  Did report some nausea    The pathology report was reviewed with the patient and the results were within normal limits  Pathology, and Other Studies: I have personally reviewed pertinent reports  pathology report:    Lab Results   Component Value Date    FINALDX  01/17/2023     A  Portion of Stomach, Sleeve Gastrectomy:  - Segment of full-thickness stomach with mild chronic inactive gastritis  - No Helicobacter pylori identified on H&E stained slide    - Negative for intestinal metaplasia, dysplasia or carcinoma  Increase physical activity slowly as tolerated and instructed  Advance diet as instructed by our dietitians today and as indicated in the binder  Continue Lovenox prophylaxis until completed  Continue PPI    Follow up in one month as scheduled          Randi Estevez MD  Bariatric Surgery Fellow   1/25/2023  11:22 AM

## 2023-01-25 NOTE — ASSESSMENT & PLAN NOTE
- s/p gastrectomy sleeve on 1/17 by Dr Adan Banegas  - healing well  - nausea controlled with zofran  - continue diet per GI instructions  - will defer post-op management and diet to GI

## 2023-01-25 NOTE — PROGRESS NOTES
Virtual TCM Visit:    Verification of patient location:    Patient is located in the following state in which I hold an active license [place]    Assessment/Plan:        Problem List Items Addressed This Visit        Other    Class 2 severe obesity due to excess calories with serious comorbidity and body mass index (BMI) of 36 0 to 36 9 in [facility]) - Primary     - s/p gastrectomy sleeve on 1/17 by Dr Gidlardo Min  - healing well  - nausea controlled with zofran  - continue diet per GI instructions  - will defer post-op management and diet to GI             Reason for visit is TCM    Encounter provider ELAINE Villanueva     Provider located at [address] [place] [phone]    Recent Visits  Date Type Provider Dept   01/19/23 Telephone Diana He [facility]   Showing recent visits within past 7 days and meeting all other requirements  Today's Visits  Date Type Provider Dept   01/25/23 ELAINE Garcia [facility]   Showing today's visits and meeting all other requirements  Future Appointments  No visits were found meeting these conditions  Showing future appointments within next 150 days and meeting all other requirements       After connecting through [email or web address], the patient was identified by name and date of birth  Leonides Tirado was informed that this is a telemedicine visit and that the visit is being conducted through the [employer]  She agrees to proceed     My office door was closed  No one else was in the room  She acknowledged consent and understanding of privacy and security of the video platform  The patient has agreed to participate and understands they can discontinue the visit at any time  Patient is aware this is a billable service      Transitional Care Management Review:  Estrellita Anderson Melissa Chavira is a 29 y o  female here for TCM follow up  During the TCM phone call patient stated:    TCM Call     Date and time call was made  1/19/2023 10:43 AM    Hospital care reviewed  Records reviewed    Patient was hospitialized at  [address] [id number]    Date of Admission  01/17/23    Date of discharge  01/18/23    Diagnosis  class 2 severe obseity due to excess calories with serious comorbity and BMI of 36 0 to 36 9 in adult, gastrectemy sleeve with robot    Disposition  Home    Current Symptoms  Headache; Fatigue; Incisional pain; Neausea      TCM Call     [facility] issues  None    Scheduled for follow up? Yes    Did you obtain your prescribed medications  Yes    Do you need help managing your prescriptions or medications  No    Is transportation to your appointment needed  No    I have advised the patient to call PCP with any new or worsening symptoms  Topher He CMA        Subjective:     Patient ID: Panfilo Moura is a 29 y o  female  HPI     Patient presents today for transition of care management from her recent [facility] from 1/17-1/18 through the service of Dr Ayaz Krueger for an elective gastrectomy sleeve  Specialization reviewed, surgery went as planned without complications  Followed up with her gastroenterologist today and is doing well  He is currently on a puréed diet  Nausea is controlled with Zofran  He states she is struggling to get in her liquids but has been working with making some changes to get more liquids in  She reports her incisions are healing well  She has no concerns for me today  She is seeing infectious disease in preparation for her upcoming travel to [facility] in April  Review of Systems   Constitutional: Positive for fatigue  Negative for chills and fever  Gastrointestinal: Positive for nausea  Negative for abdominal pain and vomiting  Skin: Positive for wound (incisions healing well per patient)  Psychiatric/Behavioral: The patient is nervous/anxious  Objective:    Vitals:    01/25/23 1353   BP: 120/77   BP Location: Left arm   Patient Position: Sitting   Cuff Size: Adult   Weight: 102 kg (224 lb)   Height: 5' 7" (1 702 m)       Physical Exam  Vitals reviewed  Constitutional:       General: She is not in acute distress  Appearance: Normal appearance  She is obese  She is not diaphoretic  HENT:      Head: Normocephalic and atraumatic  Pulmonary:      Effort: Pulmonary effort is normal  No respiratory distress  Neurological:      Mental Status: She is alert and oriented to person, place, and time  Mental status is at baseline  Psychiatric:         Mood and Affect: Mood normal          Behavior: Behavior normal          Thought Content: Thought content normal          Judgment: Judgment normal          Medications have been reviewed by provider in current encounter    I spent 5 minutes with the patient during this visit  ELAINE Preciado      VIRTUAL VISIT DISCLAIMER    Yanci Stratton verbally agrees to participate in [employer]  Pt is aware that [employer] could be limited without vital signs or the ability to perform a full hands-on physical Lilli Look understands she or the provider may request at any time to terminate the video visit and request the patient to seek care or treatment in person

## 2023-01-25 NOTE — PROGRESS NOTES
Weight Management Nutrition Class     Diagnosis: Obesity    Bariatric Surgeon: Dr Perez Sic    Surgery: Vertical Sleeve Gastrectomy    Class: first post op note    Topics discussed today include:     fluid goals post op, protein goals post op, constipation, chew food well, exercise, diet progression, hypoglycemia, dumping syndrome, protein supplems, vitamin/mineral supplements, calcium supplements, additional vitamin B12, iron supplements and fat soluble vitamins     Patient was able to verbalize basic diet (protein, fluid, vitamin and mineral) recommendations and possible nutrition-related complications   Yes

## 2023-02-09 ENCOUNTER — OFFICE VISIT (OUTPATIENT)
Dept: BARIATRICS | Facility: CLINIC | Age: 33
End: 2023-02-09

## 2023-02-09 VITALS
SYSTOLIC BLOOD PRESSURE: 116 MMHG | DIASTOLIC BLOOD PRESSURE: 68 MMHG | TEMPERATURE: 96.6 F | BODY MASS INDEX: 34.14 KG/M2 | HEART RATE: 84 BPM | HEIGHT: 67 IN | WEIGHT: 217.5 LBS

## 2023-02-09 DIAGNOSIS — Z48.89 ENCOUNTER FOR POST SURGICAL WOUND CHECK: Primary | ICD-10-CM

## 2023-02-09 DIAGNOSIS — Z98.84 BARIATRIC SURGERY STATUS: ICD-10-CM

## 2023-02-09 NOTE — PROGRESS NOTES
Assessment/Plan:    Incision site reviewed and assess with Dr Indigo Aponte in office  Fascia suture puckering  Continue to monitor for now  If no improvement in the next few weeks, she will return back to the office to discuss possible re-opening of incision site  No problem-specific Assessment & Plan notes found for this encounter  Diagnoses and all orders for this visit:    Encounter for post surgical wound check    Bariatric surgery status          Subjective:      Patient ID: Ninfa Soliz is a 29 y o  female  HPI     Ninfa Soliz is a 29 y o  female status post robotic laparoscopic sleeve gastrectomy performed by Dr Indigo Aponte on 1/17/22 here for incision check  Complains of right lateral mid incision feels tugging, "the suture is tugging my fascia " Feels associated pain dependent on certain body positions or movement  Denies fever or chills, drainage from incision site  The following portions of the patient's history were reviewed and updated as appropriate: allergies, current medications, past family history, past medical history, past social history, past surgical history and problem list     Review of Systems   Constitutional: Negative  Skin: Positive for wound (incision of right lateral side painful with certain body movements  )  Objective:       /68   Pulse 84   Temp (!) 96 6 °F (35 9 °C) (Tympanic)   Ht 5' 6 5" (1 689 m)   Wt 98 7 kg (217 lb 8 oz)   BMI 34 58 kg/m²          Physical Exam  Skin:     General: Skin is warm and dry  Comments: Incision of right mid-lateral region  - clean, dry, intact  Appearance of dimpling however no signs of infection

## 2023-02-22 ENCOUNTER — CLINICAL SUPPORT (OUTPATIENT)
Dept: BARIATRICS | Facility: CLINIC | Age: 33
End: 2023-02-22

## 2023-02-22 DIAGNOSIS — Z98.84 BARIATRIC SURGERY STATUS: ICD-10-CM

## 2023-02-22 DIAGNOSIS — E66.9 OBESITY, CLASS I, BMI 30-34.9: Primary | ICD-10-CM

## 2023-02-22 NOTE — PROGRESS NOTES
Weight Management Nutrition Class     Diagnosis: Morbid Obesity    Bariatric Surgeon: Dr Shannan Cody    Surgery: Vertical Sleeve Gastrectomy    Class: 5 week post op     Topics discussed today include:     fluid goals post op, protein goals post op, constipation, chew food well, exercise, avoidance of alcohol, PPI use, diet progression, hypoglycemia, dumping syndrome, protein supplems, vitamin/mineral supplements and calcium supplements    Patient was able to verbalize basic diet (protein, fluid, vitamin and mineral) recommendations and possible nutrition-related complications   Yes

## 2023-05-11 ENCOUNTER — OFFICE VISIT (OUTPATIENT)
Dept: INTERNAL MEDICINE CLINIC | Facility: CLINIC | Age: 33
End: 2023-05-11

## 2023-05-11 ENCOUNTER — OFFICE VISIT (OUTPATIENT)
Dept: BARIATRICS | Facility: CLINIC | Age: 33
End: 2023-05-11

## 2023-05-11 VITALS
OXYGEN SATURATION: 98 % | DIASTOLIC BLOOD PRESSURE: 70 MMHG | SYSTOLIC BLOOD PRESSURE: 110 MMHG | HEART RATE: 71 BPM | HEIGHT: 67 IN | WEIGHT: 189.4 LBS | TEMPERATURE: 98.2 F | BODY MASS INDEX: 29.73 KG/M2

## 2023-05-11 VITALS
SYSTOLIC BLOOD PRESSURE: 110 MMHG | TEMPERATURE: 98 F | DIASTOLIC BLOOD PRESSURE: 70 MMHG | BODY MASS INDEX: 29.51 KG/M2 | WEIGHT: 188 LBS | HEART RATE: 90 BPM | HEIGHT: 67 IN

## 2023-05-11 DIAGNOSIS — Z98.84 STATUS POST LAPAROSCOPIC SLEEVE GASTRECTOMY: Primary | ICD-10-CM

## 2023-05-11 DIAGNOSIS — I10 ESSENTIAL HYPERTENSION: ICD-10-CM

## 2023-05-11 DIAGNOSIS — H61.22 IMPACTED CERUMEN, LEFT EAR: ICD-10-CM

## 2023-05-11 DIAGNOSIS — B00.1 HERPES LABIALIS: ICD-10-CM

## 2023-05-11 DIAGNOSIS — K91.2 POSTSURGICAL MALABSORPTION: ICD-10-CM

## 2023-05-11 DIAGNOSIS — J45.20 MILD INTERMITTENT ASTHMA WITHOUT COMPLICATION: ICD-10-CM

## 2023-05-11 DIAGNOSIS — G47.09 OTHER INSOMNIA: ICD-10-CM

## 2023-05-11 DIAGNOSIS — Z98.84 STATUS POST LAPAROSCOPIC SLEEVE GASTRECTOMY: ICD-10-CM

## 2023-05-11 DIAGNOSIS — F41.9 ANXIETY: Primary | ICD-10-CM

## 2023-05-11 DIAGNOSIS — F33.1 MODERATE EPISODE OF RECURRENT MAJOR DEPRESSIVE DISORDER (HCC): ICD-10-CM

## 2023-05-11 DIAGNOSIS — E66.9 OBESITY, CLASS II, BMI 35-39.9: ICD-10-CM

## 2023-05-11 PROBLEM — E66.812 CLASS 2 SEVERE OBESITY DUE TO EXCESS CALORIES WITH SERIOUS COMORBIDITY AND BODY MASS INDEX (BMI) OF 36.0 TO 36.9 IN ADULT (HCC): Status: RESOLVED | Noted: 2022-05-03 | Resolved: 2023-05-11

## 2023-05-11 PROBLEM — E66.812 OBESITY, CLASS II, BMI 35-39.9: Status: RESOLVED | Noted: 2022-08-18 | Resolved: 2023-05-11

## 2023-05-11 PROBLEM — U07.1 COVID-19 VIRUS INFECTION: Status: RESOLVED | Noted: 2021-11-18 | Resolved: 2023-05-11

## 2023-05-11 PROBLEM — F41.8 TEST ANXIETY: Status: RESOLVED | Noted: 2019-03-26 | Resolved: 2023-05-11

## 2023-05-11 PROBLEM — E66.01 CLASS 2 SEVERE OBESITY DUE TO EXCESS CALORIES WITH SERIOUS COMORBIDITY AND BODY MASS INDEX (BMI) OF 36.0 TO 36.9 IN ADULT (HCC): Status: RESOLVED | Noted: 2022-05-03 | Resolved: 2023-05-11

## 2023-05-11 RX ORDER — OMEPRAZOLE 20 MG/1
20 CAPSULE, DELAYED RELEASE ORAL DAILY
Qty: 90 CAPSULE | Refills: 0 | Status: SHIPPED | OUTPATIENT
Start: 2023-05-11

## 2023-05-11 RX ORDER — VALACYCLOVIR HYDROCHLORIDE 500 MG/1
500 TABLET, FILM COATED ORAL DAILY
Qty: 90 TABLET | Refills: 1 | Status: SHIPPED | OUTPATIENT
Start: 2023-05-11 | End: 2023-08-09

## 2023-05-11 RX ORDER — SERTRALINE HYDROCHLORIDE 100 MG/1
200 TABLET, FILM COATED ORAL DAILY
Qty: 180 TABLET | Refills: 1 | Status: SHIPPED | OUTPATIENT
Start: 2023-05-11

## 2023-05-11 RX ORDER — BUPROPION HYDROCHLORIDE 75 MG/1
75 TABLET ORAL 2 TIMES DAILY
Qty: 180 TABLET | Refills: 1 | Status: SHIPPED | OUTPATIENT
Start: 2023-05-11

## 2023-05-11 RX ORDER — ZOLPIDEM TARTRATE 5 MG/1
5 TABLET ORAL AS NEEDED
Qty: 30 TABLET | Refills: 0 | Status: SHIPPED | OUTPATIENT
Start: 2023-05-11

## 2023-05-11 RX ORDER — BUSPIRONE HYDROCHLORIDE 7.5 MG/1
7.5 TABLET ORAL 2 TIMES DAILY
Qty: 180 TABLET | Refills: 1 | Status: SHIPPED | OUTPATIENT
Start: 2023-05-11

## 2023-05-11 RX ORDER — ALPRAZOLAM 0.5 MG/1
0.5 TABLET ORAL 2 TIMES DAILY PRN
Qty: 30 TABLET | Refills: 0 | Status: SHIPPED | OUTPATIENT
Start: 2023-05-11

## 2023-05-11 NOTE — ASSESSMENT & PLAN NOTE
- previously on lisinopril  - resolved with weight loss surgery  - continue to monitor off medication

## 2023-05-11 NOTE — PROGRESS NOTES
"  FOLLOW UP VISIT - BARIATRIC SURGERY  Sheila Mariee 29 y o  female MRN: [id number]  Unit/Bed#:  Encounter: [id number]      HPI:  Sheila Mariee is a 29 y o  female who presents status post laparoscopic sleeve gastrectomy on January 17, 2023  Review of Systems   All other systems reviewed and are negative  Historical Information   Past Medical History:   Diagnosis Date   • Allergic    • Anxiety    • Asthma    • Depression    • FHx: migraine headaches    • Headache(784 0)    • High blood pressure    • Hypertension    • Overdose of drug/medicinal substance, undetermined intent, initial encounter 08/31/2021    Accidental     Past Surgical History:   Procedure Laterality Date   • DILATION AND CURETTAGE OF UTERUS  2017   • EGD     • [place] LAPS [address] [place] [address] RSTRICTIV PX LONGITUDINAL GASTRECTOMY N/A 1/17/2023    Procedure: GASTRECTOMY  SLEEVE W/ ROBOT;  Surgeon: Dariana Kay MD;  Location: [facility] OR;  Service: Bariatrics   • WISDOM TOOTH EXTRACTION      no anesthesia     Social History   Social History     Substance and Sexual Activity   Alcohol Use Yes    Comment: 2 x week     Social History     Substance and Sexual Activity   Drug Use No     Social History     Tobacco Use   Smoking Status Never   Smokeless Tobacco Never     Family History: non-contributory    Meds/Allergies   all medications and allergies reviewed  Allergies   Allergen Reactions   • Clarithromycin Hives   • Tetracyclines & Related Hives       Objective       Current Vitals:   Blood Pressure: 110/70 (05/11/23 0910)  Pulse: 90 (05/11/23 0910)  Temperature: 98 °F (36 7 °C) (05/11/23 0910)  Temp Source: Tympanic (05/11/23 0910)  Height: 5' 6 5\" (168 9 cm) (05/11/23 0910)  Weight - Scale: 85 3 kg (188 lb) (05/11/23 0910)        Invasive Devices     None                 Physical Exam  Vitals reviewed  Constitutional:       General: She is not in acute distress  Appearance: She is well-developed  She is not diaphoretic     HENT:      Head: " Normocephalic and atraumatic  Right Ear: External ear normal       Left Ear: External ear normal    Eyes:      General: No scleral icterus  Right eye: No discharge  Left eye: No discharge  Conjunctiva/sclera: Conjunctivae normal    Abdominal:      General: Bowel sounds are normal  There is no distension  Palpations: Abdomen is soft  There is no mass  Tenderness: There is no abdominal tenderness  There is no guarding or rebound  Comments: Abdomen is soft and benign  Well-healed laparoscopic incisions  Neurological:      Mental Status: She is alert and oriented to person, place, and time  Psychiatric:         Behavior: Behavior normal          Thought Content: Thought content normal          Judgment: Judgment normal          Lab Results: I have personally reviewed pertinent lab results  Imaging: I have personally reviewed pertinent reports  EKG, Pathology, and Other Studies: I have personally reviewed pertinent reports  Assessment/PLAN:    29 y o  female status post laparoscopic sleeve gastrectomy on January 17, 2023  She has lost 58% excess weight loss and 19% total body weight so far she is doing great  She tells me that she has plateaued over the last 2 weeks but given her busy schedule he she has not been able to formally start increasing her physical activity and exercising  I have encouraged her to plan to implement this as well as to make an appointment with our dietitians to go over her nutritional intake  I have ordered a set of nutritional labs and upon reviewing the results coming back further recommendations will be given  I had a detailed discussion with her stressing the fact that long-term success is largely dependent on compliance and abidance to the recommendations of the program as well as participation within the support groups  She is committed to continue to observe her diet and to increase her physical activity      She will follow up with us as scheduled      Jamal Desir MD  5/11/2023  9:16 AM

## 2023-05-11 NOTE — ASSESSMENT & PLAN NOTE
- controlled on current regimen  - continue sertraline 200mg daily, buspar 7 5mg bid and xanax prn   Uses xanax very seldomly, last filled 6 months ago  - continue counseling

## 2023-05-11 NOTE — ASSESSMENT & PLAN NOTE
- controlled on current regimen  - continue sertraline 200mg daily and wellbutrin 75mg bid   - continue counseling

## 2023-05-11 NOTE — PROGRESS NOTES
Assessment/Plan:    Problem List Items Addressed This Visit        Digestive    Herpes labialis     - controlled on valtrex 500mg daily   - check CMP to monitor liver function          Relevant Medications    valACYclovir (VALTREX) 500 mg tablet       Respiratory    Mild intermittent asthma without complication     - continue albuterol prn            Cardiovascular and Mediastinum    Essential hypertension     - previously on lisinopril  - resolved with weight loss surgery  - continue to monitor off medication             Other    Anxiety - Primary     - controlled on current regimen  - continue sertraline 200mg daily, buspar 7 5mg bid and xanax prn  Uses xanax very seldomly, last filled 6 months ago  - continue counseling          Relevant Medications    busPIRone (BUSPAR) 7 5 mg tablet    sertraline (ZOLOFT) 100 mg tablet    ALPRAZolam (XANAX) 0 5 mg tablet    Other insomnia     - continue ambien prn  - uses very seldomly, last filled 6 months ago         Relevant Medications    zolpidem (AMBIEN) 5 mg tablet    Moderate episode of recurrent major depressive disorder (HCC)     - controlled on current regimen  - continue sertraline 200mg daily and wellbutrin 75mg bid   - continue counseling          Relevant Medications    buPROPion (WELLBUTRIN) 75 mg tablet    busPIRone (BUSPAR) 7 5 mg tablet    zolpidem (AMBIEN) 5 mg tablet    sertraline (ZOLOFT) 100 mg tablet    ALPRAZolam (XANAX) 0 5 mg tablet    Status post laparoscopic sleeve gastrectomy     - s/p laparoscopic sleeve gastrectomy 1/17/2023 and followed by Dr Quijano Max: Obesity, Class II, BMI 35-39 9    Relevant Medications    omeprazole (PriLOSEC) 20 mg delayed release capsule   Other Visit Diagnoses     Impacted cerumen, left ear        - most of the cerumen removed, small amount remains but not impacted  TM visible                M*Modal software was used to dictate this note    It may contain errors with dictating incorrect words or incorrect spelling  Please contact the provider directly with any questions  Subjective:      Patient ID: Tiffanie Hale is a 29 y o  female  HPI     Patient presents today for 6 month follow up  No new labs for review today   She is s/p laparoscopic sleeve gastrectomy 1/17/2023 and followed by Dr Mini Bay  She is down 45 lbs since we saw her 6 months ago in Nov     Anxiety - currently on sertraline 200mg daily, Buspar 7 5mg bid and xanax prn  She continues to follow with her therapist every other week  Depression - currently on wellbutrin 75mg bid and sertraline 200mg daily  Feels symptoms are well controlled  Sees her counselor every 2 weeks     Insomnia - uses ambien very infrequently  Last filled 6 months ago       The following portions of the patient's history were reviewed and updated as appropriate: allergies, current medications, past family history, past medical history, past social history, past surgical history and problem list     Review of Systems   Constitutional: Negative for activity change, appetite change and unexpected weight change  Respiratory: Negative for chest tightness and shortness of breath  Psychiatric/Behavioral: Positive for sleep disturbance  Negative for dysphoric mood (controlled)  The patient is not nervous/anxious (controlled)            Past Medical History:   Diagnosis Date   • Allergic    • Anxiety    • Asthma    • COVID-19 virus infection 11/18/2021   • Depression    • FHx: migraine headaches    • Headache(784 0)    • High blood pressure    • Hypertension    • Overdose of drug/medicinal substance, undetermined intent, initial encounter 08/31/2021    Accidental         Current Outpatient Medications:   •  albuterol (ProAir HFA) 90 mcg/act inhaler, Inhale 2 puffs every 6 (six) hours as needed for wheezing, Disp: 18 g, Rfl: 1  •  ALPRAZolam (XANAX) 0 5 mg tablet, Take 1 tablet (0 5 mg total) by mouth 2 (two) times a day as needed for anxiety, Disp: 30 tablet, Rfl: 0  • buPROPion (WELLBUTRIN) 75 mg tablet, Take 1 tablet (75 mg total) by mouth 2 (two) times a day, Disp: 180 tablet, Rfl: 1  •  busPIRone (BUSPAR) 7 5 mg tablet, Take 1 tablet (7 5 mg total) by mouth 2 (two) times a day, Disp: 180 tablet, Rfl: 1  •  Galcanezumab-gnlm 120 MG/ML SOAJ, 1 subcu injection q30 days, Disp: 1 mL, Rfl: 11  •  methocarbamol (ROBAXIN) 500 mg tablet, Take 1 tablet (500 mg total) by mouth daily at bedtime as needed for muscle spasms, Disp: 30 tablet, Rfl: 1  •  metoclopramide (REGLAN) 10 mg tablet, 1 tab as needed at onset of migraine, can repeat in 8 hours, can combine with triptan/NSAID, Disp: 20 tablet, Rfl: 3  •  omeprazole (PriLOSEC) 20 mg delayed release capsule, Take 1 capsule (20 mg total) by mouth daily, Disp: 90 capsule, Rfl: 0  •  sertraline (ZOLOFT) 100 mg tablet, Take 2 tablets (200 mg total) by mouth daily, Disp: 180 tablet, Rfl: 1  •  valACYclovir (VALTREX) 500 mg tablet, Take 1 tablet (500 mg total) by mouth daily, Disp: 90 tablet, Rfl: 1  •  ZOLMitriptan (ZOMIG) 5 MG tablet, 1/2-1 tab at onset of migraine, can repeat in 2 hours, max 10mg/24 hours, Disp: 12 tablet, Rfl: 3  •  zolpidem (AMBIEN) 5 mg tablet, Take 1 tablet (5 mg total) by mouth as needed for sleep, Disp: 30 tablet, Rfl: 0    Allergies   Allergen Reactions   • Clarithromycin Hives   • Tetracyclines & Related Hives       Social History   Past Surgical History:   Procedure Laterality Date   • DILATION AND CURETTAGE OF UTERUS  2017   • EGD     • [place] LAPS GSTRC RSTRICTIV PX LONGITUDINAL GASTRECTOMY N/A 1/17/2023    Procedure: GASTRECTOMY  SLEEVE W/ ROBOT;  Surgeon: Dariana Kay MD;  Location: [facility] OR;  Service: Bariatrics   • WISDOM TOOTH EXTRACTION      no anesthesia     Family History   Problem Relation Age of Onset   • Hypertension Mother    • Atrial fibrillation Mother    • Stroke Mother    • Depression Mother    • Hypertension Father    • Stroke Maternal Grandmother    • Hypertension Maternal Grandmother    • "Atrial fibrillation Maternal Grandmother    • Stroke Maternal Grandfather    • Hypertension Maternal Grandfather    • Heart disease Maternal Grandfather    • Hypertension Paternal Grandfather    • Thyroid disease Neg Hx    • Diabetes Neg Hx        Objective:  /70 (BP Location: Left arm, Patient Position: Sitting, Cuff Size: Standard)   Pulse 71   Temp 98 2 °F (36 8 °C) (Temporal)   Ht 5' 6 85\" (1 698 m)   Wt 85 9 kg (189 lb 6 4 oz)   SpO2 98%   BMI 29 80 kg/m²        Physical Exam  Vitals reviewed  Constitutional:       General: She is not in acute distress  Appearance: Normal appearance  She is not diaphoretic  HENT:      Head: Normocephalic and atraumatic  Right Ear: Tympanic membrane and external ear normal       Left Ear: There is impacted cerumen  Eyes:      Extraocular Movements: Extraocular movements intact  Conjunctiva/sclera: Conjunctivae normal       Pupils: Pupils are equal, round, and reactive to light  Cardiovascular:      Rate and Rhythm: Normal rate and regular rhythm  Heart sounds: Normal heart sounds  Pulmonary:      Effort: Pulmonary effort is normal  No respiratory distress  Breath sounds: Normal breath sounds  No wheezing, rhonchi or rales  Neurological:      Mental Status: She is alert and oriented to person, place, and time  Mental status is at baseline  Psychiatric:         Mood and Affect: Mood normal          Behavior: Behavior normal          Thought Content: Thought content normal          Judgment: Judgment normal          Ear cerumen removal    Date/Time: 5/11/2023 10:00 AM  Performed by: ELAINE Joseph  Authorized by: ELAINE Joseph   Universal Protocol:  Consent: Verbal consent obtained    Risks and benefits: risks, benefits and alternatives were discussed  Consent given by: patient  Patient understanding: patient states understanding of the procedure being performed  Patient identity confirmed: verbally with " patient      Patient location:  Clinic  Procedure details:     Local anesthetic:  None    Location:  L ear    Procedure type: irrigation with instrumentation      Instrumentation: curette and forceps    Post-procedure details:     Complication:  None    Hearing quality:  Normal    Patient tolerance of procedure: Tolerated well, no immediate complications  Comments:      Small amount of cerumen remained in ear due to difficulty removing and patient starting to have some irritation of the canal  The cerumen is not obstructing her TM

## 2023-05-23 ENCOUNTER — APPOINTMENT (OUTPATIENT)
Dept: LAB | Facility: CLINIC | Age: 33
End: 2023-05-23

## 2023-05-23 DIAGNOSIS — K91.2 POSTSURGICAL MALABSORPTION: ICD-10-CM

## 2023-05-23 DIAGNOSIS — Z00.8 HEALTH EXAMINATION IN POPULATION SURVEY: ICD-10-CM

## 2023-05-23 DIAGNOSIS — Z98.84 STATUS POST LAPAROSCOPIC SLEEVE GASTRECTOMY: ICD-10-CM

## 2023-05-23 LAB
25(OH)D3 SERPL-MCNC: 50.1 NG/ML (ref 30–100)
ALBUMIN SERPL BCP-MCNC: 4.6 G/DL (ref 3.5–5)
ALP SERPL-CCNC: 64 U/L (ref 34–104)
ALT SERPL W P-5'-P-CCNC: 11 U/L (ref 7–52)
ANION GAP SERPL CALCULATED.3IONS-SCNC: 3 MMOL/L (ref 4–13)
AST SERPL W P-5'-P-CCNC: 10 U/L (ref 13–39)
BILIRUB SERPL-MCNC: 0.53 MG/DL (ref 0.2–1)
BUN SERPL-MCNC: 19 MG/DL (ref 5–25)
CALCIUM SERPL-MCNC: 9.7 MG/DL (ref 8.4–10.2)
CHLORIDE SERPL-SCNC: 107 MMOL/L (ref 96–108)
CHOLEST SERPL-MCNC: 154 MG/DL
CO2 SERPL-SCNC: 29 MMOL/L (ref 21–32)
CREAT SERPL-MCNC: 0.76 MG/DL (ref 0.6–1.3)
ERYTHROCYTE [DISTWIDTH] IN BLOOD BY AUTOMATED COUNT: 12.9 % (ref 11.6–15.1)
EST. AVERAGE GLUCOSE BLD GHB EST-MCNC: 100 MG/DL
FERRITIN SERPL-MCNC: 60 NG/ML (ref 11–307)
GFR SERPL CREATININE-BSD FRML MDRD: 105 ML/MIN/1.73SQ M
GLUCOSE P FAST SERPL-MCNC: 90 MG/DL (ref 65–99)
HBA1C MFR BLD: 5.1 %
HCT VFR BLD AUTO: 40.2 % (ref 34.8–46.1)
HDLC SERPL-MCNC: 47 MG/DL
HGB BLD-MCNC: 13.6 G/DL (ref 11.5–15.4)
LDLC SERPL CALC-MCNC: 94 MG/DL (ref 0–100)
MCH RBC QN AUTO: 31.3 PG (ref 26.8–34.3)
MCHC RBC AUTO-ENTMCNC: 33.8 G/DL (ref 31.4–37.4)
MCV RBC AUTO: 93 FL (ref 82–98)
NONHDLC SERPL-MCNC: 107 MG/DL
PLATELET # BLD AUTO: 235 THOUSANDS/UL (ref 149–390)
PMV BLD AUTO: 11 FL (ref 8.9–12.7)
POTASSIUM SERPL-SCNC: 4.1 MMOL/L (ref 3.5–5.3)
PROT SERPL-MCNC: 7.2 G/DL (ref 6.4–8.4)
PTH-INTACT SERPL-MCNC: 46.2 PG/ML (ref 12–88)
RBC # BLD AUTO: 4.34 MILLION/UL (ref 3.81–5.12)
SODIUM SERPL-SCNC: 139 MMOL/L (ref 135–147)
TRIGL SERPL-MCNC: 65 MG/DL
TSH SERPL DL<=0.05 MIU/L-ACNC: 1.01 UIU/ML (ref 0.45–4.5)
VIT B12 SERPL-MCNC: 556 PG/ML (ref 180–914)
WBC # BLD AUTO: 5.62 THOUSAND/UL (ref 4.31–10.16)

## 2023-05-26 LAB — VIT B1 BLD-SCNC: 236.2 NMOL/L (ref 66.5–200)

## 2023-05-27 LAB — VIT A SERPL-MCNC: 47.7 UG/DL (ref 18.9–57.3)

## 2023-08-28 ENCOUNTER — PROCEDURE VISIT (OUTPATIENT)
Dept: OBGYN CLINIC | Facility: CLINIC | Age: 33
End: 2023-08-28

## 2023-08-28 VITALS
HEIGHT: 67 IN | BODY MASS INDEX: 26.37 KG/M2 | DIASTOLIC BLOOD PRESSURE: 70 MMHG | WEIGHT: 168 LBS | SYSTOLIC BLOOD PRESSURE: 118 MMHG

## 2023-08-28 DIAGNOSIS — Z30.433 ENCOUNTER FOR IUD REMOVAL AND REINSERTION: Primary | ICD-10-CM

## 2023-08-28 DIAGNOSIS — N88.2 CERVICAL STENOSIS (UTERINE CERVIX): ICD-10-CM

## 2023-08-28 LAB — SL AMB POCT URINE HCG: NEGATIVE

## 2023-08-28 RX ORDER — MISOPROSTOL 200 UG/1
200 TABLET ORAL ONCE
Qty: 1 TABLET | Refills: 0 | Status: SHIPPED | OUTPATIENT
Start: 2023-08-28 | End: 2023-08-28

## 2023-08-28 NOTE — PROGRESS NOTES
[facility]  IUD removal/ reinsertion  Donovan Rivera MD  23      Concepcion Espinoza is a 30 yo  with Mirena IUD presenting for removal and reinsertion- patient is using her IUD for amenorrhea purposes- was placed 2017 and she has begun having menses again this past year with heavy cycles. She has no complaints today. /70 (BP Location: Left arm, Patient Position: Sitting, Cuff Size: Standard)   Ht 5' 6.85" (1.698 m)   Wt 76.2 kg (168 lb)   LMP 2023 (Exact Date)   BMI 26.43 kg/m²    Physical Exam  Vitals and nursing note reviewed. Constitutional:       General: She is not in acute distress. Appearance: She is well-developed. She is not ill-appearing or diaphoretic. HENT:      Head: Normocephalic and atraumatic. Cardiovascular:      Rate and Rhythm: Normal rate. Pulmonary:      Effort: Pulmonary effort is normal. No respiratory distress. Genitourinary:     Comments: Normal appearing vulva without lesions- on speculum exam the cervix appears grossly normal- IUD strings noted at os- there is minimal mucoid bloody discharge. Musculoskeletal:         General: No swelling. Cervical back: Neck supple. Skin:     General: Skin is warm and dry. Neurological:      Mental Status: She is alert. Psychiatric:         Mood and Affect: Mood normal.         Behavior: Behavior normal.     Iud removal    Date/Time: 2023 1:00 PM    Performed by: Donovan Rivera MD  Authorized by: Donovan Rivera MD  Universal Protocol:  Procedure performed by: Crystal Villafana MA)  Consent: Verbal consent obtained.   Risks and benefits: risks, benefits and alternatives were discussed  Consent given by: patient      Procedure:     Removed with no complications: yes      Other reason for removal:  Due for removal and reinsertion  Iud insertions    Date/Time: 2023 1:00 PM    Performed by: Donovan Rivera MD  Authorized by: Donovan Rivera MD    Other Assisting Provider: Yes (comment)    Verbal consent obtained?: Yes    Risks and benefits: Risks, benefits and alternatives were discussed    Consent given by:  Patient  Procedure:     Pelvic exam performed: yes      Cervix cleaned and prepped: yes      Speculum placed in vagina: yes      Tenaculum applied to cervix: yes      Allis applied to cervix: no      Uterus sounded: no      IUD inserted with no complications: no    Comments:      Speculum was placed and cervix was cleansed x3 with iodine. Attempt was made to pass EMB pipelle but was unable to be passed through internal os and a tenaculum was placed on the anterior lip of the cervix. EMB pipelle was still unable to be passed through the internal os and attempt was made to use a cervical dilator to pass through the internal os which was unsuccessful. In order to not arm the patient and reduce risk of uterine perforation attempt at this time was aborted with plan to retry insertion following cytotec administration given internal os stenosis. Patient was in agreement and procedure was aborted.

## 2023-09-05 ENCOUNTER — PROCEDURE VISIT (OUTPATIENT)
Dept: OBGYN CLINIC | Facility: CLINIC | Age: 33
End: 2023-09-05

## 2023-09-05 DIAGNOSIS — N93.9 ABNORMAL UTERINE BLEEDING (AUB): ICD-10-CM

## 2023-09-05 DIAGNOSIS — N88.2 CERVICAL STENOSIS (UTERINE CERVIX): ICD-10-CM

## 2023-09-05 DIAGNOSIS — Z53.8 UNSUCCESSFUL ATTEMPT TO INSERT INTRAUTERINE DEVICE (IUD): Primary | ICD-10-CM

## 2023-09-05 RX ORDER — MISOPROSTOL 200 UG/1
400 TABLET ORAL ONCE
Qty: 2 TABLET | Refills: 0 | Status: SHIPPED | OUTPATIENT
Start: 2023-09-05 | End: 2023-09-13

## 2023-09-05 NOTE — H&P (VIEW-ONLY)
[facility]  IUD insertion  Servando Shone, MD  23      Shabnam Hui is a 30 yo  female presenting for Mirena IUD insertion following unsuccessful prior attempt secondary to cervical stenosis. Shabnam Hui has taken 200 mcg of Cytotec prior to her procedure to assist with insertion. Shabnam Hui uses the IUD primarily for AUB purposes and has been having some irregular bleedng since removal 2023. Iud insertions    Date/Time: 2023 1:00 PM    Performed by: Servando Shone, MD  Authorized by: Servando Shone, MD    Other Assisting Provider: Yes (comment) Camelia Epps MA)    Verbal consent obtained?: Yes    Risks and benefits: Risks, benefits and alternatives were discussed    Consent given by:  Patient  Procedure:     Cervix cleaned and prepped: yes      Speculum placed in vagina: yes      Tenaculum applied to cervix: yes      Uterus sounded: yes      Uterus sound depth (cm):  8    IUD inserted with no complications: no        A/P:  Shabnam Hui is a 30 yo female with second unsuccessful IUD insertion placement in office despite cytotec- Uterine cavity was entered but unable to dilate and place device after multiple attempts and ultimately procedure was abandoned secondary to patient discomfort. I discussed recommendations of ultrasound and attempt at EUA and trial of IUD placement under general anesthesia with different instrumentation and ability to dilate cervix more comfortably- we reviewed risks including, bleeding, infection, uterine perforation, injury to surrounding structures, need for further procedures, inability to complete and risks of anesthesia. Shabnam Hui did not have any questions and informed consent was obtained today. Plan will be for pelvic ultrasound and plan for pre-op cytotec again of 400 mcg which was ordered for her. She will be added to Dr. Enrico Peter schedule for patient schedule purposes.      Servando Shone, MD  23

## 2023-09-05 NOTE — PROGRESS NOTES
[facility]  IUD insertion  Karma Wooten MD  23      Cristiane Lees is a 28 yo  female presenting for Mirena IUD insertion following unsuccessful prior attempt secondary to cervical stenosis. Cristiane Lees has taken 200 mcg of Cytotec prior to her procedure to assist with insertion. Cristiane Lees uses the IUD primarily for AUB purposes and has been having some irregular bleedng since removal 2023. Iud insertions    Date/Time: 2023 1:00 PM    Performed by: Karma Wooten MD  Authorized by: Karma Wooten MD    Other Assisting Provider: Yes (comment) Hever Gong MA)    Verbal consent obtained?: Yes    Risks and benefits: Risks, benefits and alternatives were discussed    Consent given by:  Patient  Procedure:     Cervix cleaned and prepped: yes      Speculum placed in vagina: yes      Tenaculum applied to cervix: yes      Uterus sounded: yes      Uterus sound depth (cm):  8    IUD inserted with no complications: no        A/P:  Cristiane Lees is a 28 yo female with second unsuccessful IUD insertion placement in office despite cytotec- Uterine cavity was entered but unable to dilate and place device after multiple attempts and ultimately procedure was abandoned secondary to patient discomfort. I discussed recommendations of ultrasound and attempt at EUA and trial of IUD placement under general anesthesia with different instrumentation and ability to dilate cervix more comfortably- we reviewed risks including, bleeding, infection, uterine perforation, injury to surrounding structures, need for further procedures, inability to complete and risks of anesthesia. Cristiane Lees did not have any questions and informed consent was obtained today. Plan will be for pelvic ultrasound and plan for pre-op cytotec again of 400 mcg which was ordered for her. She will be added to Dr. Amanda Mijares schedule for patient schedule purposes.      Karma Wooten MD  23

## 2023-09-06 NOTE — PRE-PROCEDURE INSTRUCTIONS
Pre-Surgery Instructions:   Medication Instructions   • albuterol (ProAir HFA) 90 mcg/act inhaler Uses PRN- OK to take day of surgery   • ALPRAZolam (XANAX) 0.5 mg tablet Uses PRN- OK to take day of surgery   • buPROPion (WELLBUTRIN) 75 mg tablet Take day of surgery. • busPIRone (BUSPAR) 7.5 mg tablet Take day of surgery. • Galcanezumab-gnlm 120 MG/ML SOAJ Instructions provided by MD   • methocarbamol (ROBAXIN) 500 mg tablet Hold day of surgery. • metoclopramide (REGLAN) 10 mg tablet Hold day of surgery. • omeprazole (PriLOSEC) 20 mg delayed release capsule Take day of surgery. • sertraline (ZOLOFT) 100 mg tablet Take day of surgery. • valACYclovir (VALTREX) 500 mg tablet Hold day of surgery. • ZOLMitriptan (ZOMIG) 5 MG tablet Instructions provided by MD   • zolpidem (AMBIEN) 5 mg tablet Hold day of surgery. Medication instructions for day surgery reviewed. Please use only a sip of water to take your instructed medications. Avoid all over the counter vitamins, supplements and NSAIDS for one week prior to surgery per anesthesia guidelines. Tylenol is ok to take as needed. You will receive a call one business day prior to surgery with an arrival time and hospital directions. If your surgery is scheduled on a Monday, the hospital will be calling you on the Friday prior to your surgery. If you have not heard from anyone by 8pm, please call the hospital supervisor through the hospital  at [phone]. Lakia Every [phone]). Do not eat or drink anything after midnight the night before your surgery, including candy, mints, lifesavers, or chewing gum. Do not drink alcohol 24hrs before your surgery. Try not to smoke at least 24hrs before your surgery. Follow the pre surgery showering instructions as listed in the [facility] Surgical Experience Booklet” or otherwise provided by your surgeon's office. Do not shave the surgical area 24 hours before surgery.  Do not apply any lotions, creams, including makeup, cologne, deodorant, or perfumes after showering on the day of your surgery. No contact lenses, eye make-up, or artificial eyelashes. Remove nail polish, including gel polish, and any artificial, gel, or acrylic nails if possible. Remove all jewelry including rings and body piercing jewelry. Wear causal clothing that is easy to take on and off. Consider your type of surgery. Keep any valuables, jewelry, piercings at home. Please bring any specially ordered equipment (sling, braces) if indicated. Arrange for a responsible person to drive you to and from the hospital on the day of your surgery. Visitor Guidelines discussed. Call the surgeon's office with any new illnesses, exposures, or additional questions prior to surgery. Please reference your [facility] Surgical Experience Booklet” for additional information to prepare for your upcoming surgery.

## 2023-09-08 ENCOUNTER — ANESTHESIA EVENT (OUTPATIENT)
Dept: PERIOP | Facility: HOSPITAL | Age: 33
End: 2023-09-08
Payer: COMMERCIAL

## 2023-09-08 PROBLEM — K21.9 GASTROESOPHAGEAL REFLUX DISEASE: Status: ACTIVE | Noted: 2023-09-08

## 2023-09-08 NOTE — ANESTHESIA PREPROCEDURE EVALUATION
Procedure:  INSERTION OF INTRAUTERINE DEVICE (IUD), EUA (Uterus)    Relevant Problems   CARDIO   (+) Migraine with aura and without status migrainosus, not intractable   (+) Migraine without aura and without status migrainosus, not intractable      NEURO/PSYCH   (+) Anxiety   (+) Migraine with aura and without status migrainosus, not intractable   (+) Migraine without aura and without status migrainosus, not intractable   (+) Moderate episode of recurrent major depressive disorder (HCC)   (+) Tension headache      PULMONARY   (+) Mild intermittent asthma without complication      Other   (+) Status post laparoscopic sleeve gastrectomy        Physical Exam    Airway    Mallampati score: II  TM Distance: >3 FB  Neck ROM: full     Dental       Cardiovascular  Rhythm: regular, Rate: normal,     Pulmonary  Breath sounds clear to auscultation,     Other Findings        Anesthesia Plan  ASA Score- 2     Anesthesia Type- IV sedation with anesthesia with ASA Monitors. Additional Monitors:   Airway Plan:           Plan Factors-    Chart reviewed. Patient is not a current smoker. Induction- intravenous. Postoperative Plan- Plan for postoperative opioid use. Informed Consent- Anesthetic plan and risks discussed with patient. I personally reviewed this patient with the CRNA. Discussed and agreed on the Anesthesia Plan with the CRNA. Arnold Zayas

## 2023-09-09 ENCOUNTER — HOSPITAL ENCOUNTER (OUTPATIENT)
Dept: RADIOLOGY | Facility: HOSPITAL | Age: 33
Discharge: HOME | End: 2023-09-09
Attending: STUDENT IN AN ORGANIZED HEALTH CARE EDUCATION/TRAINING PROGRAM
Payer: COMMERCIAL

## 2023-09-09 DIAGNOSIS — Z53.8 UNSUCCESSFUL ATTEMPT TO INSERT INTRAUTERINE DEVICE (IUD): ICD-10-CM

## 2023-09-09 DIAGNOSIS — N93.9 ABNORMAL UTERINE BLEEDING (AUB): ICD-10-CM

## 2023-09-09 DIAGNOSIS — N88.2 CERVICAL STENOSIS (UTERINE CERVIX): ICD-10-CM

## 2023-09-09 PROCEDURE — 76830 TRANSVAGINAL US NON-OB: CPT

## 2023-09-09 PROCEDURE — 76856 US EXAM PELVIC COMPLETE: CPT

## 2023-09-13 ENCOUNTER — COSMETIC (OUTPATIENT)
Dept: PLASTIC SURGERY | Facility: CLINIC | Age: 33
End: 2023-09-13

## 2023-09-13 ENCOUNTER — HOSPITAL ENCOUNTER (OUTPATIENT)
Facility: HOSPITAL | Age: 33
Setting detail: OUTPATIENT SURGERY
Discharge: HOME | End: 2023-09-13
Attending: STUDENT IN AN ORGANIZED HEALTH CARE EDUCATION/TRAINING PROGRAM | Admitting: STUDENT IN AN ORGANIZED HEALTH CARE EDUCATION/TRAINING PROGRAM
Payer: COMMERCIAL

## 2023-09-13 ENCOUNTER — ANESTHESIA (OUTPATIENT)
Dept: PERIOP | Facility: HOSPITAL | Age: 33
End: 2023-09-13
Payer: COMMERCIAL

## 2023-09-13 VITALS
TEMPERATURE: 98.3 F | HEIGHT: 67 IN | DIASTOLIC BLOOD PRESSURE: 65 MMHG | BODY MASS INDEX: 26.02 KG/M2 | HEART RATE: 90 BPM | OXYGEN SATURATION: 99 % | RESPIRATION RATE: 22 BRPM | WEIGHT: 165.79 LBS | SYSTOLIC BLOOD PRESSURE: 182 MMHG

## 2023-09-13 DIAGNOSIS — Z41.1 ENCOUNTER FOR COSMETIC SURGERY: Primary | ICD-10-CM

## 2023-09-13 PROBLEM — N92.0 MENORRHAGIA: Status: ACTIVE | Noted: 2023-09-13

## 2023-09-13 PROBLEM — Z97.5 IUD (INTRAUTERINE DEVICE) IN PLACE: Status: ACTIVE | Noted: 2023-09-13

## 2023-09-13 LAB
EXT PREGNANCY TEST URINE: NEGATIVE
EXT. CONTROL: NORMAL

## 2023-09-13 PROCEDURE — 58300 INSERT INTRAUTERINE DEVICE: CPT | Performed by: STUDENT IN AN ORGANIZED HEALTH CARE EDUCATION/TRAINING PROGRAM

## 2023-09-13 PROCEDURE — 81025 URINE PREGNANCY TEST: CPT | Performed by: STUDENT IN AN ORGANIZED HEALTH CARE EDUCATION/TRAINING PROGRAM

## 2023-09-13 PROCEDURE — RECHECK: Performed by: STUDENT IN AN ORGANIZED HEALTH CARE EDUCATION/TRAINING PROGRAM

## 2023-09-13 PROCEDURE — BOTOX2 TWO AREAS OR 50 UNITS: Performed by: STUDENT IN AN ORGANIZED HEALTH CARE EDUCATION/TRAINING PROGRAM

## 2023-09-13 RX ORDER — SODIUM CHLORIDE, SODIUM LACTATE, POTASSIUM CHLORIDE, CALCIUM CHLORIDE 600; 310; 30; 20 MG/100ML; MG/100ML; MG/100ML; MG/100ML
INJECTION, SOLUTION INTRAVENOUS CONTINUOUS PRN
Status: DISCONTINUED | OUTPATIENT
Start: 2023-09-13 | End: 2023-09-13

## 2023-09-13 RX ORDER — SODIUM CHLORIDE, SODIUM LACTATE, POTASSIUM CHLORIDE, CALCIUM CHLORIDE 600; 310; 30; 20 MG/100ML; MG/100ML; MG/100ML; MG/100ML
75 INJECTION, SOLUTION INTRAVENOUS CONTINUOUS
Status: DISCONTINUED | OUTPATIENT
Start: 2023-09-13 | End: 2023-09-13 | Stop reason: HOSPADM

## 2023-09-13 RX ORDER — ACETAMINOPHEN 325 MG/1
650 TABLET ORAL ONCE
Status: COMPLETED | OUTPATIENT
Start: 2023-09-13 | End: 2023-09-13

## 2023-09-13 RX ORDER — FENTANYL CITRATE/PF 50 MCG/ML
50 SYRINGE (ML) INJECTION
Status: DISCONTINUED | OUTPATIENT
Start: 2023-09-13 | End: 2023-09-13 | Stop reason: HOSPADM

## 2023-09-13 RX ORDER — KETAMINE HCL IN NACL, ISO-OSM 100MG/10ML
SYRINGE (ML) INJECTION AS NEEDED
Status: DISCONTINUED | OUTPATIENT
Start: 2023-09-13 | End: 2023-09-13

## 2023-09-13 RX ORDER — PROMETHAZINE HYDROCHLORIDE 25 MG/ML
25 INJECTION, SOLUTION INTRAMUSCULAR; INTRAVENOUS ONCE AS NEEDED
Status: DISCONTINUED | OUTPATIENT
Start: 2023-09-13 | End: 2023-09-13 | Stop reason: HOSPADM

## 2023-09-13 RX ORDER — LIDOCAINE HYDROCHLORIDE 10 MG/ML
INJECTION, SOLUTION EPIDURAL; INFILTRATION; INTRACAUDAL; PERINEURAL AS NEEDED
Status: DISCONTINUED | OUTPATIENT
Start: 2023-09-13 | End: 2023-09-13

## 2023-09-13 RX ORDER — FENTANYL CITRATE 50 UG/ML
INJECTION, SOLUTION INTRAMUSCULAR; INTRAVENOUS AS NEEDED
Status: DISCONTINUED | OUTPATIENT
Start: 2023-09-13 | End: 2023-09-13

## 2023-09-13 RX ORDER — PROPOFOL 10 MG/ML
INJECTION, EMULSION INTRAVENOUS AS NEEDED
Status: DISCONTINUED | OUTPATIENT
Start: 2023-09-13 | End: 2023-09-13

## 2023-09-13 RX ORDER — MIDAZOLAM HYDROCHLORIDE 2 MG/2ML
INJECTION, SOLUTION INTRAMUSCULAR; INTRAVENOUS AS NEEDED
Status: DISCONTINUED | OUTPATIENT
Start: 2023-09-13 | End: 2023-09-13

## 2023-09-13 RX ORDER — ALPRAZOLAM 0.5 MG/1
0.5 TABLET ORAL ONCE
Status: COMPLETED | OUTPATIENT
Start: 2023-09-13 | End: 2023-09-13

## 2023-09-13 RX ORDER — DEXAMETHASONE SODIUM PHOSPHATE 10 MG/ML
INJECTION, SOLUTION INTRAMUSCULAR; INTRAVENOUS AS NEEDED
Status: DISCONTINUED | OUTPATIENT
Start: 2023-09-13 | End: 2023-09-13

## 2023-09-13 RX ORDER — KETOROLAC TROMETHAMINE 30 MG/ML
INJECTION, SOLUTION INTRAMUSCULAR; INTRAVENOUS AS NEEDED
Status: DISCONTINUED | OUTPATIENT
Start: 2023-09-13 | End: 2023-09-13

## 2023-09-13 RX ORDER — ONDANSETRON 2 MG/ML
4 INJECTION INTRAMUSCULAR; INTRAVENOUS ONCE
Status: COMPLETED | OUTPATIENT
Start: 2023-09-13 | End: 2023-09-13

## 2023-09-13 RX ORDER — PROPOFOL 10 MG/ML
INJECTION, EMULSION INTRAVENOUS CONTINUOUS PRN
Status: DISCONTINUED | OUTPATIENT
Start: 2023-09-13 | End: 2023-09-13

## 2023-09-13 RX ORDER — ONDANSETRON 2 MG/ML
INJECTION INTRAMUSCULAR; INTRAVENOUS AS NEEDED
Status: DISCONTINUED | OUTPATIENT
Start: 2023-09-13 | End: 2023-09-13

## 2023-09-13 RX ORDER — MAGNESIUM HYDROXIDE 1200 MG/15ML
LIQUID ORAL AS NEEDED
Status: DISCONTINUED | OUTPATIENT
Start: 2023-09-13 | End: 2023-09-13 | Stop reason: HOSPADM

## 2023-09-13 RX ADMIN — Medication 20 MG: at 10:27

## 2023-09-13 RX ADMIN — MIDAZOLAM HYDROCHLORIDE 2 MG: 1 INJECTION, SOLUTION INTRAMUSCULAR; INTRAVENOUS at 10:20

## 2023-09-13 RX ADMIN — FENTANYL CITRATE 25 MCG: 50 INJECTION, SOLUTION INTRAMUSCULAR; INTRAVENOUS at 10:31

## 2023-09-13 RX ADMIN — ONDANSETRON 4 MG: 2 INJECTION INTRAMUSCULAR; INTRAVENOUS at 10:34

## 2023-09-13 RX ADMIN — PROPOFOL 30 MG: 10 INJECTION, EMULSION INTRAVENOUS at 10:27

## 2023-09-13 RX ADMIN — ONDANSETRON 4 MG: 2 INJECTION INTRAMUSCULAR; INTRAVENOUS at 09:41

## 2023-09-13 RX ADMIN — SODIUM CHLORIDE, SODIUM LACTATE, POTASSIUM CHLORIDE, AND CALCIUM CHLORIDE: .6; .31; .03; .02 INJECTION, SOLUTION INTRAVENOUS at 10:19

## 2023-09-13 RX ADMIN — ALPRAZOLAM 0.5 MG: 0.5 TABLET ORAL at 11:29

## 2023-09-13 RX ADMIN — KETOROLAC TROMETHAMINE 30 MG: 30 INJECTION, SOLUTION INTRAMUSCULAR at 10:34

## 2023-09-13 RX ADMIN — FENTANYL CITRATE 25 MCG: 50 INJECTION, SOLUTION INTRAMUSCULAR; INTRAVENOUS at 10:34

## 2023-09-13 RX ADMIN — DEXAMETHASONE SODIUM PHOSPHATE 10 MG: 10 INJECTION, SOLUTION INTRAMUSCULAR; INTRAVENOUS at 10:34

## 2023-09-13 RX ADMIN — ACETAMINOPHEN 650 MG: 325 TABLET ORAL at 11:49

## 2023-09-13 RX ADMIN — LIDOCAINE HYDROCHLORIDE 50 MG: 10 INJECTION, SOLUTION EPIDURAL; INFILTRATION; INTRACAUDAL; PERINEURAL at 10:27

## 2023-09-13 RX ADMIN — FENTANYL CITRATE 50 MCG: 50 INJECTION, SOLUTION INTRAMUSCULAR; INTRAVENOUS at 10:27

## 2023-09-13 RX ADMIN — PROPOFOL 100 MCG/KG/MIN: 10 INJECTION, EMULSION INTRAVENOUS at 10:28

## 2023-09-13 NOTE — ANESTHESIA POSTPROCEDURE EVALUATION
Post-Op Assessment Note    CV Status:  Stable  Pain Score: 0    Pain management: adequate  Multimodal analgesia used between 6 hours prior to anesthesia start to PACU discharge    Mental Status:  Alert and anxious   Hydration Status:  Stable   PONV Controlled:  None   Airway Patency:  Patent   Two or more mitigation strategies used for obstructive sleep apnea   Post Op Vitals Reviewed: Yes      Staff: CRNA         No notable events documented.     BP  122/85   Temp 97   Pulse 94   Resp 16   SpO2 99

## 2023-09-13 NOTE — OR NURSING
Mirena IUD placed in OR by MARIA L Albert MD 9/13/23    Mirena (levonorgestrel-releasing intrauterine system) 52mg  Lot: [id number]  Exp: 2025/June KR

## 2023-09-13 NOTE — OP NOTE
OPERATIVE REPORT  PATIENT NAME: Jagdish Reynaga    :  1990  MRN: [id number]  Pt Location: [facility] OR ROOM 03    SURGERY DATE: 2023    Surgeon(s) and Role:     * Sarah Norman MD - Primary    Preop Diagnosis:    * Cervical stenosis (uterine cervix) [N88.2]     * Menorrhagia [N92.0]    Post-Op Diagnosis Codes:     * Cervical stenosis (uterine cervix) [N88.2]     * Menorrhagia [N92.0]    Procedure(s):  INSERTION OF INTRAUTERINE DEVICE (IUD). EUA    Specimen(s):  * No specimens in log *    Estimated Blood Loss:   Minimal    Drains:  * No LDAs found *    Anesthesia Type:   Choice    Operative Indications:  Cervical stenosis (uterine cervix) [N88.2]  Abnormal uterine bleeding [N93.9]    Operative Findings:   Normal external genitalia  Anteverted, mobile uterus with moderate descensus  Ultrasound used to visualize uterus and cervical canal  Mildly tortuous cervix with posterior ridge approximately 3-cm into cervix  Proper IUD placement confirmed by TAUS at end of procedure    Description of Procedure  Patient was taken to the operating room were a time out was performed to confirm correct patient and correct procedure. Monitored anesthesia care (MAC) was administered and the patient was positioned on the OR table in the dorsal lithotomy position. All pressure points were padded and a kindra hugger was placed to maintain control of core body temperature. A bimanual exam was performed and the uterus was noted to be anteverted, normal in size and consistency with no palpable adnexal masses or fullness. The patient was prepped and draped in the usual sterile fashion. Operative Technique  A straight catheter was introduced into the bladder and the bladder was backfilled with 200cc of normal saline. Ultrasound was used to confirm position of the uterus and confirm cervical path.     Attention was then turned to the vagina, where a nonweighted speculum was inserted into the vagina and a Kaye retractor was used to visualize the anterior lip of the cervix which was grasped with a single-tooth tenaculum. The uterus sounded to 8 cm, the cervix was serially dilated to 15 Fr. Mirena IUD was prepared and inserted according to manufacture instructions. The strings were trimmed to 3 cm. The single-tooth tenaculum was removed from the cervix and the tenaculum sites were confirmed to be hemostatic. All instruments were withdrawn from the vagina. At the end of the procedure, transabdominal ultrasound was used to confirm appropriate placement of the IUD. At the conclusion of the procedure, all needle, sponge, and instrument counts were noted to be correct x2. Patient tolerated the procedure well and was transferred to PACU in stable condition prior to discharge with follow up in 1-2 weeks.        Complications:   None    Patient Disposition:  PACU     SIGNATURE: Db Olivares MD  DATE: September 13, 2023  TIME: 1:35 PM

## 2023-09-13 NOTE — INTERVAL H&P NOTE
H&P reviewed. Confirmed with patient that no changes in the patients condition since the H&P had been written. Confirmed planned procedure as EUA, Iud insertion. All questions answered to the best of my ability.      Vitals:    09/13/23 0926   BP: 120/69   Pulse: 62   Resp: 18   Temp: 98.7 °F (37.1 °C)   SpO2: 98%

## 2023-09-13 NOTE — PERIOPERATIVE NURSING NOTE
Patient given tylenol for moderate cramping at 5/10. Patient tolerating PO fluids. IV access removed. Patient is starting to be more relaxed. AVS reviewed with patient and spouse. All questions answered and all concerns addressed.

## 2023-09-13 NOTE — PERIOPERATIVE NURSING NOTE
Received patient from PACU, awake and alert, crying hysterically as pt states this is how she is from anesthesia. Vitals taken, BP reflects , as it is high at 182/65. Patient given PO medication for anxiety. Tolerating PO fluids. Patient spouse at bedside. Call bell within reach. Care plan ongoing.

## 2023-09-14 NOTE — PROGRESS NOTES
After informed consent, 25 units of botox was injected into the glabella and forehead. The patient tolerated the procedure well. Post-procedure instructions provided. Patient to return in 3 months or sooner should any issues arise. Casie Calvillo, DO  Plastic and Reconstructive Surgery

## 2023-09-25 ENCOUNTER — OFFICE VISIT (OUTPATIENT)
Dept: BARIATRICS | Facility: CLINIC | Age: 33
End: 2023-09-25
Payer: COMMERCIAL

## 2023-09-25 VITALS
SYSTOLIC BLOOD PRESSURE: 100 MMHG | TEMPERATURE: 96 F | WEIGHT: 163.5 LBS | HEART RATE: 74 BPM | BODY MASS INDEX: 25.66 KG/M2 | DIASTOLIC BLOOD PRESSURE: 60 MMHG | HEIGHT: 67 IN | OXYGEN SATURATION: 97 %

## 2023-09-25 DIAGNOSIS — Z98.84 BARIATRIC SURGERY STATUS: ICD-10-CM

## 2023-09-25 DIAGNOSIS — K91.2 POSTSURGICAL MALABSORPTION: ICD-10-CM

## 2023-09-25 DIAGNOSIS — Z48.815 ENCOUNTER FOR SURGICAL AFTERCARE FOLLOWING SURGERY OF DIGESTIVE SYSTEM: Primary | ICD-10-CM

## 2023-09-25 PROCEDURE — 99213 OFFICE O/P EST LOW 20 MIN: CPT | Performed by: NURSE PRACTITIONER

## 2023-09-25 NOTE — PROGRESS NOTES
Assessment/Plan:     Patient ID: Misti Perez is a 29 y.o. female. Bariatric Surgery Status    -s/p Vertical Sleeve Gastrectomy with Dr. Halina Franz on 01/17/2023. Presents to the office today for 3rd post op visit. Overall doing very well, tolerating a regular diet. Denies having any abdominal pain, N/V/D/C, regurgitation, reflux or dysphagia. Taking her MVI and PPI daily. At times misses her omeprazole doses and did not experience any heartburn symptoms. PLAN:     - taper off omeprazole as discussed. - Routine follow up in 6 months for annual visit. - Continue with healthy lifestyle, adequate protein intake of 60 gm, fluid intake of at least 64 oz. - Continue with MVI daily. - Activity as tolerated. - Labs ordered and will adjust accordingly if any deficiency. - Follow up with RD and SW as needed. · Continued/Maintain healthy weight loss with good nutrition intakes. · Adequate hydration with at least 64oz. fluid intake. · Follow diet as discussed. · Follow vitamin and mineral recommendations as reviewed with you. · Exercise as tolerated. · Colonoscopy referral made: n/a  · Mammogram - N/A  ·     · Follow-up in 6 months for annual visit. We kindly ask that your arrive 15 minutes before your scheduled appointment time with your provider to allow our staff to room you, get your vital signs and update your chart. · Get lab work done prior to annual visit. Please call the office if you need a script. It is recommended to check with your insurance BEFORE getting labs done to make sure they are covered by your policy. · Call our office if you have any problems with abdominal pain especially associated with fever, chills, nausea, vomiting or any other concerns. · All  Post-bariatric surgery patients should be aware that very small quantities of any alcohol can cause impairment and it is very possible not to feel the effect. The effect can be in the system for several hours.   It is also a stomach irritant. · It is advised to AVOID alcohol, Nonsteroidal antiinflammatory drugs (NSAIDS) and nicotine of all forms . Any of these can cause stomach irritation/pain. · Discussed the effects of alcohol on a bariatric patient and the increased impairment risk. · Keep up the good work! Postsurgical Malabsorption   -At risk for malabsorption of vitamins/minerals secondary to malabsorption and restriction of intake from bariatric surgery  -Currently taking adequate postop bariatric surgery vitamin supplementation  -Last set of bariatric labs completed on 04/2023 and showed WNL   -Patient received education about the importance of adhering to a lifelong supplementation regimen to avoid vitamin/mineral deficiencies      Diagnoses and all orders for this visit:    Encounter for surgical aftercare following surgery of digestive system    Bariatric surgery status    Postsurgical malabsorption    BMI 25.0-25.9,adult         Subjective:      Patient ID: Jagdish Reynaga is a 29 y.o. female. -s/p Vertical Sleeve Gastrectomy with Dr. Ger Anaya on 01/17/2023. Presents to the office today for 3rd post op visit. Overall doing very well, tolerating a regular diet. Denies having any abdominal pain, N/V/D/C, regurgitation, reflux or dysphagia. Taking her MVI and PPI daily. At times misses her omeprazole doses and did not experience any heartburn symptoms. Initial: 250 lbs  Current: 163.5 lbs  EWL: (Weight loss is ahead of schedule at this post surgical period.)  Justus: current  Current BMI is Body mass index is 25.99 kg/m².     · Tolerating a regular diet-yes  · Eating at least 60 grams of protein per day-yes  · Following 30/60 minute rule with liquids-yes  · Drinking at least 64 ounces of fluid per day-yes  · Drinking carbonated beverages-no  · Sufficient exercise- no but does walk her dogs  · Using NSAIDs regularly-no  · Using nicotine-no  · Using alcohol-no  · Supplements: Multivitamins + high calcium food intake. · EWL is 92%, which places the patient ahead of schedule for expected post surgical weight loss at this time. The following portions of the patient's history were reviewed and updated as appropriate: allergies, current medications, past family history, past medical history, past social history, past surgical history and problem list.    Review of Systems   Constitutional: Negative. Respiratory: Negative. Cardiovascular: Negative. Gastrointestinal: Negative. Musculoskeletal: Negative. Neurological: Negative. Psychiatric/Behavioral: Negative. Objective:    /60 (BP Location: Left arm, Patient Position: Sitting, Cuff Size: Large)   Pulse 74   Temp (!) 96 °F (35.6 °C) (Tympanic)   Ht 5' 6.5" (1.689 m)   Wt 74.2 kg (163 lb 8 oz)   LMP 08/26/2023 (Exact Date)   SpO2 97%   BMI 25.99 kg/m²      Physical Exam  Vitals and nursing note reviewed. Constitutional:       Appearance: Normal appearance. She is normal weight. Cardiovascular:      Rate and Rhythm: Normal rate and regular rhythm. Pulses: Normal pulses. Heart sounds: Normal heart sounds. Pulmonary:      Effort: Pulmonary effort is normal.      Breath sounds: Normal breath sounds. Abdominal:      General: Bowel sounds are normal.      Palpations: Abdomen is soft. Tenderness: There is no abdominal tenderness. Musculoskeletal:         General: Normal range of motion. Skin:     General: Skin is warm and dry. Neurological:      General: No focal deficit present. Mental Status: She is alert and oriented to person, place, and time. Psychiatric:         Mood and Affect: Mood normal.         Behavior: Behavior normal.         Thought Content:  Thought content normal.         Judgment: Judgment normal.

## 2023-12-29 DIAGNOSIS — F41.9 ANXIETY: ICD-10-CM

## 2023-12-29 RX ORDER — SERTRALINE HYDROCHLORIDE 100 MG/1
200 TABLET, FILM COATED ORAL DAILY
Qty: 180 TABLET | Refills: 1 | Status: SHIPPED | OUTPATIENT
Start: 2023-12-29

## 2024-01-12 ENCOUNTER — OFFICE VISIT (OUTPATIENT)
Dept: INTERNAL MEDICINE CLINIC | Facility: OTHER | Age: 34
End: 2024-01-12
Payer: COMMERCIAL

## 2024-01-12 VITALS
HEIGHT: 67 IN | SYSTOLIC BLOOD PRESSURE: 110 MMHG | DIASTOLIC BLOOD PRESSURE: 66 MMHG | OXYGEN SATURATION: 99 % | TEMPERATURE: 98.7 F | BODY MASS INDEX: 24.11 KG/M2 | WEIGHT: 153.6 LBS | HEART RATE: 74 BPM

## 2024-01-12 DIAGNOSIS — J45.20 MILD INTERMITTENT ASTHMA WITHOUT COMPLICATION: ICD-10-CM

## 2024-01-12 DIAGNOSIS — Z98.84 STATUS POST LAPAROSCOPIC SLEEVE GASTRECTOMY: ICD-10-CM

## 2024-01-12 DIAGNOSIS — F33.1 MODERATE EPISODE OF RECURRENT MAJOR DEPRESSIVE DISORDER (HCC): Primary | ICD-10-CM

## 2024-01-12 DIAGNOSIS — F41.9 ANXIETY: ICD-10-CM

## 2024-01-12 DIAGNOSIS — B00.1 HERPES LABIALIS: ICD-10-CM

## 2024-01-12 DIAGNOSIS — G47.09 OTHER INSOMNIA: ICD-10-CM

## 2024-01-12 PROCEDURE — 99214 OFFICE O/P EST MOD 30 MIN: CPT | Performed by: NURSE PRACTITIONER

## 2024-01-12 RX ORDER — BUPROPION HYDROCHLORIDE 75 MG/1
75 TABLET ORAL 2 TIMES DAILY
Qty: 180 TABLET | Refills: 1 | Status: SHIPPED | OUTPATIENT
Start: 2024-01-12

## 2024-01-12 RX ORDER — ZOLPIDEM TARTRATE 5 MG/1
5 TABLET ORAL AS NEEDED
Qty: 30 TABLET | Refills: 0 | Status: SHIPPED | OUTPATIENT
Start: 2024-01-12

## 2024-01-12 RX ORDER — VALACYCLOVIR HYDROCHLORIDE 500 MG/1
500 TABLET, FILM COATED ORAL DAILY
Qty: 90 TABLET | Refills: 1 | Status: SHIPPED | OUTPATIENT
Start: 2024-01-12 | End: 2024-07-10

## 2024-01-12 RX ORDER — BUSPIRONE HYDROCHLORIDE 7.5 MG/1
7.5 TABLET ORAL 2 TIMES DAILY
Qty: 180 TABLET | Refills: 1 | Status: SHIPPED | OUTPATIENT
Start: 2024-01-12

## 2024-01-12 RX ORDER — ALPRAZOLAM 0.5 MG/1
0.5 TABLET ORAL 2 TIMES DAILY PRN
Qty: 30 TABLET | Refills: 0 | Status: SHIPPED | OUTPATIENT
Start: 2024-01-12

## 2024-01-12 NOTE — ASSESSMENT & PLAN NOTE
- controlled on current regimen  - continue sertraline 200mg daily, buspar 7.5mg bid and xanax prn. Uses xanax very seldomly  - continue counseling every 2 weeks

## 2024-01-12 NOTE — ASSESSMENT & PLAN NOTE
- continue ambien prn     Azithromycin Pregnancy And Lactation Text: This medication is considered safe during pregnancy and is also secreted in breast milk.

## 2024-01-12 NOTE — PROGRESS NOTES
Assessment/Plan:    Problem List Items Addressed This Visit       Herpes labialis     - continue valtrex 500mg daily          Relevant Medications    valACYclovir (VALTREX) 500 mg tablet    Anxiety     - controlled on current regimen  - continue sertraline 200mg daily, buspar 7.5mg bid and xanax prn. Uses xanax very seldomly  - continue counseling every 2 weeks         Relevant Medications    busPIRone (BUSPAR) 7.5 mg tablet    ALPRAZolam (XANAX) 0.5 mg tablet    Mild intermittent asthma without complication     - continue albuterol prn         Other insomnia     - continue ambien prn           Relevant Medications    zolpidem (AMBIEN) 5 mg tablet    Moderate episode of recurrent major depressive disorder (HCC) - Primary     - controlled on current regimen  - continue sertraline 200mg daily and wellbutrin 75mg bid   - continue counseling every 2 weeks         Relevant Medications    buPROPion (WELLBUTRIN) 75 mg tablet    busPIRone (BUSPAR) 7.5 mg tablet    ALPRAZolam (XANAX) 0.5 mg tablet    zolpidem (AMBIEN) 5 mg tablet    Status post laparoscopic sleeve gastrectomy     - s/p laparoscopic sleeve gastrectomy 1/17/2023 and followed by Dr Arroyo  - she had lost approx 80 lbs since her surgery                  M*Modal software was used to dictate this note.  It may contain errors with dictating incorrect words or incorrect spelling. Please contact the provider directly with any questions.    Subjective:      Patient ID: Diana Moody is a 33 y.o. female.    HPI    Patient presents today for routine follow up    The following portions of the patient's history were reviewed and updated as appropriate: allergies, current medications, past family history, past medical history, past social history, past surgical history, and problem list.    Review of Systems   Constitutional:  Negative for activity change, appetite change and unexpected weight change.   Psychiatric/Behavioral:  Negative for dysphoric mood and sleep  disturbance. The patient is not nervous/anxious.          Past Medical History:   Diagnosis Date    Abnormal Pap smear of cervix 2016    2016 ASCUS, HPV HR+ (not 16/18), 2017 NILM    Allergic     Anxiety     Asthma     COVID-19 virus infection 11/18/2021    Depression     FHx: migraine headaches     Headache(784.0)     High blood pressure     Hypertension     Migraine 2004    Overdose of drug/medicinal substance, undetermined intent, initial encounter 08/31/2021    Accidental         Current Outpatient Medications:     albuterol (ProAir HFA) 90 mcg/act inhaler, Inhale 2 puffs every 6 (six) hours as needed for wheezing, Disp: 18 g, Rfl: 1    ALPRAZolam (XANAX) 0.5 mg tablet, Take 1 tablet (0.5 mg total) by mouth 2 (two) times a day as needed for anxiety, Disp: 30 tablet, Rfl: 0    buPROPion (WELLBUTRIN) 75 mg tablet, Take 1 tablet (75 mg total) by mouth 2 (two) times a day, Disp: 180 tablet, Rfl: 1    busPIRone (BUSPAR) 7.5 mg tablet, Take 1 tablet (7.5 mg total) by mouth 2 (two) times a day, Disp: 180 tablet, Rfl: 1    methocarbamol (ROBAXIN) 500 mg tablet, Take 1 tablet (500 mg total) by mouth daily at bedtime as needed for muscle spasms, Disp: 30 tablet, Rfl: 1    metoclopramide (REGLAN) 10 mg tablet, 1 tab as needed at onset of migraine, can repeat in 8 hours, can combine with triptan/NSAID, Disp: 20 tablet, Rfl: 3    sertraline (ZOLOFT) 100 mg tablet, Take 2 tablets (200 mg total) by mouth daily, Disp: 180 tablet, Rfl: 1    valACYclovir (VALTREX) 500 mg tablet, Take 1 tablet (500 mg total) by mouth daily, Disp: 90 tablet, Rfl: 1    ZOLMitriptan (ZOMIG) 5 MG tablet, 1/2-1 tab at onset of migraine, can repeat in 2 hours, max 10mg/24 hours, Disp: 12 tablet, Rfl: 3    zolpidem (AMBIEN) 5 mg tablet, Take 1 tablet (5 mg total) by mouth as needed for sleep, Disp: 30 tablet, Rfl: 0    Galcanezumab-gnlm 120 MG/ML SOAJ, 1 subcu injection q30 days (Patient not taking: Reported on 1/12/2024), Disp: 1 mL, Rfl:  "11    Allergies   Allergen Reactions    Clarithromycin Hives    Tetracyclines & Related Hives       Social History   Past Surgical History:   Procedure Laterality Date    DILATION AND CURETTAGE OF UTERUS  2017    EGD      IL INSERTION INTRAUTERINE DEVICE IUD N/A 9/13/2023    Procedure: INSERTION OF INTRAUTERINE DEVICE (IUD), EUA;  Surgeon: Kailyn Salcido MD;  Location: [facility];  Service: Gynecology    IL LAPS GSTRC RSTRICTIV PX LONGITUDINAL GASTRECTOMY N/A 1/17/2023    Procedure: GASTRECTOMY  SLEEVE W/ ROBOT;  Surgeon: Fahad Arroyo MD;  Location: [facility];  Service: Bariatrics    WISDOM TOOTH EXTRACTION      no anesthesia     Family History   Problem Relation Age of Onset    Hypertension Mother     Atrial fibrillation Mother     Stroke Mother     Depression Mother     Hypertension Father     Stroke Maternal Grandmother     Hypertension Maternal Grandmother     Atrial fibrillation Maternal Grandmother     Stroke Maternal Grandfather     Hypertension Maternal Grandfather     Heart disease Maternal Grandfather     Hypertension Paternal Grandfather     Thyroid disease Neg Hx     Diabetes Neg Hx        Objective:  /66 (BP Location: Left arm, Patient Position: Sitting, Cuff Size: Standard)   Pulse 74   Temp 98.7 °F (37.1 °C) (Temporal)   Ht 5' 6.5\" (1.689 m)   Wt 69.7 kg (153 lb 9.6 oz) Comment: per patient  SpO2 99%   BMI 24.42 kg/m²      Physical Exam  Vitals reviewed.   Constitutional:       General: She is not in acute distress.     Appearance: Normal appearance. She is not diaphoretic.   HENT:      Head: Normocephalic and atraumatic.   Eyes:      Extraocular Movements: Extraocular movements intact.      Conjunctiva/sclera: Conjunctivae normal.      Pupils: Pupils are equal, round, and reactive to light.   Cardiovascular:      Rate and Rhythm: Normal rate and regular rhythm.      Heart sounds: Normal heart sounds. No murmur heard.  Pulmonary:      Effort: Pulmonary effort is normal. No " respiratory distress.      Breath sounds: Normal breath sounds. No wheezing, rhonchi or rales.   Neurological:      Mental Status: She is alert and oriented to person, place, and time. Mental status is at baseline.   Psychiatric:         Mood and Affect: Mood normal.         Behavior: Behavior normal.         Thought Content: Thought content normal.         Judgment: Judgment normal.

## 2024-01-12 NOTE — ASSESSMENT & PLAN NOTE
- controlled on current regimen  - continue sertraline 200mg daily and wellbutrin 75mg bid   - continue counseling every 2 weeks

## 2024-01-24 ENCOUNTER — TELEPHONE (OUTPATIENT)
Dept: OBGYN CLINIC | Facility: CLINIC | Age: 34
End: 2024-01-24

## 2024-01-24 DIAGNOSIS — R42 VERTIGO: Primary | ICD-10-CM

## 2024-01-24 RX ORDER — MECLIZINE HYDROCHLORIDE 25 MG/1
25 TABLET ORAL 3 TIMES DAILY PRN
Qty: 30 TABLET | Refills: 0 | Status: SHIPPED | OUTPATIENT
Start: 2024-01-24

## 2024-01-24 NOTE — TELEPHONE ENCOUNTER
Patient not feeling well today-having symptoms of vertigo.  Meclizine sent to pharmacy for symptomatic relief.

## 2024-01-25 ENCOUNTER — TELEMEDICINE (OUTPATIENT)
Dept: INTERNAL MEDICINE CLINIC | Facility: OTHER | Age: 34
End: 2024-01-25
Payer: COMMERCIAL

## 2024-01-25 DIAGNOSIS — J06.9 UPPER RESPIRATORY TRACT INFECTION, UNSPECIFIED TYPE: Primary | ICD-10-CM

## 2024-01-25 PROCEDURE — 99213 OFFICE O/P EST LOW 20 MIN: CPT

## 2024-01-25 RX ORDER — AMOXICILLIN AND CLAVULANATE POTASSIUM 875; 125 MG/1; MG/1
1 TABLET, FILM COATED ORAL EVERY 12 HOURS SCHEDULED
Qty: 14 TABLET | Refills: 0 | Status: SHIPPED | OUTPATIENT
Start: 2024-01-25 | End: 2024-02-01

## 2024-01-25 RX ORDER — BENZONATATE 100 MG/1
100 CAPSULE ORAL 3 TIMES DAILY PRN
Qty: 20 CAPSULE | Refills: 0 | Status: SHIPPED | OUTPATIENT
Start: 2024-01-25

## 2024-01-25 NOTE — PROGRESS NOTES
Virtual Regular Visit    Verification of patient location:    Patient is located at Home in the following state in which I hold an active license [place]      Assessment/Plan:    Problem List Items Addressed This Visit          Respiratory    Upper respiratory tract infection - Primary     Upper respiratory symptoms x 6 days, progressively worsening  Discussed supportive care including Flonase twice daily, second-generation antihistamine daily, decongestant  Also discussed supportive care including saline nasal sprays, steam showers, rest, fluids  Will send Tessalon Perles as needed dry cough  Due to symptoms worsening despite supportive management, will send over Augmentin twice daily x 7 days  Vertigo symptoms likely related to sinus pressure.  Continue supportive care as above.  Patient has meclizine that she can use as needed  Follow-up in 1 to 2 weeks if symptoms not improving  Proceed to the ER with any chest pain, shortness of breath           Relevant Medications    amoxicillin-clavulanate (AUGMENTIN) 875-125 mg per tablet    benzonatate (TESSALON PERLES) 100 mg capsule            Reason for visit is   Chief Complaint   Patient presents with    Cold Like Symptoms      chills, body aches, fever, headache, ear pressure and dizziness. Patient did not feel safe to drive since vertigo.    COVID-19     Neg last night    Virtual Regular Visit          Encounter provider Kassandra Montejo PA-C    Provider located at [facility]  [address] [place] [id number]      Recent Visits  No visits were found meeting these conditions.  Showing recent visits within past 7 days and meeting all other requirements  Today's Visits  Date Type Provider Dept   01/25/24 Telemedicine Kassandra Montejo PA-C [facility]   Showing today's visits and meeting all other requirements  Future Appointments  No visits were  found meeting these conditions.  Showing future appointments within next 150 days and meeting all other requirements       The patient was identified by name and date of birth. Diana Moody was informed that this is a telemedicine visit and that the visit is being conducted through the [employer] platform. She agrees to proceed..  My office door was closed. No one else was in the room.  She acknowledged consent and understanding of privacy and security of the video platform. The patient has agreed to participate and understands they can discontinue the visit at any time.    Patient is aware this is a billable service.     Subjective  Diana Moody is a 33 y.o. female  .      Patient is a 33-year-old female presenting to the office for upper respiratory symptoms x 6 days.  He states that she had a fever on day of onset with Tmax 101.  She is also endorsing congestion, sinus pressure, headaches, postnasal drip, vertigo.  States that symptoms did improve earlier in the week, progressively worsening.  Patient did take a COVID yesterday which was negative    Tx: OTC decongestants, cough medications, tylenol, benadryl, Flonase  Did not  the meclizine yet because she cannot drive    URI   This is a new problem. The current episode started in the past 7 days. The problem has been gradually worsening. The maximum temperature recorded prior to her arrival was 100.4 - 100.9 F. The fever has been present for Less than 1 day. Associated symptoms include congestion, coughing (dry), headaches, rhinorrhea, sinus pain and a sore throat. Pertinent negatives include no abdominal pain, chest pain, diarrhea, nausea, neck pain, vomiting or wheezing.        Past Medical History:   Diagnosis Date    Abnormal Pap smear of cervix 2016 2016 ASCUS, HPV HR+ (not 16/18), 2017 NILM    Allergic     Anxiety     Asthma     COVID-19 virus infection 11/18/2021    Depression     FHx: migraine headaches     Headache(784.0)     High  blood pressure     Hypertension     Migraine 2004    Overdose of drug/medicinal substance, undetermined intent, initial encounter 08/31/2021    Accidental       Past Surgical History:   Procedure Laterality Date    DILATION AND CURETTAGE OF UTERUS  2017    EGD      MD INSERTION INTRAUTERINE DEVICE IUD N/A 9/13/2023    Procedure: INSERTION OF INTRAUTERINE DEVICE (IUD), EUA;  Surgeon: Kailyn Salcido MD;  Location: [facility];  Service: Gynecology    MD LAPS GSTRC RSTRICTIV PX LONGITUDINAL GASTRECTOMY N/A 1/17/2023    Procedure: GASTRECTOMY  SLEEVE W/ ROBOT;  Surgeon: Fahad Arroyo MD;  Location: [facility];  Service: Bariatrics    WISDOM TOOTH EXTRACTION      no anesthesia       Current Outpatient Medications   Medication Sig Dispense Refill    albuterol (ProAir HFA) 90 mcg/act inhaler Inhale 2 puffs every 6 (six) hours as needed for wheezing 18 g 1    ALPRAZolam (XANAX) 0.5 mg tablet Take 1 tablet (0.5 mg total) by mouth 2 (two) times a day as needed for anxiety 30 tablet 0    amoxicillin-clavulanate (AUGMENTIN) 875-125 mg per tablet Take 1 tablet by mouth every 12 (twelve) hours for 7 days 14 tablet 0    benzonatate (TESSALON PERLES) 100 mg capsule Take 1 capsule (100 mg total) by mouth 3 (three) times a day as needed for cough 20 capsule 0    buPROPion (WELLBUTRIN) 75 mg tablet Take 1 tablet (75 mg total) by mouth 2 (two) times a day 180 tablet 1    busPIRone (BUSPAR) 7.5 mg tablet Take 1 tablet (7.5 mg total) by mouth 2 (two) times a day 180 tablet 1    meclizine (ANTIVERT) 25 mg tablet Take 1 tablet (25 mg total) by mouth 3 (three) times a day as needed for dizziness 30 tablet 0    methocarbamol (ROBAXIN) 500 mg tablet Take 1 tablet (500 mg total) by mouth daily at bedtime as needed for muscle spasms 30 tablet 1    metoclopramide (REGLAN) 10 mg tablet 1 tab as needed at onset of migraine, can repeat in 8 hours, can combine with triptan/NSAID 20 tablet 3    sertraline (ZOLOFT) 100 mg tablet Take 2  tablets (200 mg total) by mouth daily 180 tablet 1    valACYclovir (VALTREX) 500 mg tablet Take 1 tablet (500 mg total) by mouth daily 90 tablet 1    ZOLMitriptan (ZOMIG) 5 MG tablet 1/2-1 tab at onset of migraine, can repeat in 2 hours, max 10mg/24 hours 12 tablet 3    zolpidem (AMBIEN) 5 mg tablet Take 1 tablet (5 mg total) by mouth as needed for sleep 30 tablet 0    Galcanezumab-gnlm 120 MG/ML SOAJ 1 subcu injection q30 days (Patient not taking: Reported on 1/12/2024) 1 mL 11     No current facility-administered medications for this visit.        Allergies   Allergen Reactions    Clarithromycin Hives    Tetracyclines & Related Hives       Review of Systems   Constitutional:  Positive for chills, fatigue and fever.   HENT:  Positive for congestion, postnasal drip, rhinorrhea, sinus pressure, sinus pain and sore throat. Negative for trouble swallowing.    Eyes:  Negative for pain and visual disturbance.   Respiratory:  Positive for cough (dry). Negative for chest tightness, shortness of breath and wheezing.    Cardiovascular:  Negative for chest pain, palpitations and leg swelling.   Gastrointestinal:  Negative for abdominal pain, constipation, diarrhea, nausea and vomiting.   Musculoskeletal:  Negative for neck pain and neck stiffness.   Neurological:  Positive for dizziness and headaches. Negative for weakness, light-headedness and numbness.       Video Exam    There were no vitals filed for this visit.    Physical Exam  Vitals and nursing note reviewed.   Constitutional:       General: She is not in acute distress.     Appearance: Normal appearance. She is not ill-appearing or diaphoretic.   HENT:      Head: Normocephalic and atraumatic.      Right Ear: External ear normal.      Left Ear: External ear normal.      Nose: Nose normal.      Mouth/Throat:      Comments: Patient notes no erythema or tenderness upon visual inspection  Eyes:      General: No scleral icterus.        Right eye: No discharge.         Left  eye: No discharge.      Conjunctiva/sclera: Conjunctivae normal.   Pulmonary:      Effort: Pulmonary effort is normal. No respiratory distress.   Skin:     Coloration: Skin is not pale.      Findings: No erythema.   Neurological:      Mental Status: She is alert and oriented to person, place, and time. Mental status is at baseline.      Coordination: Coordination normal.   Psychiatric:         Mood and Affect: Mood normal.         Behavior: Behavior normal.          Visit Time  Total Visit Duration: 15 minutes

## 2024-01-25 NOTE — ASSESSMENT & PLAN NOTE
Upper respiratory symptoms x 6 days, progressively worsening  Discussed supportive care including Flonase twice daily, second-generation antihistamine daily, decongestant  Also discussed supportive care including saline nasal sprays, steam showers, rest, fluids  Will send Tessalon Perles as needed dry cough  Due to symptoms worsening despite supportive management, will send over Augmentin twice daily x 7 days  Vertigo symptoms likely related to sinus pressure.  Continue supportive care as above.  Patient has meclizine that she can use as needed  Follow-up in 1 to 2 weeks if symptoms not improving  Proceed to the ER with any chest pain, shortness of breath

## 2024-03-20 ENCOUNTER — COSMETIC (OUTPATIENT)
Dept: PLASTIC SURGERY | Facility: CLINIC | Age: 34
End: 2024-03-20

## 2024-03-20 DIAGNOSIS — Z41.1 ENCOUNTER FOR COSMETIC PROCEDURE: Primary | ICD-10-CM

## 2024-03-20 PROCEDURE — BOTOX1U PR BOTOX BY THE UNIT: Performed by: STUDENT IN AN ORGANIZED HEALTH CARE EDUCATION/TRAINING PROGRAM

## 2024-03-20 PROCEDURE — BOTOX2 TWO AREAS OR 50 UNITS: Performed by: STUDENT IN AN ORGANIZED HEALTH CARE EDUCATION/TRAINING PROGRAM

## 2024-03-20 NOTE — PROGRESS NOTES
Botox Consult     First time?: no, had before with other provider  Allergies: tetracyclin, braxin  Blood thinners: none   Pregnant: no  Neuromuscular conditions: no    Patient has had botox, interested in maintenance. Particular areas of concern:  11s and forehead rhytids     Will proceed with 26 units of botox     Risks and benefits discussed, patient agreed to proceed.    12 units to glabellum  8 units to central forehead  3 units to each lateral forehead area (total 6 units)     Total 26 units, 30% off     Patient tolerated well, f/u in 3 months        Dragan [place] MD   [facility]   [address] [place] [address], [address]   [place] [address]   Office: [phone]

## 2024-03-27 ENCOUNTER — OFFICE VISIT (OUTPATIENT)
Dept: BARIATRICS | Facility: CLINIC | Age: 34
End: 2024-03-27
Payer: COMMERCIAL

## 2024-03-27 VITALS
DIASTOLIC BLOOD PRESSURE: 70 MMHG | HEIGHT: 67 IN | BODY MASS INDEX: 24.48 KG/M2 | SYSTOLIC BLOOD PRESSURE: 120 MMHG | TEMPERATURE: 96.8 F | WEIGHT: 156 LBS | OXYGEN SATURATION: 99 % | HEART RATE: 65 BPM

## 2024-03-27 DIAGNOSIS — Z48.815 ENCOUNTER FOR SURGICAL AFTERCARE FOLLOWING SURGERY OF DIGESTIVE SYSTEM: Primary | ICD-10-CM

## 2024-03-27 DIAGNOSIS — Z98.84 BARIATRIC SURGERY STATUS: ICD-10-CM

## 2024-03-27 DIAGNOSIS — K91.2 POSTSURGICAL MALABSORPTION: ICD-10-CM

## 2024-03-27 PROCEDURE — 99214 OFFICE O/P EST MOD 30 MIN: CPT | Performed by: NURSE PRACTITIONER

## 2024-03-27 NOTE — PROGRESS NOTES
Assessment/Plan:     Patient ID: Diana Moody is a 33 y.o. female.     Bariatric Surgery Status/BMI 24    -s/p Vertical Sleeve Gastrectomy with Dr. Arroyo on 01/17/2023. Presents to the office today for an annual post op visit. Overall doing very well, tolerating a regular diet. Denies having any abdominal pain, N/V/D/C, regurgitation, reflux or dysphagia. Taking her MVI daily. Was able to stop her PPI without any difficulty/issues.     PLAN:     - Routine follow up in 6 months for 18 month PO visit.   - Continue with healthy lifestyle, adequate protein intake of 60 gm, fluid intake of at least 64 oz.   - Continue with MVI daily.   - Activity as tolerated.   - Labs ordered and will adjust accordingly if any deficiency.   - Follow up with RD and SW as needed.       Continued/Maintain healthy weight loss with good nutrition intakes.  Adequate hydration with at least 64oz. fluid intake.  Follow diet as discussed.  Follow vitamin and mineral recommendations as reviewed with you.  Exercise as tolerated.    Colonoscopy referral made: n/a  Mammogram - N/A    Follow-up in 6 MONTHS for 18 month PO visit. We kindly ask that your arrive 15 minutes before your scheduled appointment time with your provider to allow our staff to room you, get your vital signs and update your chart.    Get lab work done prior to visit. Please call the office if you need a script.  It is recommended to check with your insurance BEFORE getting labs done to make sure they are covered by your policy.      Call our office if you have any problems with abdominal pain especially associated with fever, chills, nausea, vomiting or any other concerns.    All  Post-bariatric surgery patients should be aware that very small quantities of any alcohol can cause impairment and it is very possible not to feel the effect. The effect can be in the system for several hours.  It is also a stomach irritant.     It is advised to AVOID alcohol, Nonsteroidal  antiinflammatory drugs (NSAIDS) and nicotine of all forms . Any of these can cause stomach irritation/pain.    Discussed the effects of alcohol on a bariatric patient and the increased impairment risk.     Keep up the good work!     Postsurgical Malabsorption   -At risk for malabsorption of vitamins/minerals secondary to malabsorption and restriction of intake from bariatric surgery  -Currently taking adequate postop bariatric surgery vitamin supplementation  -Last set of bariatric labs completed on 05/23/2023 and showed WNL   -Next set of bariatric labs ordered for approximately 2 weeks  -Patient received education about the importance of adhering to a lifelong supplementation regimen to avoid vitamin/mineral deficiencies      Diagnoses and all orders for this visit:    Encounter for surgical aftercare following surgery of digestive system  -     CBC; Future  -     Comprehensive metabolic panel; Future  -     Folate; Future  -     Iron Panel (Includes Ferritin, Iron Sat%, Iron, and TIBC); Future  -     PTH, intact; Future  -     Vitamin A; Future  -     Vitamin B1, whole blood; Future  -     Vitamin B12; Future  -     Vitamin D 25 hydroxy; Future  -     Zinc; Future    Bariatric surgery status  -     CBC; Future  -     Comprehensive metabolic panel; Future  -     Folate; Future  -     Iron Panel (Includes Ferritin, Iron Sat%, Iron, and TIBC); Future  -     PTH, intact; Future  -     Vitamin A; Future  -     Vitamin B1, whole blood; Future  -     Vitamin B12; Future  -     Vitamin D 25 hydroxy; Future  -     Zinc; Future    Postsurgical malabsorption  -     CBC; Future  -     Comprehensive metabolic panel; Future  -     Folate; Future  -     Iron Panel (Includes Ferritin, Iron Sat%, Iron, and TIBC); Future  -     PTH, intact; Future  -     Vitamin A; Future  -     Vitamin B1, whole blood; Future  -     Vitamin B12; Future  -     Vitamin D 25 hydroxy; Future  -     Zinc; Future    BMI 24.0-24.9, adult  -     CBC;  Future  -     Comprehensive metabolic panel; Future  -     Folate; Future  -     Iron Panel (Includes Ferritin, Iron Sat%, Iron, and TIBC); Future  -     PTH, intact; Future  -     Vitamin A; Future  -     Vitamin B1, whole blood; Future  -     Vitamin B12; Future  -     Vitamin D 25 hydroxy; Future  -     Zinc; Future         Subjective:      Patient ID: Diana Moody is a 33 y.o. female.    -s/p Vertical Sleeve Gastrectomy with Dr. Arroyo on 01/17/2023. Presents to the office today for an annual post op visit. Overall doing very well, tolerating a regular diet. Denies having any abdominal pain, N/V/D/C, regurgitation, reflux or dysphagia. Taking her MVI daily.     Initial: 250 lbs  Current: 156 lbs  EWL: (Weight loss is ahead of schedule at this post surgical period.)  Justus: current   Current BMI is Body mass index is 24.8 kg/m².    Tolerating a regular diet-yes  Eating at least 60 grams of protein per day-yes  Following 30/60 minute rule with liquids-yes  Drinking at least 64 ounces of fluid per day-yes  Drinking carbonated beverages-no  Sufficient exercise- walking the dogs.   Using NSAIDs regularly-no  Using nicotine-no  Using alcohol-occasionally   Supplements: Multivitamins and Calcium (calcium intake via foods)    EWL is 102%, which places the patient ahead of schedule for expected post surgical weight loss at this time.     The following portions of the patient's history were reviewed and updated as appropriate: allergies, current medications, past family history, past medical history, past social history, past surgical history and problem list.    Review of Systems   Constitutional: Negative.    Respiratory: Negative.     Cardiovascular: Negative.    Gastrointestinal: Negative.    Musculoskeletal: Negative.    Neurological: Negative.    Psychiatric/Behavioral: Negative.           Objective:    /70 (BP Location: Left arm, Patient Position: Sitting, Cuff Size: Large)   Pulse 65   Temp (!) 96.8 °F  "(36 °C) (Tympanic)   Ht 5' 6.5\" (1.689 m)   Wt 70.8 kg (156 lb)   SpO2 99%   BMI 24.80 kg/m²      Physical Exam  Vitals and nursing note reviewed.   Constitutional:       Appearance: Normal appearance. She is normal weight.   Cardiovascular:      Rate and Rhythm: Normal rate and regular rhythm.      Pulses: Normal pulses.      Heart sounds: Normal heart sounds.   Pulmonary:      Effort: Pulmonary effort is normal.      Breath sounds: Normal breath sounds.   Abdominal:      General: Bowel sounds are normal.      Palpations: Abdomen is soft.      Tenderness: There is no abdominal tenderness.   Musculoskeletal:         General: Normal range of motion.   Skin:     General: Skin is warm and dry.   Neurological:      General: No focal deficit present.      Mental Status: She is alert and oriented to person, place, and time.   Psychiatric:         Mood and Affect: Mood normal.         Behavior: Behavior normal.         Thought Content: Thought content normal.         Judgment: Judgment normal.             "

## 2024-04-04 DIAGNOSIS — R11.0 NAUSEA: Primary | ICD-10-CM

## 2024-04-04 RX ORDER — ONDANSETRON 4 MG/1
4 TABLET, FILM COATED ORAL EVERY 8 HOURS PRN
Qty: 30 TABLET | Refills: 2 | Status: SHIPPED | OUTPATIENT
Start: 2024-04-04

## 2024-05-08 DIAGNOSIS — J06.9 UPPER RESPIRATORY TRACT INFECTION, UNSPECIFIED TYPE: ICD-10-CM

## 2024-05-08 RX ORDER — AMOXICILLIN AND CLAVULANATE POTASSIUM 875; 125 MG/1; MG/1
1 TABLET, FILM COATED ORAL EVERY 12 HOURS SCHEDULED
Qty: 10 TABLET | Refills: 0 | Status: SHIPPED | OUTPATIENT
Start: 2024-05-08 | End: 2024-05-13

## 2024-05-08 RX ORDER — BENZONATATE 100 MG/1
100 CAPSULE ORAL 3 TIMES DAILY PRN
Qty: 20 CAPSULE | Refills: 2 | Status: SHIPPED | OUTPATIENT
Start: 2024-05-08

## 2024-06-10 ENCOUNTER — APPOINTMENT (OUTPATIENT)
Dept: LAB | Facility: CLINIC | Age: 34
End: 2024-06-10
Payer: COMMERCIAL

## 2024-06-10 DIAGNOSIS — Z48.815 ENCOUNTER FOR SURGICAL AFTERCARE FOLLOWING SURGERY OF DIGESTIVE SYSTEM: ICD-10-CM

## 2024-06-10 DIAGNOSIS — Z00.8 HEALTH EXAMINATION IN POPULATION SURVEY: ICD-10-CM

## 2024-06-10 DIAGNOSIS — K91.2 POSTSURGICAL MALABSORPTION: ICD-10-CM

## 2024-06-10 DIAGNOSIS — Z98.84 BARIATRIC SURGERY STATUS: ICD-10-CM

## 2024-06-10 LAB
25(OH)D3 SERPL-MCNC: 42 NG/ML (ref 30–100)
ALBUMIN SERPL BCP-MCNC: 4.7 G/DL (ref 3.5–5)
ALP SERPL-CCNC: 53 U/L (ref 34–104)
ALT SERPL W P-5'-P-CCNC: 11 U/L (ref 7–52)
ANION GAP SERPL CALCULATED.3IONS-SCNC: 7 MMOL/L (ref 4–13)
AST SERPL W P-5'-P-CCNC: 12 U/L (ref 13–39)
BILIRUB SERPL-MCNC: 0.66 MG/DL (ref 0.2–1)
BUN SERPL-MCNC: 16 MG/DL (ref 5–25)
CALCIUM SERPL-MCNC: 9.5 MG/DL (ref 8.4–10.2)
CHLORIDE SERPL-SCNC: 104 MMOL/L (ref 96–108)
CHOLEST SERPL-MCNC: 178 MG/DL
CO2 SERPL-SCNC: 28 MMOL/L (ref 21–32)
CREAT SERPL-MCNC: 0.74 MG/DL (ref 0.6–1.3)
ERYTHROCYTE [DISTWIDTH] IN BLOOD BY AUTOMATED COUNT: 12.4 % (ref 11.6–15.1)
EST. AVERAGE GLUCOSE BLD GHB EST-MCNC: 94 MG/DL
FERRITIN SERPL-MCNC: 51 NG/ML (ref 11–307)
FOLATE SERPL-MCNC: 12.8 NG/ML
GFR SERPL CREATININE-BSD FRML MDRD: 108 ML/MIN/1.73SQ M
GLUCOSE P FAST SERPL-MCNC: 83 MG/DL (ref 65–99)
HBA1C MFR BLD: 4.9 %
HCT VFR BLD AUTO: 38.8 % (ref 34.8–46.1)
HDLC SERPL-MCNC: 72 MG/DL
HGB BLD-MCNC: 13.1 G/DL (ref 11.5–15.4)
IRON SATN MFR SERPL: 42 % (ref 15–50)
IRON SERPL-MCNC: 152 UG/DL (ref 50–212)
LDLC SERPL CALC-MCNC: 95 MG/DL (ref 0–100)
MCH RBC QN AUTO: 31.1 PG (ref 26.8–34.3)
MCHC RBC AUTO-ENTMCNC: 33.8 G/DL (ref 31.4–37.4)
MCV RBC AUTO: 92 FL (ref 82–98)
NONHDLC SERPL-MCNC: 106 MG/DL
PLATELET # BLD AUTO: 208 THOUSANDS/UL (ref 149–390)
PMV BLD AUTO: 10.1 FL (ref 8.9–12.7)
POTASSIUM SERPL-SCNC: 3.9 MMOL/L (ref 3.5–5.3)
PROT SERPL-MCNC: 7.3 G/DL (ref 6.4–8.4)
PTH-INTACT SERPL-MCNC: 67.7 PG/ML (ref 12–88)
RBC # BLD AUTO: 4.21 MILLION/UL (ref 3.81–5.12)
SODIUM SERPL-SCNC: 139 MMOL/L (ref 135–147)
TIBC SERPL-MCNC: 360 UG/DL (ref 250–450)
TRIGL SERPL-MCNC: 53 MG/DL
UIBC SERPL-MCNC: 208 UG/DL (ref 155–355)
VIT B12 SERPL-MCNC: 250 PG/ML (ref 180–914)
WBC # BLD AUTO: 4.47 THOUSAND/UL (ref 4.31–10.16)

## 2024-06-10 PROCEDURE — 36415 COLL VENOUS BLD VENIPUNCTURE: CPT

## 2024-06-10 PROCEDURE — 83036 HEMOGLOBIN GLYCOSYLATED A1C: CPT

## 2024-06-10 PROCEDURE — 83540 ASSAY OF IRON: CPT

## 2024-06-10 PROCEDURE — 85027 COMPLETE CBC AUTOMATED: CPT

## 2024-06-10 PROCEDURE — 80053 COMPREHEN METABOLIC PANEL: CPT

## 2024-06-10 PROCEDURE — 83550 IRON BINDING TEST: CPT

## 2024-06-10 PROCEDURE — 83970 ASSAY OF PARATHORMONE: CPT

## 2024-06-10 PROCEDURE — 80061 LIPID PANEL: CPT

## 2024-06-10 PROCEDURE — 82306 VITAMIN D 25 HYDROXY: CPT

## 2024-06-10 PROCEDURE — 84590 ASSAY OF VITAMIN A: CPT

## 2024-06-10 PROCEDURE — 82728 ASSAY OF FERRITIN: CPT

## 2024-06-10 PROCEDURE — 82746 ASSAY OF FOLIC ACID SERUM: CPT

## 2024-06-10 PROCEDURE — 84425 ASSAY OF VITAMIN B-1: CPT

## 2024-06-10 PROCEDURE — 82607 VITAMIN B-12: CPT

## 2024-06-10 PROCEDURE — 84630 ASSAY OF ZINC: CPT

## 2024-06-12 LAB — ZINC SERPL-MCNC: 81 UG/DL (ref 44–115)

## 2024-06-13 LAB — VIT A SERPL-MCNC: 40.7 UG/DL (ref 18.9–57.3)

## 2024-06-14 LAB — VIT B1 BLD-SCNC: 127.8 NMOL/L (ref 66.5–200)

## 2024-07-17 ENCOUNTER — TELEMEDICINE (OUTPATIENT)
Dept: INTERNAL MEDICINE CLINIC | Facility: OTHER | Age: 34
End: 2024-07-17
Payer: COMMERCIAL

## 2024-07-17 ENCOUNTER — TELEPHONE (OUTPATIENT)
Dept: INTERNAL MEDICINE CLINIC | Facility: OTHER | Age: 34
End: 2024-07-17

## 2024-07-17 DIAGNOSIS — Z98.84 STATUS POST LAPAROSCOPIC SLEEVE GASTRECTOMY: ICD-10-CM

## 2024-07-17 DIAGNOSIS — F41.9 ANXIETY: ICD-10-CM

## 2024-07-17 DIAGNOSIS — G47.09 OTHER INSOMNIA: ICD-10-CM

## 2024-07-17 DIAGNOSIS — F33.1 MODERATE EPISODE OF RECURRENT MAJOR DEPRESSIVE DISORDER (HCC): ICD-10-CM

## 2024-07-17 DIAGNOSIS — K91.2 POSTSURGICAL MALABSORPTION: ICD-10-CM

## 2024-07-17 DIAGNOSIS — J45.20 MILD INTERMITTENT ASTHMA WITHOUT COMPLICATION: Primary | ICD-10-CM

## 2024-07-17 PROBLEM — J06.9 UPPER RESPIRATORY TRACT INFECTION: Status: RESOLVED | Noted: 2024-01-25 | Resolved: 2024-07-17

## 2024-07-17 PROCEDURE — 99214 OFFICE O/P EST MOD 30 MIN: CPT | Performed by: NURSE PRACTITIONER

## 2024-07-17 RX ORDER — ALPRAZOLAM 0.5 MG/1
0.5 TABLET ORAL 2 TIMES DAILY PRN
Qty: 30 TABLET | Refills: 0 | Status: SHIPPED | OUTPATIENT
Start: 2024-07-17

## 2024-07-17 RX ORDER — MULTIVITAMIN
1 TABLET ORAL DAILY
COMMUNITY

## 2024-07-17 NOTE — ASSESSMENT & PLAN NOTE
- controlled on current regimen  - continue sertraline 200mg daily, buspar 7.5mg bid and xanax prn.

## 2024-07-17 NOTE — PROGRESS NOTES
Virtual Regular Visit  Name: Diana Moody      : 1990      MRN: [id number]  Encounter Provider: ELAINE Franco  Encounter Date: 2024   Encounter department: [facility]    Verification of patient location:    Patient is located at Home in the following state in which I hold an active license [place]    Assessment & Plan   1. Mild intermittent asthma without complication  Assessment & Plan:  - controlled with albuterol prn  2. Anxiety  Assessment & Plan:  - controlled on current regimen  - continue sertraline 200mg daily, buspar 7.5mg bid and xanax prn.     Orders:  -     ALPRAZolam (XANAX) 0.5 mg tablet; Take 1 tablet (0.5 mg total) by mouth 2 (two) times a day as needed for anxiety  3. Moderate episode of recurrent major depressive disorder (HCC)  Assessment & Plan:  - mood stable on current regimen  - continue sertraline 200mg daily and wellbutrin 75mg bid   - continue counseling   - follow up 6 months  4. Status post laparoscopic sleeve gastrectomy  Assessment & Plan:  - s/p laparoscopic sleeve gastrectomy 2023 and followed by Dr Arroyo  5. Other insomnia  Assessment & Plan:  - continue ambien prn    6. Postsurgical malabsorption  Assessment & Plan:  - B12 low on recent labs, has since started daily B12 supplement      Depression Screening Follow-up Plan: Patient's depression screening was positive with a PHQ-2 score of . Their PHQ-9 score was 8. Patient assessed for underlying major depression. They have no active suicidal ideations. Brief counseling provided and recommend additional follow-up/re-evaluation next office visit.  Continue current regimen and counseling     Encounter provider ELAINE Franco    The patient was identified by name and date of birth. Diana Moody was informed that this is a telemedicine visit and that the visit is being conducted through the Epic Embedded platform. She agrees to proceed..  My office door  was closed. No one else was in the room.  She acknowledged consent and understanding of privacy and security of the video platform. The patient has agreed to participate and understands they can discontinue the visit at any time.    Patient is aware this is a billable service.     History of Present Illness     HPI    Patient presents today for 6 month follow up  No new labs for review   She is s/p laparoscopic sleeve gastrectomy 1/17/2023 and followed by Dr Arroyo. She continues to do well, reports she is now 148lbs.   Her b12 was slightly low so she has since started a daily B12 supplement     Anxiety/depression - she is currently managed on sertraline 200mg daily and buspar 7.5mg bid, wellbutrin 75mg bid. She has xanax that she uses very infrequently. She was recently in [place] in an active war zone and therefore had been using her xanax more frequently for a period of time.     Asthma - controlled with albuterol prn    Insomnia - controlled with ambien 5mg prn     Review of Systems   Respiratory:  Negative for cough, chest tightness, shortness of breath and wheezing.    Psychiatric/Behavioral:  Negative for dysphoric mood, self-injury, sleep disturbance and suicidal ideas. The patient is not nervous/anxious.      Medical History Reviewed by provider this encounter:       Past Medical History   Past Medical History:   Diagnosis Date    Abnormal Pap smear of cervix 2016    2016 ASCUS, HPV HR+ (not 16/18), 2017 NILM    Allergic     Anxiety     Asthma     COVID-19 virus infection 11/18/2021    Depression     FHx: migraine headaches     Headache(784.0)     High blood pressure     Hypertension     Migraine 2004    Overdose of drug/medicinal substance, undetermined intent, initial encounter 08/31/2021    Accidental     Past Surgical History:   Procedure Laterality Date    DILATION AND CURETTAGE OF UTERUS  2017    EGD      IN INSERTION INTRAUTERINE DEVICE IUD N/A 9/13/2023    Procedure: INSERTION OF INTRAUTERINE DEVICE  (IUD), EUA;  Surgeon: Kailyn Salcido MD;  Location: [facility];  Service: Gynecology    FL LAPS GSTRC RSTRICTIV PX LONGITUDINAL GASTRECTOMY N/A 1/17/2023    Procedure: GASTRECTOMY  SLEEVE W/ ROBOT;  Surgeon: Fahad Arroyo MD;  Location: [facility];  Service: Bariatrics    WISDOM TOOTH EXTRACTION      no anesthesia     Family History   Problem Relation Age of Onset    Hypertension Mother     Atrial fibrillation Mother     Stroke Mother     Depression Mother     Hypertension Father     Stroke Maternal Grandmother     Hypertension Maternal Grandmother     Atrial fibrillation Maternal Grandmother     Stroke Maternal Grandfather     Hypertension Maternal Grandfather     Heart disease Maternal Grandfather     Hypertension Paternal Grandfather     Thyroid disease Neg Hx     Diabetes Neg Hx      Current Outpatient Medications on File Prior to Visit   Medication Sig Dispense Refill    albuterol (ProAir HFA) 90 mcg/act inhaler Inhale 2 puffs every 6 (six) hours as needed for wheezing 18 g 1    buPROPion (WELLBUTRIN) 75 mg tablet Take 1 tablet (75 mg total) by mouth 2 (two) times a day 180 tablet 1    busPIRone (BUSPAR) 7.5 mg tablet Take 1 tablet (7.5 mg total) by mouth 2 (two) times a day 180 tablet 1    Galcanezumab-gnlm 120 MG/ML SOAJ 1 subcu injection q30 days 1 mL 11    methocarbamol (ROBAXIN) 500 mg tablet Take 1 tablet (500 mg total) by mouth daily at bedtime as needed for muscle spasms 30 tablet 1    metoclopramide (REGLAN) 10 mg tablet 1 tab as needed at onset of migraine, can repeat in 8 hours, can combine with triptan/NSAID 20 tablet 3    Multiple Vitamin (multivitamin) tablet Take 1 tablet by mouth daily Bariatric multivitamin      sertraline (ZOLOFT) 100 mg tablet Take 2 tablets (200 mg total) by mouth daily 180 tablet 1    valACYclovir (VALTREX) 500 mg tablet Take 1 tablet (500 mg total) by mouth daily 90 tablet 1    ZOLMitriptan (ZOMIG) 5 MG tablet 1/2-1 tab at onset of migraine, can repeat in 2  hours, max 10mg/24 hours 12 tablet 3    zolpidem (AMBIEN) 5 mg tablet Take 1 tablet (5 mg total) by mouth as needed for sleep 30 tablet 0    [DISCONTINUED] ALPRAZolam (XANAX) 0.5 mg tablet Take 1 tablet (0.5 mg total) by mouth 2 (two) times a day as needed for anxiety 30 tablet 0    [DISCONTINUED] benzonatate (TESSALON PERLES) 100 mg capsule Take 1 capsule (100 mg total) by mouth 3 (three) times a day as needed for cough 20 capsule 2    [DISCONTINUED] meclizine (ANTIVERT) 25 mg tablet Take 1 tablet (25 mg total) by mouth 3 (three) times a day as needed for dizziness (Patient not taking: Reported on 3/27/2024) 30 tablet 0    [DISCONTINUED] ondansetron (ZOFRAN) 4 mg tablet Take 1 tablet (4 mg total) by mouth every 8 (eight) hours as needed for nausea or vomiting 30 tablet 2     No current facility-administered medications on file prior to visit.     Allergies   Allergen Reactions    Clarithromycin Hives    Tetracyclines & Related Hives      Current Outpatient Medications on File Prior to Visit   Medication Sig Dispense Refill    albuterol (ProAir HFA) 90 mcg/act inhaler Inhale 2 puffs every 6 (six) hours as needed for wheezing 18 g 1    buPROPion (WELLBUTRIN) 75 mg tablet Take 1 tablet (75 mg total) by mouth 2 (two) times a day 180 tablet 1    busPIRone (BUSPAR) 7.5 mg tablet Take 1 tablet (7.5 mg total) by mouth 2 (two) times a day 180 tablet 1    Galcanezumab-gnlm 120 MG/ML SOAJ 1 subcu injection q30 days 1 mL 11    methocarbamol (ROBAXIN) 500 mg tablet Take 1 tablet (500 mg total) by mouth daily at bedtime as needed for muscle spasms 30 tablet 1    metoclopramide (REGLAN) 10 mg tablet 1 tab as needed at onset of migraine, can repeat in 8 hours, can combine with triptan/NSAID 20 tablet 3    Multiple Vitamin (multivitamin) tablet Take 1 tablet by mouth daily Bariatric multivitamin      sertraline (ZOLOFT) 100 mg tablet Take 2 tablets (200 mg total) by mouth daily 180 tablet 1    valACYclovir (VALTREX) 500 mg tablet  Take 1 tablet (500 mg total) by mouth daily 90 tablet 1    ZOLMitriptan (ZOMIG) 5 MG tablet 1/2-1 tab at onset of migraine, can repeat in 2 hours, max 10mg/24 hours 12 tablet 3    zolpidem (AMBIEN) 5 mg tablet Take 1 tablet (5 mg total) by mouth as needed for sleep 30 tablet 0    [DISCONTINUED] ALPRAZolam (XANAX) 0.5 mg tablet Take 1 tablet (0.5 mg total) by mouth 2 (two) times a day as needed for anxiety 30 tablet 0    [DISCONTINUED] benzonatate (TESSALON PERLES) 100 mg capsule Take 1 capsule (100 mg total) by mouth 3 (three) times a day as needed for cough 20 capsule 2    [DISCONTINUED] meclizine (ANTIVERT) 25 mg tablet Take 1 tablet (25 mg total) by mouth 3 (three) times a day as needed for dizziness (Patient not taking: Reported on 3/27/2024) 30 tablet 0    [DISCONTINUED] ondansetron (ZOFRAN) 4 mg tablet Take 1 tablet (4 mg total) by mouth every 8 (eight) hours as needed for nausea or vomiting 30 tablet 2     No current facility-administered medications on file prior to visit.      Social History     Tobacco Use    Smoking status: Never    Smokeless tobacco: Never   Vaping Use    Vaping status: Never Used   Substance and Sexual Activity    Alcohol use: Yes     Alcohol/week: 4.0 standard drinks of alcohol     Types: 2 Glasses of wine, 2 Cans of beer per week     Comment: Socially    Drug use: No    Sexual activity: Yes     Partners: Female, Male     Birth control/protection: I.U.D.     Objective     There were no vitals taken for this visit.  Physical Exam  Vitals reviewed.   Constitutional:       General: She is not in acute distress.     Appearance: Normal appearance. She is not diaphoretic.   HENT:      Head: Normocephalic and atraumatic.   Eyes:      Extraocular Movements: Extraocular movements intact.      Conjunctiva/sclera: Conjunctivae normal.      Pupils: Pupils are equal, round, and reactive to light.   Pulmonary:      Effort: Pulmonary effort is normal. No respiratory distress.   Neurological:       Mental Status: She is alert and oriented to person, place, and time. Mental status is at baseline.   Psychiatric:         Mood and Affect: Mood normal.         Behavior: Behavior normal.         Thought Content: Thought content normal.         Judgment: Judgment normal.         Visit Time  Total Visit Duration: 6 minutes

## 2024-07-17 NOTE — ASSESSMENT & PLAN NOTE
- mood stable on current regimen  - continue sertraline 200mg daily and wellbutrin 75mg bid   - continue counseling   - follow up 6 months

## 2024-07-17 NOTE — TELEPHONE ENCOUNTER
Called the patient and asked if she wanted to make an appointment for her 6 month follow up.  Patient states that she will call back later if that is ok.  Please schedule her 6 month follow up with Yumi Navarro in the [place] office.

## 2024-08-20 ENCOUNTER — COSMETIC (OUTPATIENT)
Dept: PLASTIC SURGERY | Facility: CLINIC | Age: 34
End: 2024-08-20

## 2024-08-20 DIAGNOSIS — Z41.1 ENCOUNTER FOR COSMETIC PROCEDURE: Primary | ICD-10-CM

## 2024-08-20 PROCEDURE — BOTOX2 TWO AREAS OR 50 UNITS: Performed by: PLASTIC SURGERY

## 2024-08-20 PROCEDURE — RECHECK: Performed by: PLASTIC SURGERY

## 2024-08-20 NOTE — PROGRESS NOTES
Patient is a 33yr F who presents for treatment of facial aging, She is interested in non-surgical treatment. She has had treatment with Botox in the past and desires to continue. She is an appropriate candidate due to presence of glabellar and forehead rhytids. She also states that it helps her migraine headaches. We discussed the risks, benefits, and alternatives of Botox including the nature of the treatment, its mechanism of action, the risk of bleeding, bruising, infection, scarring, asymmetry, poor aesthetic result, under correction, over-correction, brow ptosis, eyelid ptosis, need for repeat treatment, need for revision, need for touchup, I discussed with her that Botox typically takes approximately 5-7 days for desired affect and lasts approximately 3-4 months.  All the patient's questions were answered to her satisfaction, informed consent obtained verbally. Post-procedure instructions given.    Procedure: The target areas were cleansed with alcohol.  10 units injected into the glabella in 5 injection sites- the injections were done in an aseptic conditions directly into the muscle belly. Additionally,  15 units were injected into the forehead area, keeping at least 2cm above the orbital rim,directly into the muscle.  The patient tolerated the procedure well.  She can follow-up in 3-4 months or sooner p.r.n. for treatment.

## 2024-08-26 DIAGNOSIS — G44.209 TENSION HEADACHE: ICD-10-CM

## 2024-08-26 DIAGNOSIS — F41.9 ANXIETY: ICD-10-CM

## 2024-08-26 DIAGNOSIS — G47.09 OTHER INSOMNIA: ICD-10-CM

## 2024-08-27 RX ORDER — SERTRALINE HYDROCHLORIDE 100 MG/1
200 TABLET, FILM COATED ORAL DAILY
Qty: 180 TABLET | Refills: 1 | Status: SHIPPED | OUTPATIENT
Start: 2024-08-27

## 2024-08-28 RX ORDER — ZOLPIDEM TARTRATE 5 MG/1
5 TABLET ORAL AS NEEDED
Qty: 30 TABLET | Refills: 0 | Status: SHIPPED | OUTPATIENT
Start: 2024-08-28

## 2024-08-28 RX ORDER — METHOCARBAMOL 500 MG/1
500 TABLET, FILM COATED ORAL
Qty: 30 TABLET | Refills: 0 | Status: SHIPPED | OUTPATIENT
Start: 2024-08-28

## 2024-10-08 ENCOUNTER — OFFICE VISIT (OUTPATIENT)
Dept: BARIATRICS | Facility: CLINIC | Age: 34
End: 2024-10-08
Payer: COMMERCIAL

## 2024-10-08 VITALS
BODY MASS INDEX: 24.09 KG/M2 | OXYGEN SATURATION: 99 % | SYSTOLIC BLOOD PRESSURE: 110 MMHG | HEART RATE: 64 BPM | WEIGHT: 153.5 LBS | DIASTOLIC BLOOD PRESSURE: 68 MMHG | TEMPERATURE: 97.6 F | HEIGHT: 67 IN

## 2024-10-08 DIAGNOSIS — E53.8 VITAMIN B12 DEFICIENCY: ICD-10-CM

## 2024-10-08 DIAGNOSIS — Z48.815 ENCOUNTER FOR SURGICAL AFTERCARE FOLLOWING SURGERY OF DIGESTIVE SYSTEM: Primary | ICD-10-CM

## 2024-10-08 DIAGNOSIS — Z98.84 BARIATRIC SURGERY STATUS: ICD-10-CM

## 2024-10-08 DIAGNOSIS — K91.2 POSTSURGICAL MALABSORPTION: ICD-10-CM

## 2024-10-08 PROCEDURE — 99214 OFFICE O/P EST MOD 30 MIN: CPT | Performed by: NURSE PRACTITIONER

## 2024-10-08 RX ORDER — CYANOCOBALAMIN (VITAMIN B-12) 500 MCG
TABLET ORAL
COMMUNITY

## 2024-10-08 NOTE — PROGRESS NOTES
Date of surgery: 1/17/2023  Procedure: Sleeve   Performing surgeon: Dr. Arroyo     Initial Weight - 231.0 lb   Current Weight - 153.5 lb   Justus Weight - 156.0 lb   Total Body Weight Loss (EWL)- 77.6  EWL% - 105%  TWB % - 34%

## 2024-10-08 NOTE — PROGRESS NOTES
Assessment/Plan:     Patient ID: Diana Moody is a 33 y.o. female.     Bariatric Surgery Status/BMI 24/B12 deficiency    -s/p Vertical Sleeve Gastrectomy with Dr. Arroyo on 01/17/2023. Presents to the office today for 18 month post op visit. Overall doing well, tolerating a regular diet. Denies having any abdominal pain, N/V/D/C, regurgitation, reflux or dysphagia. Taking her multivitamins daily. B12 level was low with recent labs however she has added an additional supplement for this.       PLAN:     - repeat B12 levels.; continue with b12 supplement. Repeat level to check if normalize  - Routine follow up in 6 months for annual visit  - Continue with healthy lifestyle, adequate protein intake of 60 gm, fluid intake of at least 64 oz.   - Continue with MVI daily.   - Activity as tolerated.   - Labs ordered and will adjust accordingly if any deficiency.   - Follow up with RD and SW as needed.     Continued/Maintain healthy weight loss with good nutrition intakes.  Adequate hydration with at least 64oz. fluid intake.  Follow diet as discussed.  Follow vitamin and mineral recommendations as reviewed with you.  Exercise as tolerated.    Colonoscopy referral made: n/a  EGD screening - due in 20296    Follow-up in 6 months for annual visit. We kindly ask that your arrive 15 minutes before your scheduled appointment time with your provider to allow our staff to room you, get your vital signs and update your chart.    Get lab work done prior to annual visit. Please call the office if you need a script.  It is recommended to check with your insurance BEFORE getting labs done to make sure they are covered by your policy.      Call our office if you have any problems with abdominal pain especially associated with fever, chills, nausea, vomiting or any other concerns.    All  Post-bariatric surgery patients should be aware that very small quantities of any alcohol can cause impairment and it is very possible not to feel  the effect. The effect can be in the system for several hours.  It is also a stomach irritant.     It is advised to AVOID alcohol, Nonsteroidal antiinflammatory drugs (NSAIDS) and nicotine of all forms . Any of these can cause stomach irritation/pain.    Discussed the effects of alcohol on a bariatric patient and the increased impairment risk.     Keep up the good work!     Postsurgical Malabsorption   -At risk for malabsorption of vitamins/minerals secondary to malabsorption and restriction of intake from bariatric surgery  -Currently taking adequate postop bariatric surgery vitamin supplementation  -Last set of bariatric labs completed on 06/10/2024 and showed low vitamin B12  -Next set of bariatric labs ordered for approximately 3 months  -Patient received education about the importance of adhering to a lifelong supplementation regimen to avoid vitamin/mineral deficiencies      Diagnoses and all orders for this visit:    Encounter for surgical aftercare following surgery of digestive system    Bariatric surgery status  -     Vitamin B12; Future  -     Methylmalonic acid, serum; Future    Postsurgical malabsorption    BMI 24.0-24.9, adult    Vitamin B12 deficiency  -     Vitamin B12; Future  -     Methylmalonic acid, serum; Future    Other orders  -     Cyanocobalamin (Vitamin B 12) 500 MCG TABS  -     Ascorbic Acid (VITAMIN C ADULT GUMMIES PO)         Subjective:      Patient ID: Diana Moody is a 33 y.o. female.    -s/p Vertical Sleeve Gastrectomy with Dr. Arroyo on 01/17/2023. Presents to the office today for 18 month post op visit. Overall doing well, tolerating a regular diet. Denies having any abdominal pain, N/V/D/C, regurgitation, reflux or dysphagia. Taking her multivitamins daily. B12 level was low with recent labs however she has added an additional supplement for this.     Initial: 250 lbs  Current: 153.5 lbs   EWL: (Weight loss is ahead of schedule at this post surgical period.)  Justus: current  "  Goal - 140 lbs.  Current BMI is Body mass index is 24.4 kg/m².    Tolerating a regular diet-yes  Eating at least 60 grams of protein per day-yes  Following 30/60 minute rule with liquids-yes  Drinking at least 64 ounces of fluid per day-yes  Drinking carbonated beverages-no  Sufficient exercise-yes - walking with her dogs and stairs at work   Using NSAIDs regularly-no  Using nicotine-no  Using alcohol-occasionally/RARELY   Supplements:   Multivitamins and Calcium (calcium intake via foods) + Vitamin B12  1000 mcg once daily     EWL is 105%, which places the patient ahead of schedule for expected post surgical weight loss at this time.     The following portions of the patient's history were reviewed and updated as appropriate: allergies, current medications, past family history, past medical history, past social history, past surgical history and problem list.    Review of Systems   Constitutional: Negative.    Respiratory: Negative.     Cardiovascular: Negative.    Gastrointestinal: Negative.    Musculoskeletal: Negative.    Neurological: Negative.    Psychiatric/Behavioral: Negative.           Objective:    /68 (BP Location: Left arm, Patient Position: Sitting, Cuff Size: Adult)   Pulse 64   Temp 97.6 °F (36.4 °C) (Tympanic)   Ht 5' 6.5\" (1.689 m)   Wt 69.6 kg (153 lb 8 oz)   SpO2 99%   BMI 24.40 kg/m²      Physical Exam  Vitals and nursing note reviewed.   Constitutional:       Appearance: Normal appearance. She is normal weight.   Cardiovascular:      Rate and Rhythm: Normal rate and regular rhythm.      Pulses: Normal pulses.      Heart sounds: Normal heart sounds.   Pulmonary:      Effort: Pulmonary effort is normal.      Breath sounds: Normal breath sounds.   Abdominal:      General: Bowel sounds are normal.      Palpations: Abdomen is soft.      Tenderness: There is no abdominal tenderness.   Musculoskeletal:         General: Normal range of motion.   Skin:     General: Skin is warm and dry. "   Neurological:      General: No focal deficit present.      Mental Status: She is alert and oriented to person, place, and time.   Psychiatric:         Mood and Affect: Mood normal.         Behavior: Behavior normal.         Thought Content: Thought content normal.         Judgment: Judgment normal.

## 2025-01-02 DIAGNOSIS — G44.209 TENSION HEADACHE: ICD-10-CM

## 2025-01-02 DIAGNOSIS — B00.1 HERPES LABIALIS: ICD-10-CM

## 2025-01-02 DIAGNOSIS — G47.09 OTHER INSOMNIA: ICD-10-CM

## 2025-01-02 DIAGNOSIS — F41.9 ANXIETY: ICD-10-CM

## 2025-01-02 DIAGNOSIS — F33.1 MODERATE EPISODE OF RECURRENT MAJOR DEPRESSIVE DISORDER (HCC): ICD-10-CM

## 2025-01-03 RX ORDER — METHOCARBAMOL 500 MG/1
500 TABLET, FILM COATED ORAL
Qty: 30 TABLET | Refills: 0 | Status: SHIPPED | OUTPATIENT
Start: 2025-01-03

## 2025-01-03 RX ORDER — ALPRAZOLAM 0.5 MG
0.5 TABLET ORAL 2 TIMES DAILY PRN
Qty: 30 TABLET | Refills: 0 | Status: SHIPPED | OUTPATIENT
Start: 2025-01-03

## 2025-01-03 RX ORDER — BUSPIRONE HYDROCHLORIDE 7.5 MG/1
7.5 TABLET ORAL 2 TIMES DAILY
Qty: 180 TABLET | Refills: 0 | Status: SHIPPED | OUTPATIENT
Start: 2025-01-03

## 2025-01-03 RX ORDER — VALACYCLOVIR HYDROCHLORIDE 500 MG/1
500 TABLET, FILM COATED ORAL DAILY
Qty: 90 TABLET | Refills: 0 | Status: SHIPPED | OUTPATIENT
Start: 2025-01-03 | End: 2025-07-02

## 2025-01-03 RX ORDER — ZOLPIDEM TARTRATE 5 MG/1
5 TABLET ORAL AS NEEDED
Qty: 30 TABLET | Refills: 0 | Status: SHIPPED | OUTPATIENT
Start: 2025-01-03

## 2025-01-03 RX ORDER — SERTRALINE HYDROCHLORIDE 100 MG/1
200 TABLET, FILM COATED ORAL DAILY
Qty: 180 TABLET | Refills: 0 | Status: SHIPPED | OUTPATIENT
Start: 2025-01-03

## 2025-01-03 RX ORDER — BUPROPION HYDROCHLORIDE 75 MG/1
75 TABLET ORAL 2 TIMES DAILY
Qty: 180 TABLET | Refills: 0 | Status: SHIPPED | OUTPATIENT
Start: 2025-01-03

## 2025-01-03 NOTE — TELEPHONE ENCOUNTER
NOV - does not have an appointment scheduled at this time, sent a my chart message to schedule her 6 month follow up around 1/17/25      PDMP checked

## 2025-03-05 ENCOUNTER — DOCUMENTATION (OUTPATIENT)
Dept: LABOR AND DELIVERY | Facility: HOSPITAL | Age: 35
End: 2025-03-05

## 2025-03-05 DIAGNOSIS — N30.90 CYSTITIS: Primary | ICD-10-CM

## 2025-03-05 RX ORDER — NITROFURANTOIN 25; 75 MG/1; MG/1
100 CAPSULE ORAL 2 TIMES DAILY
Qty: 14 CAPSULE | Refills: 0 | Status: SHIPPED | OUTPATIENT
Start: 2025-03-05 | End: 2025-03-12

## 2025-03-14 ENCOUNTER — TELEPHONE (OUTPATIENT)
Dept: INTERNAL MEDICINE CLINIC | Facility: OTHER | Age: 35
End: 2025-03-14

## 2025-04-08 ENCOUNTER — OFFICE VISIT (OUTPATIENT)
Dept: BARIATRICS | Facility: CLINIC | Age: 35
End: 2025-04-08
Payer: COMMERCIAL

## 2025-04-08 VITALS
DIASTOLIC BLOOD PRESSURE: 70 MMHG | SYSTOLIC BLOOD PRESSURE: 104 MMHG | WEIGHT: 154.5 LBS | HEIGHT: 68 IN | TEMPERATURE: 97.2 F | HEART RATE: 67 BPM | BODY MASS INDEX: 23.42 KG/M2

## 2025-04-08 DIAGNOSIS — Z48.815 ENCOUNTER FOR SURGICAL AFTERCARE FOLLOWING SURGERY OF DIGESTIVE SYSTEM: Primary | ICD-10-CM

## 2025-04-08 DIAGNOSIS — Z98.84 BARIATRIC SURGERY STATUS: ICD-10-CM

## 2025-04-08 DIAGNOSIS — K91.2 POSTSURGICAL MALABSORPTION: ICD-10-CM

## 2025-04-08 PROCEDURE — 99214 OFFICE O/P EST MOD 30 MIN: CPT | Performed by: NURSE PRACTITIONER

## 2025-04-08 NOTE — PROGRESS NOTES
Date of surgery:1/17/2023  Procedure:Sleeve  Performing surgeon: Dr. Arroyo    Initial Weight - 231 lb  Current Weight -154.5 lb  Justus Weight - now  Total Body Weight Loss (EWL)- 76.5%  EWL% - 104%  TWB % -33%

## 2025-04-08 NOTE — PROGRESS NOTES
Assessment/Plan:     Patient ID: Diana Moody is a 34 y.o. female.     Bariatric Surgery Status/BMI 23    -s/p Vertical Sleeve Gastrectomy with Dr. Arroyo on 01/17/2023. Presents to the office today for 2nd annual visit. She has been doing well overall. Tolerating a regular diet. Denies having any surgical concerns.     PLAN:     - Routine follow up in 1 year for annual visit.  - Continue with healthy lifestyle, adequate protein intake of 60 gm, fluid intake of at least 64 oz.   - Continue with MVI daily.   - Activity as tolerated.   - Labs ordered and will adjust accordingly if any deficiency.   - Follow up with RD and SW as needed.         Continued/Maintain healthy weight loss with good nutrition intakes.  Adequate hydration with at least 64oz. fluid intake.  Follow diet as discussed.  Follow vitamin and mineral recommendations as reviewed with you.  Exercise as tolerated.    Colonoscopy referral made: n/a  EGD screening - due in 2026    Follow-up in 1 year for annual visit. We kindly ask that your arrive 15 minutes before your scheduled appointment time with your provider to allow our staff to room you, get your vital signs and update your chart.    Get lab work done prior to annual visit. Please call the office if you need a script.  It is recommended to check with your insurance BEFORE getting labs done to make sure they are covered by your policy.      Call our office if you have any problems with abdominal pain especially associated with fever, chills, nausea, vomiting or any other concerns.    All  Post-bariatric surgery patients should be aware that very small quantities of any alcohol can cause impairment and it is very possible not to feel the effect. The effect can be in the system for several hours.  It is also a stomach irritant.     It is advised to AVOID alcohol, Nonsteroidal antiinflammatory drugs (NSAIDS) and nicotine of all forms . Any of these can cause stomach irritation/pain.    Discussed  the effects of alcohol on a bariatric patient and the increased impairment risk.     Keep up the good work!     Postsurgical Malabsorption   -At risk for malabsorption of vitamins/minerals secondary to malabsorption and restriction of intake from bariatric surgery  -Currently taking adequate postop bariatric surgery vitamin supplementation  -Last set of bariatric labs completed on 06/2024 and showed low vitamin B12   -Next set of bariatric labs ordered for approximately 2 weeks  -Patient received education about the importance of adhering to a lifelong supplementation regimen to avoid vitamin/mineral deficiencies      Diagnoses and all orders for this visit:    Encounter for surgical aftercare following surgery of digestive system  -     CBC; Future  -     Comprehensive metabolic panel; Future  -     Folate; Future  -     Iron Panel (Includes Ferritin, Iron Sat%, Iron, and TIBC); Future  -     PTH, intact; Future  -     Vitamin A; Future  -     Vitamin B1, whole blood; Future  -     Vitamin B12; Future  -     Vitamin D 25 hydroxy; Future  -     Zinc; Future  -     Methylmalonic acid, serum; Future  -     Hemoglobin A1C; Future  -     Lipid panel; Future    Bariatric surgery status  -     CBC; Future  -     Comprehensive metabolic panel; Future  -     Folate; Future  -     Iron Panel (Includes Ferritin, Iron Sat%, Iron, and TIBC); Future  -     PTH, intact; Future  -     Vitamin A; Future  -     Vitamin B1, whole blood; Future  -     Vitamin B12; Future  -     Vitamin D 25 hydroxy; Future  -     Zinc; Future  -     Methylmalonic acid, serum; Future  -     Hemoglobin A1C; Future  -     Lipid panel; Future    Postsurgical malabsorption  -     CBC; Future  -     Comprehensive metabolic panel; Future  -     Folate; Future  -     Iron Panel (Includes Ferritin, Iron Sat%, Iron, and TIBC); Future  -     PTH, intact; Future  -     Vitamin A; Future  -     Vitamin B1, whole blood; Future  -     Vitamin B12; Future  -      Vitamin D 25 hydroxy; Future  -     Zinc; Future  -     Methylmalonic acid, serum; Future  -     Hemoglobin A1C; Future  -     Lipid panel; Future    BMI 23.0-23.9, adult  -     CBC; Future  -     Comprehensive metabolic panel; Future  -     Folate; Future  -     Iron Panel (Includes Ferritin, Iron Sat%, Iron, and TIBC); Future  -     PTH, intact; Future  -     Vitamin A; Future  -     Vitamin B1, whole blood; Future  -     Vitamin B12; Future  -     Vitamin D 25 hydroxy; Future  -     Zinc; Future  -     Methylmalonic acid, serum; Future  -     Hemoglobin A1C; Future  -     Lipid panel; Future         Subjective:      Patient ID: Diana Moody is a 34 y.o. female.    -s/p Vertical Sleeve Gastrectomy with Dr. Arroyo on 01/17/2023. Presents to the office today for 2nd annual visit. She has been doing well overall. Tolerating a regular diet. Denies having any surgical concerns.     Initial: 250 LBS   Current: 154.5 LBS  EWL: (Weight loss is ahead of schedule at this post surgical period.)  Justus: 153.5 lbs   Current BMI is Body mass index is 23.49 kg/m².    Tolerating a regular diet-yes  Eating at least 60 grams of protein per day-yes  Following 30/60 minute rule with liquids-yes  Drinking at least 64 ounces of fluid per day-yes  Drinking carbonated beverages-no  Sufficient exercise-yes - walking   Using NSAIDs regularly-no  Using nicotine-no  Using alcohol-no  Supplements:  Multivitamins and Calcium (calcium intake via foods) + Vitamin B12  1000 mcg once daily     EWL is 104%, which places the patient ahead of schedule for expected post surgical weight loss at this time.     The following portions of the patient's history were reviewed and updated as appropriate: allergies, current medications, past family history, past medical history, past social history, past surgical history and problem list.    Review of Systems   Constitutional: Negative.    Respiratory: Negative.     Cardiovascular: Negative.   "  Gastrointestinal: Negative.    Musculoskeletal: Negative.    Neurological: Negative.    Psychiatric/Behavioral: Negative.           Objective:    /70 (Patient Position: Sitting, Cuff Size: Large)   Pulse 67   Temp (!) 97.2 °F (36.2 °C) (Tympanic)   Ht 5' 8\" (1.727 m)   Wt 70.1 kg (154 lb 8 oz)   BMI 23.49 kg/m²      Physical Exam  Vitals and nursing note reviewed.   Constitutional:       Appearance: Normal appearance.   Cardiovascular:      Rate and Rhythm: Normal rate and regular rhythm.      Pulses: Normal pulses.      Heart sounds: Normal heart sounds.   Pulmonary:      Effort: Pulmonary effort is normal.      Breath sounds: Normal breath sounds.   Abdominal:      General: Bowel sounds are normal.      Palpations: Abdomen is soft.      Tenderness: There is no abdominal tenderness.   Musculoskeletal:         General: Normal range of motion.   Skin:     General: Skin is warm and dry.   Neurological:      General: No focal deficit present.      Mental Status: She is alert and oriented to person, place, and time.   Psychiatric:         Mood and Affect: Mood normal.         Behavior: Behavior normal.         Thought Content: Thought content normal.         Judgment: Judgment normal.             "

## 2025-05-02 DIAGNOSIS — N76.0 ACUTE VAGINITIS: Primary | ICD-10-CM

## 2025-05-02 RX ORDER — METRONIDAZOLE 500 MG/1
500 TABLET ORAL EVERY 12 HOURS SCHEDULED
Qty: 28 TABLET | Refills: 0 | Status: SHIPPED | OUTPATIENT
Start: 2025-05-02 | End: 2025-05-16

## 2025-05-29 DIAGNOSIS — B00.1 HERPES LABIALIS: ICD-10-CM

## 2025-05-30 DIAGNOSIS — B00.1 HERPES LABIALIS: ICD-10-CM

## 2025-05-30 RX ORDER — VALACYCLOVIR HYDROCHLORIDE 500 MG/1
500 TABLET, FILM COATED ORAL DAILY
Qty: 90 TABLET | Refills: 0 | OUTPATIENT
Start: 2025-05-30

## 2025-06-02 RX ORDER — VALACYCLOVIR HYDROCHLORIDE 500 MG/1
500 TABLET, FILM COATED ORAL DAILY
Qty: 90 TABLET | Refills: 0 | Status: SHIPPED | OUTPATIENT
Start: 2025-06-02 | End: 2025-11-29

## 2025-07-11 DIAGNOSIS — F33.1 MODERATE EPISODE OF RECURRENT MAJOR DEPRESSIVE DISORDER (HCC): ICD-10-CM

## 2025-07-11 DIAGNOSIS — B00.1 HERPES LABIALIS: ICD-10-CM

## 2025-07-11 DIAGNOSIS — F41.9 ANXIETY: ICD-10-CM

## 2025-07-11 RX ORDER — VALACYCLOVIR HYDROCHLORIDE 500 MG/1
500 TABLET, FILM COATED ORAL DAILY
Qty: 90 TABLET | Refills: 0 | Status: SHIPPED | OUTPATIENT
Start: 2025-07-11 | End: 2025-07-16 | Stop reason: SDUPTHER

## 2025-07-11 RX ORDER — SERTRALINE HYDROCHLORIDE 100 MG/1
200 TABLET, FILM COATED ORAL DAILY
Qty: 180 TABLET | Refills: 0 | Status: SHIPPED | OUTPATIENT
Start: 2025-07-11 | End: 2025-07-16 | Stop reason: SDUPTHER

## 2025-07-11 RX ORDER — BUSPIRONE HYDROCHLORIDE 7.5 MG/1
7.5 TABLET ORAL 2 TIMES DAILY
Qty: 180 TABLET | Refills: 0 | Status: SHIPPED | OUTPATIENT
Start: 2025-07-11

## 2025-07-11 RX ORDER — BUPROPION HYDROCHLORIDE 75 MG/1
75 TABLET ORAL 2 TIMES DAILY
Qty: 180 TABLET | Refills: 0 | Status: SHIPPED | OUTPATIENT
Start: 2025-07-11 | End: 2025-07-16 | Stop reason: SDUPTHER

## 2025-07-16 ENCOUNTER — OFFICE VISIT (OUTPATIENT)
Dept: INTERNAL MEDICINE CLINIC | Facility: OTHER | Age: 35
End: 2025-07-16
Payer: COMMERCIAL

## 2025-07-16 VITALS
DIASTOLIC BLOOD PRESSURE: 72 MMHG | RESPIRATION RATE: 20 BRPM | SYSTOLIC BLOOD PRESSURE: 110 MMHG | HEIGHT: 68 IN | TEMPERATURE: 98.6 F | BODY MASS INDEX: 23.49 KG/M2 | HEART RATE: 82 BPM | OXYGEN SATURATION: 97 %

## 2025-07-16 DIAGNOSIS — G47.09 OTHER INSOMNIA: ICD-10-CM

## 2025-07-16 DIAGNOSIS — F41.9 ANXIETY: ICD-10-CM

## 2025-07-16 DIAGNOSIS — Z00.00 ANNUAL PHYSICAL EXAM: Primary | ICD-10-CM

## 2025-07-16 DIAGNOSIS — B00.1 HERPES LABIALIS: ICD-10-CM

## 2025-07-16 DIAGNOSIS — J45.20 MILD INTERMITTENT ASTHMA WITHOUT COMPLICATION: ICD-10-CM

## 2025-07-16 DIAGNOSIS — F33.1 MODERATE EPISODE OF RECURRENT MAJOR DEPRESSIVE DISORDER (HCC): ICD-10-CM

## 2025-07-16 DIAGNOSIS — G44.209 TENSION HEADACHE: ICD-10-CM

## 2025-07-16 PROCEDURE — 99395 PREV VISIT EST AGE 18-39: CPT | Performed by: NURSE PRACTITIONER

## 2025-07-16 PROCEDURE — 99214 OFFICE O/P EST MOD 30 MIN: CPT | Performed by: NURSE PRACTITIONER

## 2025-07-16 RX ORDER — ALPRAZOLAM 0.5 MG
0.5 TABLET ORAL 2 TIMES DAILY PRN
Qty: 30 TABLET | Refills: 0 | Status: SHIPPED | OUTPATIENT
Start: 2025-07-16

## 2025-07-16 RX ORDER — SERTRALINE HYDROCHLORIDE 100 MG/1
200 TABLET, FILM COATED ORAL DAILY
Qty: 180 TABLET | Refills: 1 | Status: SHIPPED | OUTPATIENT
Start: 2025-07-16

## 2025-07-16 RX ORDER — VALACYCLOVIR HYDROCHLORIDE 500 MG/1
500 TABLET, FILM COATED ORAL DAILY
Qty: 90 TABLET | Refills: 1 | Status: SHIPPED | OUTPATIENT
Start: 2025-07-16 | End: 2026-01-12

## 2025-07-16 RX ORDER — ALBUTEROL SULFATE 90 UG/1
2 INHALANT RESPIRATORY (INHALATION) EVERY 6 HOURS PRN
Qty: 18 G | Refills: 1 | Status: SHIPPED | OUTPATIENT
Start: 2025-07-16

## 2025-07-16 RX ORDER — METHOCARBAMOL 500 MG/1
500 TABLET, FILM COATED ORAL
Qty: 30 TABLET | Refills: 0 | Status: SHIPPED | OUTPATIENT
Start: 2025-07-16

## 2025-07-16 RX ORDER — ZOLPIDEM TARTRATE 5 MG/1
5 TABLET ORAL AS NEEDED
Qty: 30 TABLET | Refills: 0 | Status: SHIPPED | OUTPATIENT
Start: 2025-07-16

## 2025-07-16 RX ORDER — BUPROPION HYDROCHLORIDE 75 MG/1
75 TABLET ORAL 2 TIMES DAILY
Qty: 180 TABLET | Refills: 1 | Status: SHIPPED | OUTPATIENT
Start: 2025-07-16

## 2025-07-16 NOTE — ASSESSMENT & PLAN NOTE
Mood controlled on current regimen  Continue Zoloft 200 mg daily, BuSpar 7.5 mg twice daily and Xanax as needed  Orders:    ALPRAZolam (XANAX) 0.5 mg tablet; Take 1 tablet (0.5 mg total) by mouth 2 (two) times a day as needed for anxiety    sertraline (ZOLOFT) 100 mg tablet; Take 2 tablets (200 mg total) by mouth daily

## 2025-07-16 NOTE — ASSESSMENT & PLAN NOTE
Depression Screening Follow-up Plan: Patient's depression screening was positive with a PHQ-9 score of 6. Patient with underlying depression and was advised to continue current medications as prescribed.    Continue current regimen of Zoloft 200 mg daily, Wellbutrin 75 mg twice daily and Xanax as needed  No SI or HI  Orders:    buPROPion (WELLBUTRIN) 75 mg tablet; Take 1 tablet (75 mg total) by mouth 2 (two) times a day

## 2025-07-16 NOTE — PATIENT INSTRUCTIONS
"Patient Education     Routine physical for adults   The Basics   Written by the doctors and editors at [facility]   What is a physical? -- A physical is a routine visit, or \"check-up,\" with your doctor. You might also hear it called a \"wellness visit\" or \"preventive visit.\"  During each visit, the doctor will:   Ask about your physical and mental health   Ask about your habits, behaviors, and lifestyle   Do an exam   Give you vaccines if needed   Talk to you about any medicines you take   Give advice about your health   Answer your questions  Getting regular check-ups is an important part of taking care of your health. It can help your doctor find and treat any problems you have. But it's also important for preventing health problems.  A routine physical is different from a \"sick visit.\" A sick visit is when you see a doctor because of a health concern or problem. Since physicals are scheduled ahead of time, you can think about what you want to ask the doctor.  How often should I get a physical? -- It depends on your age and health. In general, for people age 21 years and older:   If you are younger than 50 years, you might be able to get a physical every 3 years.   If you are 50 years or older, your doctor might recommend a physical every year.  If you have an ongoing health condition, like diabetes or high blood pressure, your doctor will probably want to see you more often.  What happens during a physical? -- In general, each visit will include:   Physical exam - The doctor or nurse will check your height, weight, heart rate, and blood pressure. They will also look at your eyes and ears. They will ask about how you are feeling and whether you have any symptoms that bother you.   Medicines - It's a good idea to bring a list of all the medicines you take to each doctor visit. Your doctor will talk to you about your medicines and answer any questions. Tell them if you are having any side effects that bother you. You " "should also tell them if you are having trouble paying for any of your medicines.   Habits and behaviors - This includes:   Your diet   Your exercise habits   Whether you smoke, drink alcohol, or use drugs   Whether you are sexually active   Whether you feel safe at home  Your doctor will talk to you about things you can do to improve your health and lower your risk of health problems. They will also offer help and support. For example, if you want to quit smoking, they can give you advice and might prescribe medicines. If you want to improve your diet or get more physical activity, they can help you with this, too.   Lab tests, if needed - The tests you get will depend on your age and situation. For example, your doctor might want to check your:   Cholesterol   Blood sugar   Iron level   Vaccines - The recommended vaccines will depend on your age, health, and what vaccines you already had. Vaccines are very important because they can prevent certain serious or deadly infections.   Discussion of screening - \"Screening\" means checking for diseases or other health problems before they cause symptoms. Your doctor can recommend screening based on your age, risk, and preferences. This might include tests to check for:   Cancer, such as breast, prostate, cervical, ovarian, colorectal, prostate, lung, or skin cancer   Sexually transmitted infections, such as chlamydia and gonorrhea   Mental health conditions like depression and anxiety  Your doctor will talk to you about the different types of screening tests. They can help you decide which screenings to have. They can also explain what the results might mean.   Answering questions - The physical is a good time to ask the doctor or nurse questions about your health. If needed, they can refer you to other doctors or specialists, too.  Adults older than 65 years often need other care, too. As you get older, your doctor will talk to you about:   How to prevent falling at " home   Hearing or vision tests   Memory testing   How to take your medicines safely   Making sure that you have the help and support you need at home  All topics are updated as new evidence becomes available and our peer review process is complete.  This topic retrieved from [employer] on: May 02, 2024.  Topic 623247 Version 1.0  Release: 32.4.3 - C32.122  © 2024 UpToDate, Inc[employer] and/or its affiliates. All rights reserved.  Consumer Information Use and Disclaimer   Disclaimer: This generalized information is a limited summary of diagnosis, treatment, and/or medication information. It is not meant to be comprehensive and should be used as a tool to help the user understand and/or assess potential diagnostic and treatment options. It does NOT include all information about conditions, treatments, medications, side effects, or risks that may apply to a specific patient. It is not intended to be medical advice or a substitute for the medical advice, diagnosis, or treatment of a health care provider based on the health care provider's examination and assessment of a patient's specific and unique circumstances. Patients must speak with a health care provider for complete information about their health, medical questions, and treatment options, including any risks or benefits regarding use of medications. This information does not endorse any treatments or medications as safe, effective, or approved for treating a specific patient. Up[employer]Date, Inc. and its affiliates disclaim any warranty or liability relating to this information or the use thereof.The use of this information is governed by the Terms of Use, available at https://www.woltersWevebobuwer.com/en/know/clinical-effectiveness-terms. 2024© UpToDate, Inc. and its affiliates and/or licensors. All rights reserved.  Copyright   © 2024 Up[employer], [employer]. and/or its affiliates. All rights reserved.

## 2025-07-16 NOTE — ASSESSMENT & PLAN NOTE
Continue Valtrex 500 mg daily for suppressive care  Update CMP  Orders:    valACYclovir (VALTREX) 500 mg tablet; Take 1 tablet (500 mg total) by mouth daily

## 2025-07-16 NOTE — ASSESSMENT & PLAN NOTE
Uses Ambien sparingly  Orders:    zolpidem (AMBIEN) 5 mg tablet; Take 1 tablet (5 mg total) by mouth as needed for sleep

## 2025-07-16 NOTE — ASSESSMENT & PLAN NOTE
Controlled on albuterol as needed, uses primarily for exercise  Orders:    albuterol (ProAir HFA) 90 mcg/act inhaler; Inhale 2 puffs every 6 (six) hours as needed for wheezing

## 2025-07-16 NOTE — PROGRESS NOTES
Adult Annual Physical  Name: Diana Moody      : 1990      MRN: [id number]  Encounter Provider: ELAINE Franco  Encounter Date: 2025   Encounter department: [facility] [place]    :  Assessment & Plan  Herpes labialis  Continue Valtrex 500 mg daily for suppressive care  Update CMP  Orders:    valACYclovir (VALTREX) 500 mg tablet; Take 1 tablet (500 mg total) by mouth daily    Anxiety  Mood controlled on current regimen  Continue Zoloft 200 mg daily, BuSpar 7.5 mg twice daily and Xanax as needed  Orders:    ALPRAZolam (XANAX) 0.5 mg tablet; Take 1 tablet (0.5 mg total) by mouth 2 (two) times a day as needed for anxiety    sertraline (ZOLOFT) 100 mg tablet; Take 2 tablets (200 mg total) by mouth daily    Mild intermittent asthma without complication  Controlled on albuterol as needed, uses primarily for exercise  Orders:    albuterol (ProAir HFA) 90 mcg/act inhaler; Inhale 2 puffs every 6 (six) hours as needed for wheezing    Moderate episode of recurrent major depressive disorder (HCC)  Depression Screening Follow-up Plan: Patient's depression screening was positive with a PHQ-9 score of 6. Patient with underlying depression and was advised to continue current medications as prescribed.    Continue current regimen of Zoloft 200 mg daily, Wellbutrin 75 mg twice daily and Xanax as needed  No SI or HI  Orders:    buPROPion (WELLBUTRIN) 75 mg tablet; Take 1 tablet (75 mg total) by mouth 2 (two) times a day    Other insomnia  Uses Ambien sparingly  Orders:    zolpidem (AMBIEN) 5 mg tablet; Take 1 tablet (5 mg total) by mouth as needed for sleep    Tension headache    Orders:    methocarbamol (ROBAXIN) 500 mg tablet; Take 1 tablet (500 mg total) by mouth daily at bedtime as needed for muscle spasms    Annual physical exam  Healthy 34-year-old female  Up-to-date on preventative immunizations  Will go for labs, ordered by bariatrics  Follow-up in 1 year, sooner as  needed         Preventive Screenings:    - Hepatitis C screening: screening up-to-date   - HIV screening: screening up-to-date   - Lung cancer screening: screening not indicated     Immunizations:  - Immunizations due: Prevnar 20         History of Present Illness     Adult Annual Physical:  Patient presents for annual physical. Patient presents today for annual physical and routine follow up.     No recent labs for review   She follows with bariatrics as she is s/p Vertical Sleeve Gastrectomy with Dr. Arroyo on 01/17/2023     Asthma - controlled with albuterol prn    Anxiety-  currently on sertraline 200mg daily, buspar 7.5mg bid and xanax prn.  Symptoms well-controlled    Depression - currently on sertraline 200mg daily, wellbutrin 75mg bid and xanax prn.  Symptoms well-controlled    Insomnia - uses ambien prn   .     Diet and Physical Activity:  - Diet/Nutrition: portion control, heart healthy (low sodium) diet and limited junk food.  - Exercise: walking.    Depression Screening:    - PHQ-9 Score: 7    General Health:  - Sleep: 4-6 hours of sleep on average.  - Hearing: normal hearing bilateral ears.  - Vision: most recent eye exam > 1 year ago and wears glasses and contacts.  - Dental: regular dental visits and brushes teeth twice daily.    /GYN Health:  - Follows with GYN: yes.   - Menopause: premenopausal.   - History of STDs: yes  - Contraception: IUD placement.      Advanced Care Planning:  - Has an advanced directive?: no    - Has a durable medical POA?: no      Review of Systems   Constitutional:  Negative for activity change, appetite change, chills, diaphoresis, fatigue, fever and unexpected weight change.   Eyes:  Negative for visual disturbance.   Respiratory:  Negative for cough, chest tightness, shortness of breath and wheezing.    Cardiovascular:  Negative for chest pain and palpitations.   Gastrointestinal:  Negative for abdominal distention, abdominal pain, blood in stool, constipation,  "diarrhea, nausea and vomiting.   Genitourinary:  Negative for difficulty urinating and dysuria.   Neurological:  Negative for dizziness, light-headedness and headaches.   Psychiatric/Behavioral:  Positive for dysphoric mood. The patient is nervous/anxious.      Medical History Reviewed by provider this encounter:     .  Past Medical History   Past Medical History[1]  Past Surgical History[2]  Family History[3]   reports that she has never smoked. She has never used smokeless tobacco. She reports current alcohol use of about 4.0 standard drinks of alcohol per week. She reports that she does not use drugs.  Current Outpatient Medications   Medication Instructions    albuterol (ProAir HFA) 90 mcg/act inhaler 2 puffs, Inhalation, Every 6 hours PRN    ALPRAZolam (XANAX) 0.5 mg, Oral, 2 times daily PRN    Ascorbic Acid (VITAMIN C ADULT GUMMIES PO)     buPROPion (WELLBUTRIN) 75 mg, Oral, 2 times daily    busPIRone (BUSPAR) 7.5 mg, Oral, 2 times daily    Cyanocobalamin (Vitamin B 12) 500 MCG TABS     Galcanezumab-gnlm 120 MG/ML SOAJ 1 subcu injection q30 days    methocarbamol (ROBAXIN) 500 mg, Oral, Daily at bedtime PRN    metoclopramide (REGLAN) 10 mg tablet 1 tab as needed at onset of migraine, can repeat in 8 hours, can combine with triptan/NSAID    Multiple Vitamin (multivitamin) tablet 1 tablet, Daily    sertraline (ZOLOFT) 200 mg, Oral, Daily    valACYclovir (VALTREX) 500 mg, Oral, Daily    ZOLMitriptan (ZOMIG) 5 MG tablet 1/2-1 tab at onset of migraine, can repeat in 2 hours, max 10mg/24 hours    zolpidem (AMBIEN) 5 mg, Oral, As needed   Allergies[4]   Medications Ordered Prior to Encounter[5]   Social History[6]    Objective   /72 (BP Location: Left arm, Patient Position: Sitting, Cuff Size: Standard)   Pulse 82   Temp 98.6 °F (37 °C) (Temporal)   Resp 20   Ht 5' 8\" (1.727 m)   SpO2 97%   BMI 23.49 kg/m²     Physical Exam  Vitals reviewed.   Constitutional:       General: She is not in acute distress.    "  Appearance: Normal appearance. She is well-developed. She is not diaphoretic.   HENT:      Head: Normocephalic and atraumatic.      Right Ear: Tympanic membrane and external ear normal.      Left Ear: Tympanic membrane and external ear normal.      Nose: Nose normal. No mucosal edema, congestion or rhinorrhea.      Mouth/Throat:      Mouth: Mucous membranes are moist.      Pharynx: Oropharynx is clear. No oropharyngeal exudate or posterior oropharyngeal erythema.     Eyes:      Extraocular Movements: Extraocular movements intact.      Conjunctiva/sclera: Conjunctivae normal.      Pupils: Pupils are equal, round, and reactive to light.     Neck:      Thyroid: No thyromegaly.     Cardiovascular:      Rate and Rhythm: Normal rate and regular rhythm.      Heart sounds: Normal heart sounds. No murmur heard.  Pulmonary:      Effort: Pulmonary effort is normal. No respiratory distress.      Breath sounds: Normal breath sounds. No decreased breath sounds, wheezing, rhonchi or rales.   Abdominal:      General: Abdomen is flat. Bowel sounds are normal. There is no distension.      Palpations: Abdomen is soft. There is no mass.      Tenderness: There is no abdominal tenderness. There is no guarding.     Musculoskeletal:         General: No tenderness. Normal range of motion.      Cervical back: Normal range of motion and neck supple. No edema.   Lymphadenopathy:      Cervical: No cervical adenopathy.      Upper Body:      Right upper body: No supraclavicular, axillary, pectoral or epitrochlear adenopathy.      Left upper body: No supraclavicular, axillary, pectoral or epitrochlear adenopathy.     Skin:     General: Skin is warm.      Findings: No rash.     Neurological:      Mental Status: She is alert and oriented to person, place, and time. Mental status is at baseline.      Motor: No weakness or abnormal muscle tone.      Gait: Gait normal.      Deep Tendon Reflexes: Reflexes are normal and symmetric.     Psychiatric:          Mood and Affect: Mood normal.         Behavior: Behavior normal.         Thought Content: Thought content normal.         Judgment: Judgment normal.              [1]   Past Medical History:  Diagnosis Date    Abnormal Pap smear of cervix 2016 2016 ASCUS, HPV HR+ (not 16/18), 2017 NILM    Allergic     Allergic rhinitis 2010    Anxiety     Asthma 2004    COVID-19 virus infection 11/18/2021    Depression     FHx: migraine headaches     Fracture of nasal bones 2015    Headache(784.0)     Headache, tension-type     High blood pressure     Hypertension 2008    Migraine 2004    Orbital fracture (HCC) 2015    Overdose of drug/medicinal substance, undetermined intent, initial encounter 08/31/2021    Accidental    Trigeminal neuralgia 2016   [2]   Past Surgical History:  Procedure Laterality Date    BARIATRIC SURGERY  1/2023    DILATION AND CURETTAGE OF UTERUS  2017    EGD      CT INSERTION INTRAUTERINE DEVICE IUD N/A 09/13/2023    Procedure: INSERTION OF INTRAUTERINE DEVICE (IUD), EUA;  Surgeon: Kailyn Salcido MD;  Location: [facility];  Service: Gynecology    CT LAPS GSTRC RSTRICTIV PX LONGITUDINAL GASTRECTOMY N/A 01/17/2023    Procedure: GASTRECTOMY  SLEEVE W/ ROBOT;  Surgeon: Fahad Arroyo MD;  Location: [facility];  Service: Bariatrics    WISDOM TOOTH EXTRACTION      no anesthesia   [3]   Family History  Problem Relation Name Age of Onset    Hypertension Mother Erika     Atrial fibrillation Mother Erika     Stroke Mother Erika     Depression Mother Erika     Anxiety disorder Mother Erika     Mental illness Mother Erika     Hypertension Father Ministerio     Stroke Maternal Grandmother Gricel     Hypertension Maternal Grandmother Gricel     Atrial fibrillation Maternal Grandmother Gricel     Stroke Maternal Grandfather Antony     Hypertension Maternal Grandfather Antony     Heart disease Maternal Grandfather Antony     Hypertension Paternal Grandfather Lakhwinder     Thyroid disease Neg Hx      Diabetes Neg Hx     [4]    Allergies  Allergen Reactions    Clarithromycin Hives    Tetracyclines & Related Hives   [5]   Current Outpatient Medications on File Prior to Visit   Medication Sig Dispense Refill    Ascorbic Acid (VITAMIN C ADULT GUMMIES PO)       busPIRone (BUSPAR) 7.5 mg tablet Take 1 tablet (7.5 mg total) by mouth 2 (two) times a day 180 tablet 0    Cyanocobalamin (Vitamin B 12) 500 MCG TABS       Multiple Vitamin (multivitamin) tablet Take 1 tablet by mouth in the morning. Bariatric multivitamin.      Galcanezumab-gnlm 120 MG/ML SOAJ 1 subcu injection q30 days (Patient not taking: Reported on 4/8/2025) 1 mL 11    metoclopramide (REGLAN) 10 mg tablet 1 tab as needed at onset of migraine, can repeat in 8 hours, can combine with triptan/NSAID (Patient not taking: Reported on 4/8/2025) 20 tablet 3    ZOLMitriptan (ZOMIG) 5 MG tablet 1/2-1 tab at onset of migraine, can repeat in 2 hours, max 10mg/24 hours (Patient not taking: Reported on 10/8/2024) 12 tablet 3     No current facility-administered medications on file prior to visit.   [6]   Social History  Tobacco Use    Smoking status: Never    Smokeless tobacco: Never   Vaping Use    Vaping status: Never Used   Substance and Sexual Activity    Alcohol use: Yes     Alcohol/week: 4.0 standard drinks of alcohol     Types: 2 Glasses of wine, 2 Cans of beer per week     Comment: Socially    Drug use: No    Sexual activity: Yes     Partners: Female, Male     Birth control/protection: I.U.D., Male Sterilization

## 2025-07-28 ENCOUNTER — APPOINTMENT (OUTPATIENT)
Dept: LAB | Facility: CLINIC | Age: 35
End: 2025-07-28
Attending: PREVENTIVE MEDICINE
Payer: COMMERCIAL

## 2025-07-28 ENCOUNTER — APPOINTMENT (OUTPATIENT)
Dept: LAB | Facility: CLINIC | Age: 35
End: 2025-07-28
Payer: COMMERCIAL

## 2025-07-28 DIAGNOSIS — Z48.815 ENCOUNTER FOR SURGICAL AFTERCARE FOLLOWING SURGERY OF DIGESTIVE SYSTEM: ICD-10-CM

## 2025-07-28 DIAGNOSIS — Z00.8 HEALTH EXAMINATION IN POPULATION SURVEY: ICD-10-CM

## 2025-07-28 DIAGNOSIS — K91.2 POSTSURGICAL MALABSORPTION: ICD-10-CM

## 2025-07-28 DIAGNOSIS — Z98.84 BARIATRIC SURGERY STATUS: ICD-10-CM

## 2025-07-28 LAB
25(OH)D3 SERPL-MCNC: 39.4 NG/ML (ref 30–100)
ALBUMIN SERPL BCG-MCNC: 4.3 G/DL (ref 3.5–5)
ALP SERPL-CCNC: 55 U/L (ref 34–104)
ALT SERPL W P-5'-P-CCNC: 12 U/L (ref 7–52)
ANION GAP SERPL CALCULATED.3IONS-SCNC: 5 MMOL/L (ref 4–13)
AST SERPL W P-5'-P-CCNC: 11 U/L (ref 13–39)
BILIRUB SERPL-MCNC: 0.49 MG/DL (ref 0.2–1)
BUN SERPL-MCNC: 18 MG/DL (ref 5–25)
CALCIUM SERPL-MCNC: 9.1 MG/DL (ref 8.4–10.2)
CHLORIDE SERPL-SCNC: 104 MMOL/L (ref 96–108)
CHOLEST SERPL-MCNC: 160 MG/DL (ref ?–200)
CO2 SERPL-SCNC: 31 MMOL/L (ref 21–32)
CREAT SERPL-MCNC: 0.78 MG/DL (ref 0.6–1.3)
ERYTHROCYTE [DISTWIDTH] IN BLOOD BY AUTOMATED COUNT: 11.9 % (ref 11.6–15.1)
EST. AVERAGE GLUCOSE BLD GHB EST-MCNC: 105 MG/DL
FERRITIN SERPL-MCNC: 33 NG/ML (ref 30–307)
FOLATE SERPL-MCNC: >22.3 NG/ML
GFR SERPL CREATININE-BSD FRML MDRD: 100 ML/MIN/1.73SQ M
GLUCOSE P FAST SERPL-MCNC: 88 MG/DL (ref 65–99)
HBA1C MFR BLD: 5.3 %
HCT VFR BLD AUTO: 38.1 % (ref 34.8–46.1)
HDLC SERPL-MCNC: 69 MG/DL
HGB BLD-MCNC: 13.1 G/DL (ref 11.5–15.4)
IRON SATN MFR SERPL: 26 % (ref 15–50)
IRON SERPL-MCNC: 90 UG/DL (ref 50–212)
LDLC SERPL CALC-MCNC: 81 MG/DL (ref 0–100)
MCH RBC QN AUTO: 32.2 PG (ref 26.8–34.3)
MCHC RBC AUTO-ENTMCNC: 34.4 G/DL (ref 31.4–37.4)
MCV RBC AUTO: 94 FL (ref 82–98)
NONHDLC SERPL-MCNC: 91 MG/DL
PLATELET # BLD AUTO: 191 THOUSANDS/UL (ref 149–390)
PMV BLD AUTO: 10.3 FL (ref 8.9–12.7)
POTASSIUM SERPL-SCNC: 3.8 MMOL/L (ref 3.5–5.3)
PROT SERPL-MCNC: 6.5 G/DL (ref 6.4–8.4)
PTH-INTACT SERPL-MCNC: 45.2 PG/ML (ref 12–88)
RBC # BLD AUTO: 4.07 MILLION/UL (ref 3.81–5.12)
SODIUM SERPL-SCNC: 140 MMOL/L (ref 135–147)
TIBC SERPL-MCNC: 343 UG/DL (ref 250–450)
TRANSFERRIN SERPL-MCNC: 245 MG/DL (ref 203–362)
TRIGL SERPL-MCNC: 48 MG/DL (ref ?–150)
UIBC SERPL-MCNC: 253 UG/DL (ref 155–355)
VIT B12 SERPL-MCNC: 905 PG/ML (ref 180–914)
WBC # BLD AUTO: 5.65 THOUSAND/UL (ref 4.31–10.16)

## 2025-07-28 PROCEDURE — 82746 ASSAY OF FOLIC ACID SERUM: CPT

## 2025-07-28 PROCEDURE — 84630 ASSAY OF ZINC: CPT

## 2025-07-28 PROCEDURE — 84590 ASSAY OF VITAMIN A: CPT

## 2025-07-28 PROCEDURE — 83970 ASSAY OF PARATHORMONE: CPT

## 2025-07-28 PROCEDURE — 83540 ASSAY OF IRON: CPT

## 2025-07-28 PROCEDURE — 84425 ASSAY OF VITAMIN B-1: CPT

## 2025-07-28 PROCEDURE — 80053 COMPREHEN METABOLIC PANEL: CPT

## 2025-07-28 PROCEDURE — 83550 IRON BINDING TEST: CPT

## 2025-07-28 PROCEDURE — 82728 ASSAY OF FERRITIN: CPT

## 2025-07-28 PROCEDURE — 83918 ORGANIC ACIDS TOTAL QUANT: CPT

## 2025-07-28 PROCEDURE — 85027 COMPLETE CBC AUTOMATED: CPT

## 2025-07-28 PROCEDURE — 82607 VITAMIN B-12: CPT

## 2025-07-28 PROCEDURE — 36415 COLL VENOUS BLD VENIPUNCTURE: CPT

## 2025-07-28 PROCEDURE — 80061 LIPID PANEL: CPT

## 2025-07-28 PROCEDURE — 83036 HEMOGLOBIN GLYCOSYLATED A1C: CPT

## 2025-07-28 PROCEDURE — 82306 VITAMIN D 25 HYDROXY: CPT

## 2025-07-31 LAB — ZINC SERPL-MCNC: 74 UG/DL (ref 44–115)

## 2025-08-01 LAB
METHYLMALONATE SERPL-SCNC: 130 NMOL/L (ref 0–378)
VIT A SERPL-MCNC: 61.7 UG/DL (ref 18.9–57.3)
VIT B1 BLD-SCNC: 139.9 NMOL/L (ref 66.5–200)

## 2025-08-13 ENCOUNTER — COSMETIC (OUTPATIENT)
Dept: PLASTIC SURGERY | Facility: CLINIC | Age: 35
End: 2025-08-13

## (undated) DEVICE — SEAL

## (undated) DEVICE — PLUMEPEN PRO 10FT

## (undated) DEVICE — BAG DECANTER

## (undated) DEVICE — KIT, ROBOTIC BARIATRIC: Brand: CARDINAL HEALTH

## (undated) DEVICE — WEBRIL 6 IN UNSTERILE

## (undated) DEVICE — TROCARS: Brand: KII® OPTICAL ACCESS SYSTEM

## (undated) DEVICE — ARM DRAPE

## (undated) DEVICE — CANNULA SEAL

## (undated) DEVICE — TOWEL SET X-RAY

## (undated) DEVICE — SUT MONOCRYL 4-0 PS-2 27 IN Y426H

## (undated) DEVICE — TROCAR: Brand: KII FIOS FIRST ENTRY

## (undated) DEVICE — PROBE COVER: Brand: STERILE PROBE COVER

## (undated) DEVICE — SCD SEQUENTIAL COMPRESSION COMFORT SLEEVE MEDIUM KNEE LENGTH: Brand: KENDALL SCD

## (undated) DEVICE — GLOVE INDICATOR PI UNDERGLOVE SZ 7 BLUE

## (undated) DEVICE — MEGA SUTURECUT ND: Brand: ENDOWRIST

## (undated) DEVICE — VESSEL SEALER EXTEND: Brand: ENDOWRIST

## (undated) DEVICE — STAPLER 60: Brand: SUREFORM

## (undated) DEVICE — GLOVE SRG LF STRL BGL SKNSNS 6.5 PF

## (undated) DEVICE — 2000CC GUARDIAN II: Brand: GUARDIAN

## (undated) DEVICE — PVC URETHRAL CATHETER: Brand: DOVER

## (undated) DEVICE — GLOVE SRG BIOGEL 8

## (undated) DEVICE — CADIERE FORCEPS: Brand: ENDOWRIST

## (undated) DEVICE — PERI/GYN PACK: Brand: CONVERTORS

## (undated) DEVICE — VIAL DECANTER

## (undated) DEVICE — ENDOPATH PNEUMONEEDLE INSUFFLATION NEEDLES WITH LUER LOCK CONNECTORS 150MM: Brand: ENDOPATH

## (undated) DEVICE — SYRINGE,TOOMEY,IRRIGATION,70CC,STERILE: Brand: MEDLINE

## (undated) DEVICE — STAPLER 60 RELOAD WHITE: Brand: SUREFORM

## (undated) DEVICE — STAPLER 60 RELOAD BLACK: Brand: SUREFORM

## (undated) DEVICE — STERILE SURGICAL LUBRICANT,  TUBE: Brand: SURGILUBE

## (undated) DEVICE — STAPLER 60 RELOAD BLUE: Brand: SUREFORM

## (undated) DEVICE — VISIGI 3D®  CALIBRATION SYSTEM  SIZE 36FR SLEEVE/STD: Brand: BOEHRINGER® VISIGI 3D™ SLEEVE GASTRECTOMY CALIBRATION SYSTEM, SIZE 36FR

## (undated) DEVICE — COLUMN DRAPE

## (undated) DEVICE — VIOLET BRAIDED (POLYGLACTIN 910), SYNTHETIC ABSORBABLE SUTURE: Brand: COATED VICRYL

## (undated) DEVICE — ADHESIVE SKIN CLSR DERMABOND NX

## (undated) DEVICE — 3000CC GUARDIAN II: Brand: GUARDIAN

## (undated) DEVICE — [HIGH FLOW INSUFFLATOR,  DO NOT USE IF PACKAGE IS DAMAGED,  KEEP DRY,  KEEP AWAY FROM SUNLIGHT,  PROTECT FROM HEAT AND RADIOACTIVE SOURCES.]: Brand: PNEUMOSURE

## (undated) DEVICE — SPONGE 4 X 4 XRAY 16 PLY STRL LF RFD